# Patient Record
Sex: MALE | Race: ASIAN | NOT HISPANIC OR LATINO | Employment: FULL TIME | URBAN - METROPOLITAN AREA
[De-identification: names, ages, dates, MRNs, and addresses within clinical notes are randomized per-mention and may not be internally consistent; named-entity substitution may affect disease eponyms.]

---

## 2023-01-12 ENCOUNTER — TRANSFERRED RECORDS (OUTPATIENT)
Dept: HEALTH INFORMATION MANAGEMENT | Facility: CLINIC | Age: 58
End: 2023-01-12

## 2023-01-12 ENCOUNTER — APPOINTMENT (OUTPATIENT)
Dept: INTERPRETER SERVICES | Facility: CLINIC | Age: 58
End: 2023-01-12
Payer: COMMERCIAL

## 2023-01-12 LAB
ALBUMIN (URINE) MG/SPEC: 155.9 UG/ML
ALBUMIN/CREATININE RATIO: 82 MG/G CREAT (ref 0–29)
ALT SERPL-CCNC: 17 IU/L (ref 0–44)
AST SERPL-CCNC: 26 IU/L (ref 0–40)
CREATININE (EXTERNAL): 0.81 MG/DL (ref 0.76–1.27)
CREATININE (URINE): 189.6 MG/DL
GFR ESTIMATED (EXTERNAL): 102 ML/MIN/1.73
GLUCOSE (EXTERNAL): 99 MG/DL (ref 70–99)
HBA1C MFR BLD: 5.3 % (ref 4.8–5.6)
POTASSIUM (EXTERNAL): 4.5 MMOL/L (ref 3.5–5.2)
TSH SERPL-ACNC: 2.24 UIU/ML (ref 0.45–4.5)

## 2023-01-26 ENCOUNTER — TRANSFERRED RECORDS (OUTPATIENT)
Dept: HEALTH INFORMATION MANAGEMENT | Facility: CLINIC | Age: 58
End: 2023-01-26

## 2023-03-03 ENCOUNTER — OFFICE VISIT (OUTPATIENT)
Dept: CARDIOLOGY | Facility: CLINIC | Age: 58
End: 2023-03-03
Payer: COMMERCIAL

## 2023-03-03 VITALS
HEART RATE: 66 BPM | SYSTOLIC BLOOD PRESSURE: 118 MMHG | HEIGHT: 62 IN | WEIGHT: 204 LBS | DIASTOLIC BLOOD PRESSURE: 64 MMHG | RESPIRATION RATE: 20 BRPM | BODY MASS INDEX: 37.54 KG/M2

## 2023-03-03 DIAGNOSIS — E78.5 DYSLIPIDEMIA: Primary | ICD-10-CM

## 2023-03-03 DIAGNOSIS — I25.118 CORONARY ARTERY DISEASE INVOLVING NATIVE CORONARY ARTERY OF NATIVE HEART WITH OTHER FORM OF ANGINA PECTORIS (H): ICD-10-CM

## 2023-03-03 PROCEDURE — 93000 ELECTROCARDIOGRAM COMPLETE: CPT | Performed by: INTERNAL MEDICINE

## 2023-03-03 PROCEDURE — 99204 OFFICE O/P NEW MOD 45 MIN: CPT | Performed by: INTERNAL MEDICINE

## 2023-03-03 RX ORDER — FLUOXETINE 40 MG/1
1 CAPSULE ORAL DAILY
COMMUNITY
Start: 2023-01-12

## 2023-03-03 RX ORDER — METOPROLOL SUCCINATE 50 MG/1
1 TABLET, EXTENDED RELEASE ORAL DAILY
COMMUNITY
Start: 2023-01-12

## 2023-03-03 RX ORDER — METFORMIN HCL 500 MG
1 TABLET, EXTENDED RELEASE 24 HR ORAL DAILY
COMMUNITY
Start: 2023-01-12

## 2023-03-03 RX ORDER — TRAZODONE HYDROCHLORIDE 50 MG/1
1 TABLET, FILM COATED ORAL EVERY OTHER DAY
COMMUNITY
Start: 2023-01-12

## 2023-03-03 RX ORDER — ATORVASTATIN CALCIUM 80 MG/1
1 TABLET, FILM COATED ORAL DAILY
COMMUNITY
Start: 2023-01-12

## 2023-03-03 RX ORDER — ASPIRIN 81 MG/1
1 TABLET, COATED ORAL DAILY
COMMUNITY
Start: 2023-02-08

## 2023-03-03 RX ORDER — LISINOPRIL 20 MG/1
1 TABLET ORAL DAILY
COMMUNITY
Start: 2023-01-12

## 2023-03-03 RX ORDER — NITROGLYCERIN 0.4 MG/1
TABLET SUBLINGUAL
COMMUNITY
Start: 2022-04-05

## 2023-03-03 RX ORDER — AMLODIPINE BESYLATE 5 MG/1
1 TABLET ORAL DAILY
COMMUNITY
Start: 2023-01-12

## 2023-03-03 NOTE — PROGRESS NOTES
HEART CARE ENCOUNTER CONSULTATON NOTE      [facility]  [phone]      Assessment/Recommendations   Assessment:  1.  Coronary artery disease status post PCI 2017 with markedly abnormal EKG.  No chest pain.  Chronic dyspnea on exertion likely underlying COPD  2.  Dyslipidemia: Need fasting lipids and will check today  3.  Hypertension: Well-controlled  4.  Diabetes mellitus type 2  5.  Active smoking    Plan:  1.  Fasting lipid profile today  2.  Obtain records from [facility]  3.  Echocardiogram to evaluate for structural heart disease  4.  Continue aspirin, Lipitor, lisinopril, Norvasc and metoprolol  Follow-up in 1 year    Obtained records from [facility] after his visit.  He was admitted with an acute myocardial infarction culprit vessel LAD and on 4/1/2016 underwent PCI of LAD, ramus.  Staged PCI of RPDA was recommended and it does not appear he ever followed up with this.  LVEF 40-50% with wall motion abnormality in the anteroseptum, distal and anterior wall and apex.  Mild aortic regurgitation with mild dilation of sinuses of Valsalva at 4 cm.  Will recommend Lexiscan nuclear stress test to reassess ischemic disease.     History of Present Illness/Subjective    HPI: Elias Solorzano is a 57 year old male with history of coronary artery disease status post PCI of unknown vessel 2017 at [facility] (obtaining records not available at this time), hypertension, dyslipidemia, current smoker and diabetes mellitus who I am seeing today to establish care.  He is here today accompanied by his son.  He lives with his girlfriend and son.  He used to smoke a pack and a half a day and now smokes 5 cigarettes a day.  He has chronic shortness of breath with activity that improved after he had the stents placed.  He has not followed with a cardiologist since the hospitalization.  He walks his dog every day about 1-1/2 to 2 hours a day.  Denies any chest pain.    Twelve-lead EKG demonstrates sinus  "rhythm with first-degree AV block evidence of anteroseptal infarct age undetermined     Physical Examination  Review of Systems   Vitals: /64 (BP Location: Left arm, Patient Position: Sitting, Cuff Size: Adult Large)   Pulse 66   Resp 20   Ht 1.575 m (5' 2\")   Wt 92.5 kg (204 lb)   BMI 37.31 kg/m    BMI= Body mass index is 37.31 kg/m .  Wt Readings from Last 3 Encounters:   03/03/23 92.5 kg (204 lb)       General Appearance:   no distress, normal body habitus   ENT/Mouth: membranes moist, no oral lesions or bleeding gums.      EYES:  no scleral icterus, normal conjunctivae   Neck: no carotid bruits or thyromegaly   Chest/Lungs:   lungs are clear to auscultation   Cardiovascular:   Regular. Normal first and second heart sounds with no murmurs  no edema bilaterally    Abdomen:  no organomegaly, masses, bruits, or tenderness; bowel sounds are present   Extremities: no cyanosis or clubbing   Skin: no xanthelasma, warm.    Neurologic: normal  bilateral, no tremors     Psychiatric: alert and oriented x3, calm        Please refer above for cardiac ROS details.        Medical History  Surgical History Family History Social History   Coronary artery disease status post PCI 2017 at [facility]  Hypertension  Dyslipidemia  Beatties mellitus No past surgical history on file.    No Family history of heart disease   Social History     Socioeconomic History     Marital status:      Spouse name: Not on file     Number of children: Not on file     Years of education: Not on file     Highest education level: Not on file   Occupational History     Not on file   Tobacco Use     Smoking status: Every Day     Types: Cigarettes     Smokeless tobacco: Never   Substance and Sexual Activity     Alcohol use: Not on file     Drug use: Not on file     Sexual activity: Not on file   Other Topics Concern     Not on file   Social History Narrative     Not on file     Social Determinants of Health     Financial Resource Strain: " Not on file   Food Insecurity: Not on file   Transportation Needs: Not on file   Physical Activity: Not on file   Stress: Not on file   Social Connections: Not on file   Intimate Partner Violence: Not on file   Housing Stability: Not on file           Medications  Allergies   Current Outpatient Medications   Medication Sig Dispense Refill     amLODIPine (NORVASC) 5 MG tablet Take 1 tablet by mouth daily       ASPIRIN LOW DOSE 81 MG EC tablet Take 1 tablet by mouth daily       atorvastatin (LIPITOR) 80 MG tablet Take 1 tablet by mouth daily       FLUoxetine (PROZAC) 40 MG capsule Take 1 capsule by mouth daily       lisinopril (ZESTRIL) 20 MG tablet Take 1 tablet by mouth daily       metFORMIN (GLUCOPHAGE XR) 500 MG 24 hr tablet Take 1 tablet by mouth daily       metoprolol succinate ER (TOPROL XL) 50 MG 24 hr tablet Take 1 tablet by mouth daily       nitroGLYcerin (NITROSTAT) 0.4 MG sublingual tablet DISSOLVE 1 PILL UNDER TONGUE EVERY 5 MINUTES AS NEEDED FOR CHEST PAIN/YOG MOB HAUV SIAB MUAB IB LUB TSHUAJ LOAN HAUV QAB NPLAIG TXHUA 5 JODI THIS       traZODone (DESYREL) 50 MG tablet Take 1 tablet by mouth every other day       No Known Allergies       Lab Results    Chemistry/lipid CBC Cardiac Enzymes/BNP/TSH/INR   No results for input(s): CHOL, HDL, LDL, TRIG, CHOLHDLRATIO in the last 83593 hours.  No results for input(s): LDL in the last 66604 hours.  No results for input(s): NA, POTASSIUM, CHLORIDE, CO2, GLC, BUN, CR, GFRESTIMATED, JOEY in the last 10662 hours.    Invalid input(s): GRFESTBLACK  No results for input(s): CR in the last 56483 hours.  No results for input(s): A1C in the last 75807 hours.       No results for input(s): WBC, HGB, HCT, MCV, PLT in the last 23410 hours.  No results for input(s): HGB in the last 22839 hours. No results for input(s): TROPONINI in the last 77454 hours.  No results for input(s): BNP, NTBNPI, NTBNP in the last 68958 hours.  No results for input(s): TSH in the last 56867 hours.  No  results for input(s): INR in the last 24601 hours.     Roxi Huizar MD

## 2023-03-03 NOTE — LETTER
3/3/2023    RONIT WALDEN MD  [facility] [address]  [facility] [address]    RE: Elias Solorzano       Dear Colleague,     I had the pleasure of seeing Elias Solorzano in the [facility].    HEART CARE ENCOUNTER CONSULTATON NOTE      [facility]  [phone]      Assessment/Recommendations   Assessment:  1.  Coronary artery disease status post PCI 2017 with markedly abnormal EKG.  No chest pain.  Chronic dyspnea on exertion likely underlying COPD  2.  Dyslipidemia: Need fasting lipids and will check today  3.  Hypertension: Well-controlled  4.  Diabetes mellitus type 2  5.  Active smoking    Plan:  1.  Fasting lipid profile today  2.  Obtain records from [facility]  3.  Echocardiogram to evaluate for structural heart disease  4.  Continue aspirin, Lipitor, lisinopril, Norvasc and metoprolol  Follow-up in 1 year    Obtained records from [facility] after his visit.  He was admitted with an acute myocardial infarction culprit vessel LAD and on 4/1/2016 underwent PCI of LAD, ramus.  Staged PCI of RPDA was recommended and it does not appear he ever followed up with this.  LVEF 40-50% with wall motion abnormality in the anteroseptum, distal and anterior wall and apex.  Mild aortic regurgitation with mild dilation of sinuses of Valsalva at 4 cm.  Will recommend Lexiscan nuclear stress test to reassess ischemic disease.     History of Present Illness/Subjective    HPI: Elias Solorzano is a 57 year old male with history of coronary artery disease status post PCI of unknown vessel 2017 at [facility] (obtaining records not available at this time), hypertension, dyslipidemia, current smoker and diabetes mellitus who I am seeing today to establish care.  He is here today accompanied by his son.  He lives with his girlfriend and son.  He used to smoke a pack and a half a day and now smokes 5 cigarettes a day.  He has chronic shortness of breath with activity that improved after he had the  "stents placed.  He has not followed with a cardiologist since the hospitalization.  He walks his dog every day about 1-1/2 to 2 hours a day.  Denies any chest pain.    Twelve-lead EKG demonstrates sinus rhythm with first-degree AV block evidence of anteroseptal infarct age undetermined     Physical Examination  Review of Systems   Vitals: /64 (BP Location: Left arm, Patient Position: Sitting, Cuff Size: Adult Large)   Pulse 66   Resp 20   Ht 1.575 m (5' 2\")   Wt 92.5 kg (204 lb)   BMI 37.31 kg/m    BMI= Body mass index is 37.31 kg/m .  Wt Readings from Last 3 Encounters:   03/03/23 92.5 kg (204 lb)       General Appearance:   no distress, normal body habitus   ENT/Mouth: membranes moist, no oral lesions or bleeding gums.      EYES:  no scleral icterus, normal conjunctivae   Neck: no carotid bruits or thyromegaly   Chest/Lungs:   lungs are clear to auscultation   Cardiovascular:   Regular. Normal first and second heart sounds with no murmurs  no edema bilaterally    Abdomen:  no organomegaly, masses, bruits, or tenderness; bowel sounds are present   Extremities: no cyanosis or clubbing   Skin: no xanthelasma, warm.    Neurologic: normal  bilateral, no tremors     Psychiatric: alert and oriented x3, calm        Please refer above for cardiac ROS details.        Medical History  Surgical History Family History Social History   Coronary artery disease status post PCI 2017 at [facility]  Hypertension  Dyslipidemia  Beatties mellitus No past surgical history on file.    No Family history of heart disease   Social History     Socioeconomic History     Marital status:      Spouse name: Not on file     Number of children: Not on file     Years of education: Not on file     Highest education level: Not on file   Occupational History     Not on file   Tobacco Use     Smoking status: Every Day     Types: Cigarettes     Smokeless tobacco: Never   Substance and Sexual Activity     Alcohol use: Not on file     " Drug use: Not on file     Sexual activity: Not on file   Other Topics Concern     Not on file   Social History Narrative     Not on file     Social Determinants of Health     Financial Resource Strain: Not on file   Food Insecurity: Not on file   Transportation Needs: Not on file   Physical Activity: Not on file   Stress: Not on file   Social Connections: Not on file   Intimate Partner Violence: Not on file   Housing Stability: Not on file           Medications  Allergies   Current Outpatient Medications   Medication Sig Dispense Refill     amLODIPine (NORVASC) 5 MG tablet Take 1 tablet by mouth daily       ASPIRIN LOW DOSE 81 MG EC tablet Take 1 tablet by mouth daily       atorvastatin (LIPITOR) 80 MG tablet Take 1 tablet by mouth daily       FLUoxetine (PROZAC) 40 MG capsule Take 1 capsule by mouth daily       lisinopril (ZESTRIL) 20 MG tablet Take 1 tablet by mouth daily       metFORMIN (GLUCOPHAGE XR) 500 MG 24 hr tablet Take 1 tablet by mouth daily       metoprolol succinate ER (TOPROL XL) 50 MG 24 hr tablet Take 1 tablet by mouth daily       nitroGLYcerin (NITROSTAT) 0.4 MG sublingual tablet DISSOLVE 1 PILL UNDER TONGUE EVERY 5 MINUTES AS NEEDED FOR CHEST PAIN/YOG MOB HAUV SIAB MUAB IB LUB TSHUAJ LOAN HAUV QAB NPLAIG TXHUA 5 JODI THIS       traZODone (DESYREL) 50 MG tablet Take 1 tablet by mouth every other day       No Known Allergies       Lab Results    Chemistry/lipid CBC Cardiac Enzymes/BNP/TSH/INR   No results for input(s): CHOL, HDL, LDL, TRIG, CHOLHDLRATIO in the last 63512 hours.  No results for input(s): LDL in the last 63076 hours.  No results for input(s): NA, POTASSIUM, CHLORIDE, CO2, GLC, BUN, CR, GFRESTIMATED, JOEY in the last 32849 hours.    Invalid input(s): GRFESTBLACK  No results for input(s): CR in the last 98714 hours.  No results for input(s): A1C in the last 93591 hours.       No results for input(s): WBC, HGB, HCT, MCV, PLT in the last 01171 hours.  No results for input(s): HGB in the last  32915 hours. No results for input(s): TROPONINI in the last 28339 hours.  No results for input(s): BNP, NTBNPI, NTBNP in the last 60551 hours.  No results for input(s): TSH in the last 40303 hours.  No results for input(s): INR in the last 32155 hours.     Roxi Huizar MD      Thank you for allowing me to participate in the care of your patient.      Sincerely,     Roxi Huizar MD     [facility]  cc:   No referring provider defined for this encounter.

## 2023-03-04 LAB
ATRIAL RATE - MUSE: 60 BPM
DIASTOLIC BLOOD PRESSURE - MUSE: NORMAL MMHG
INTERPRETATION ECG - MUSE: NORMAL
P AXIS - MUSE: 25 DEGREES
PR INTERVAL - MUSE: 220 MS
QRS DURATION - MUSE: 92 MS
QT - MUSE: 436 MS
QTC - MUSE: 436 MS
R AXIS - MUSE: 11 DEGREES
SYSTOLIC BLOOD PRESSURE - MUSE: NORMAL MMHG
T AXIS - MUSE: 175 DEGREES
VENTRICULAR RATE- MUSE: 60 BPM

## 2023-03-06 ENCOUNTER — TELEPHONE (OUTPATIENT)
Dept: CARDIOLOGY | Facility: CLINIC | Age: 58
End: 2023-03-06

## 2023-03-06 NOTE — TELEPHONE ENCOUNTER
----- Message from Roxi Huizar MD sent at 3/3/2023  5:08 PM CST -----  [facility].  He has residual disease and he never returned for staged PCI.  Given this recommend Lexiscan nuclear stress test for further evaluation.  I placed the order.

## 2023-03-06 NOTE — TELEPHONE ENCOUNTER
Msg sent to sched for follow-up - noted echo sched on 3-31-23 - FLP pending completion - follow-up pending in 1yr.  mg

## 2023-03-30 ENCOUNTER — APPOINTMENT (OUTPATIENT)
Dept: INTERPRETER SERVICES | Facility: CLINIC | Age: 58
End: 2023-03-30
Payer: COMMERCIAL

## 2023-03-31 ENCOUNTER — HOSPITAL ENCOUNTER (OUTPATIENT)
Dept: CARDIOLOGY | Facility: HOSPITAL | Age: 58
Discharge: HOME | End: 2023-03-31
Attending: INTERNAL MEDICINE | Admitting: INTERNAL MEDICINE
Payer: COMMERCIAL

## 2023-03-31 DIAGNOSIS — I25.118 CORONARY ARTERY DISEASE INVOLVING NATIVE CORONARY ARTERY OF NATIVE HEART WITH OTHER FORM OF ANGINA PECTORIS (H): ICD-10-CM

## 2023-03-31 PROCEDURE — 255N000002 HC RX 255 OP 636: Performed by: INTERNAL MEDICINE

## 2023-03-31 PROCEDURE — 93306 TTE W/DOPPLER COMPLETE: CPT | Mod: 26 | Performed by: INTERNAL MEDICINE

## 2023-03-31 RX ADMIN — PERFLUTREN 2 ML: 6.52 INJECTION, SUSPENSION INTRAVENOUS at 09:23

## 2023-03-31 NOTE — TELEPHONE ENCOUNTER
Noted Lexiscan nuc still pending scheduling - msg sent to sched with request to make 2nd attempt to arrange testing.  mg

## 2023-04-05 ENCOUNTER — APPOINTMENT (OUTPATIENT)
Dept: INTERPRETER SERVICES | Facility: CLINIC | Age: 58
End: 2023-04-05
Payer: COMMERCIAL

## 2023-04-07 ENCOUNTER — HOSPITAL ENCOUNTER (OUTPATIENT)
Dept: NUCLEAR MEDICINE | Facility: HOSPITAL | Age: 58
Discharge: HOME | End: 2023-04-07
Attending: INTERNAL MEDICINE
Payer: COMMERCIAL

## 2023-04-07 ENCOUNTER — HOSPITAL ENCOUNTER (OUTPATIENT)
Dept: CARDIOLOGY | Facility: HOSPITAL | Age: 58
Discharge: HOME | End: 2023-04-07
Attending: INTERNAL MEDICINE
Payer: COMMERCIAL

## 2023-04-07 DIAGNOSIS — I25.118 CORONARY ARTERY DISEASE INVOLVING NATIVE CORONARY ARTERY OF NATIVE HEART WITH OTHER FORM OF ANGINA PECTORIS (H): ICD-10-CM

## 2023-04-07 LAB
CV STRESS CURRENT BP HE: NORMAL
CV STRESS CURRENT HR HE: 101
CV STRESS CURRENT HR HE: 101
CV STRESS CURRENT HR HE: 103
CV STRESS CURRENT HR HE: 103
CV STRESS CURRENT HR HE: 107
CV STRESS CURRENT HR HE: 108
CV STRESS CURRENT HR HE: 113
CV STRESS CURRENT HR HE: 113
CV STRESS CURRENT HR HE: 75
CV STRESS CURRENT HR HE: 79
CV STRESS CURRENT HR HE: 81
CV STRESS CURRENT HR HE: 96
CV STRESS CURRENT HR HE: 96
CV STRESS CURRENT HR HE: 97
CV STRESS CURRENT HR HE: 98
CV STRESS CURRENT HR HE: 99
CV STRESS DEVIATION TIME HE: NORMAL
CV STRESS ECHO PERCENT HR HE: NORMAL
CV STRESS EXERCISE STAGE HE: NORMAL
CV STRESS FINAL RESTING BP HE: NORMAL
CV STRESS FINAL RESTING HR HE: 96
CV STRESS MAX HR HE: 115
CV STRESS MAX TREADMILL GRADE HE: 0
CV STRESS MAX TREADMILL SPEED HE: 0
CV STRESS PEAK DIA BP HE: NORMAL
CV STRESS PEAK SYS BP HE: NORMAL
CV STRESS PHASE HE: NORMAL
CV STRESS PROTOCOL HE: NORMAL
CV STRESS RESTING PT POSITION HE: NORMAL
CV STRESS ST DEVIATION AMOUNT HE: NORMAL
CV STRESS ST DEVIATION ELEVATION HE: NORMAL
CV STRESS ST EVELATION AMOUNT HE: NORMAL
CV STRESS TEST TYPE HE: NORMAL
CV STRESS TOTAL STAGE TIME MIN 1 HE: NORMAL
NUC STRESS EJECTION FRACTION: 33 %
RATE PRESSURE PRODUCT: NORMAL
STRESS ECHO BASELINE DIASTOLIC HE: 89
STRESS ECHO BASELINE HR: 76
STRESS ECHO BASELINE SYSTOLIC BP: 150
STRESS ECHO CALCULATED PERCENT HR: 71 %
STRESS ECHO LAST STRESS DIASTOLIC BP: 86
STRESS ECHO LAST STRESS HR: 103
STRESS ECHO LAST STRESS SYSTOLIC BP: 152
STRESS ECHO TARGET HR: 162

## 2023-04-07 PROCEDURE — 343N000001 HC RX 343: Performed by: INTERNAL MEDICINE

## 2023-04-07 PROCEDURE — 93018 CV STRESS TEST I&R ONLY: CPT | Performed by: INTERNAL MEDICINE

## 2023-04-07 PROCEDURE — 250N000011 HC RX IP 250 OP 636: Performed by: INTERNAL MEDICINE

## 2023-04-07 PROCEDURE — A9500 TC99M SESTAMIBI: HCPCS | Performed by: INTERNAL MEDICINE

## 2023-04-07 PROCEDURE — 78452 HT MUSCLE IMAGE SPECT MULT: CPT

## 2023-04-07 PROCEDURE — 93017 CV STRESS TEST TRACING ONLY: CPT

## 2023-04-07 PROCEDURE — 78452 HT MUSCLE IMAGE SPECT MULT: CPT | Mod: 26 | Performed by: INTERNAL MEDICINE

## 2023-04-07 PROCEDURE — 93016 CV STRESS TEST SUPVJ ONLY: CPT | Performed by: INTERNAL MEDICINE

## 2023-04-07 RX ORDER — REGADENOSON 0.08 MG/ML
0.4 INJECTION, SOLUTION INTRAVENOUS ONCE
Status: COMPLETED | OUTPATIENT
Start: 2023-04-07 | End: 2023-04-07

## 2023-04-07 RX ORDER — AMINOPHYLLINE 25 MG/ML
50 INJECTION, SOLUTION INTRAVENOUS
Status: DISCONTINUED | OUTPATIENT
Start: 2023-04-07 | End: 2023-04-07 | Stop reason: HOSPADM

## 2023-04-07 RX ADMIN — Medication 28.8 MILLICURIE: at 10:20

## 2023-04-07 RX ADMIN — REGADENOSON 0.4 MG: 0.08 INJECTION, SOLUTION INTRAVENOUS at 10:18

## 2023-04-07 RX ADMIN — Medication 8.2 MILLICURIE: at 09:07

## 2024-01-01 ENCOUNTER — OFFICE VISIT (OUTPATIENT)
Dept: PULMONOLOGY | Facility: CLINIC | Age: 59
End: 2024-01-01
Payer: COMMERCIAL

## 2024-01-01 ENCOUNTER — APPOINTMENT (OUTPATIENT)
Dept: GENERAL RADIOLOGY | Facility: CLINIC | Age: 59
End: 2024-01-01
Attending: NURSE PRACTITIONER
Payer: COMMERCIAL

## 2024-01-01 ENCOUNTER — TRANSFERRED RECORDS (OUTPATIENT)
Dept: HEALTH INFORMATION MANAGEMENT | Facility: CLINIC | Age: 59
End: 2024-01-01
Payer: COMMERCIAL

## 2024-01-01 ENCOUNTER — APPOINTMENT (OUTPATIENT)
Dept: INTERPRETER SERVICES | Facility: CLINIC | Age: 59
End: 2024-01-01
Payer: COMMERCIAL

## 2024-01-01 ENCOUNTER — APPOINTMENT (OUTPATIENT)
Dept: CT IMAGING | Facility: CLINIC | Age: 59
End: 2024-01-01
Attending: INTERNAL MEDICINE
Payer: COMMERCIAL

## 2024-01-01 ENCOUNTER — APPOINTMENT (OUTPATIENT)
Dept: NEUROLOGY | Facility: CLINIC | Age: 59
End: 2024-01-01
Attending: NURSE PRACTITIONER
Payer: COMMERCIAL

## 2024-01-01 ENCOUNTER — APPOINTMENT (OUTPATIENT)
Dept: ULTRASOUND IMAGING | Facility: CLINIC | Age: 59
End: 2024-01-01
Attending: NURSE PRACTITIONER
Payer: COMMERCIAL

## 2024-01-01 ENCOUNTER — TELEPHONE (OUTPATIENT)
Dept: PULMONOLOGY | Facility: CLINIC | Age: 59
End: 2024-01-01
Payer: COMMERCIAL

## 2024-01-01 ENCOUNTER — MEDICAL CORRESPONDENCE (OUTPATIENT)
Dept: HEALTH INFORMATION MANAGEMENT | Facility: CLINIC | Age: 59
End: 2024-01-01
Payer: COMMERCIAL

## 2024-01-01 ENCOUNTER — APPOINTMENT (OUTPATIENT)
Dept: NUCLEAR MEDICINE | Facility: CLINIC | Age: 59
End: 2024-01-01
Attending: NURSE PRACTITIONER
Payer: COMMERCIAL

## 2024-01-01 ENCOUNTER — HOSPITAL ENCOUNTER (INPATIENT)
Facility: CLINIC | Age: 59
LOS: 2 days | End: 2024-08-01
Admitting: INTERNAL MEDICINE
Payer: COMMERCIAL

## 2024-01-01 ENCOUNTER — APPOINTMENT (OUTPATIENT)
Dept: CT IMAGING | Facility: CLINIC | Age: 59
End: 2024-01-01
Attending: NURSE PRACTITIONER
Payer: COMMERCIAL

## 2024-01-01 ENCOUNTER — APPOINTMENT (OUTPATIENT)
Dept: CARDIOLOGY | Facility: CLINIC | Age: 59
End: 2024-01-01
Attending: NURSE PRACTITIONER
Payer: COMMERCIAL

## 2024-01-01 VITALS
HEART RATE: 58 BPM | WEIGHT: 203 LBS | HEIGHT: 62 IN | OXYGEN SATURATION: 99 % | SYSTOLIC BLOOD PRESSURE: 112 MMHG | DIASTOLIC BLOOD PRESSURE: 68 MMHG | BODY MASS INDEX: 37.36 KG/M2

## 2024-01-01 DIAGNOSIS — R91.8 LEFT LOWER LOBE PULMONARY INFILTRATE: Primary | ICD-10-CM

## 2024-01-01 DIAGNOSIS — I46.9 CARDIAC ARREST (H): ICD-10-CM

## 2024-01-01 LAB
ABO/RH(D): NORMAL
ACT BLD: 165 SECONDS (ref 74–150)
ACT BLD: 173 SECONDS (ref 74–150)
ACT BLD: 174 SECONDS (ref 74–150)
ACT BLD: 174 SECONDS (ref 74–150)
ACT BLD: 178 SECONDS (ref 74–150)
ACT BLD: 178 SECONDS (ref 74–150)
ACT BLD: 182 SECONDS (ref 74–150)
ACT BLD: 182 SECONDS (ref 74–150)
ACT BLD: 186 SECONDS (ref 74–150)
ACT BLD: 190 SECONDS (ref 74–150)
ACT BLD: 195 SECONDS (ref 74–150)
ACT BLD: 195 SECONDS (ref 74–150)
ACT BLD: 199 SECONDS (ref 74–150)
ACT BLD: 199 SECONDS (ref 74–150)
ACT BLD: 203 SECONDS (ref 74–150)
ACT BLD: 207 SECONDS (ref 74–150)
ACT BLD: 220 SECONDS (ref 74–150)
ACT BLD: 220 SECONDS (ref 74–150)
ACT BLD: 236 SECONDS (ref 74–150)
ACT BLD: 241 SECONDS (ref 74–150)
ALBUMIN SERPL BCG-MCNC: 2.7 G/DL (ref 3.5–5.2)
ALBUMIN SERPL BCG-MCNC: 2.8 G/DL (ref 3.5–5.2)
ALBUMIN SERPL BCG-MCNC: 2.9 G/DL (ref 3.5–5.2)
ALBUMIN SERPL BCG-MCNC: 3 G/DL (ref 3.5–5.2)
ALBUMIN SERPL BCG-MCNC: 3.1 G/DL (ref 3.5–5.2)
ALBUMIN SERPL BCG-MCNC: 3.4 G/DL (ref 3.5–5.2)
ALBUMIN UR-MCNC: 200 MG/DL
ALLEN'S TEST: ABNORMAL
ALP SERPL-CCNC: 36 U/L (ref 40–150)
ALP SERPL-CCNC: 37 U/L (ref 40–150)
ALP SERPL-CCNC: 40 U/L (ref 40–150)
ALP SERPL-CCNC: 41 U/L (ref 40–150)
ALP SERPL-CCNC: 46 U/L (ref 40–150)
ALP SERPL-CCNC: 46 U/L (ref 40–150)
ALP SERPL-CCNC: 50 U/L (ref 40–150)
ALP SERPL-CCNC: 55 U/L (ref 40–150)
ALP SERPL-CCNC: 60 U/L (ref 40–150)
ALP SERPL-CCNC: 73 U/L (ref 40–150)
ALP SERPL-CCNC: 94 U/L (ref 40–150)
ALT SERPL W P-5'-P-CCNC: 153 U/L (ref 0–70)
ALT SERPL W P-5'-P-CCNC: 157 U/L (ref 0–70)
ALT SERPL W P-5'-P-CCNC: 164 U/L (ref 0–70)
ALT SERPL W P-5'-P-CCNC: 168 U/L (ref 0–70)
ALT SERPL W P-5'-P-CCNC: 179 U/L (ref 0–70)
ALT SERPL W P-5'-P-CCNC: 189 U/L (ref 0–70)
ALT SERPL W P-5'-P-CCNC: 206 U/L (ref 0–70)
ALT SERPL W P-5'-P-CCNC: 265 U/L (ref 0–70)
ALT SERPL W P-5'-P-CCNC: 281 U/L (ref 0–70)
ALT SERPL W P-5'-P-CCNC: 319 U/L (ref 0–70)
ALT SERPL W P-5'-P-CCNC: 341 U/L (ref 0–70)
AMMONIA PLAS-SCNC: 30 UMOL/L (ref 16–60)
AMPHETAMINES UR QL SCN: ABNORMAL
ANION GAP SERPL CALCULATED.3IONS-SCNC: 11 MMOL/L (ref 7–15)
ANION GAP SERPL CALCULATED.3IONS-SCNC: 12 MMOL/L (ref 7–15)
ANION GAP SERPL CALCULATED.3IONS-SCNC: 13 MMOL/L (ref 7–15)
ANION GAP SERPL CALCULATED.3IONS-SCNC: 14 MMOL/L (ref 7–15)
ANION GAP SERPL CALCULATED.3IONS-SCNC: 15 MMOL/L (ref 7–15)
ANION GAP SERPL CALCULATED.3IONS-SCNC: 18 MMOL/L (ref 7–15)
ANION GAP SERPL CALCULATED.3IONS-SCNC: 24 MMOL/L (ref 7–15)
ANTIBODY SCREEN: NEGATIVE
APPEARANCE UR: ABNORMAL
APTT PPP: 101 SECONDS (ref 22–38)
APTT PPP: 61 SECONDS (ref 22–38)
APTT PPP: 63 SECONDS (ref 22–38)
APTT PPP: 65 SECONDS (ref 22–38)
APTT PPP: 65 SECONDS (ref 22–38)
APTT PPP: 69 SECONDS (ref 22–38)
APTT PPP: 81 SECONDS (ref 22–38)
APTT PPP: 89 SECONDS (ref 22–38)
APTT PPP: 92 SECONDS (ref 22–38)
APTT PPP: 95 SECONDS (ref 22–38)
AST SERPL W P-5'-P-CCNC: 1100 U/L (ref 0–45)
AST SERPL W P-5'-P-CCNC: 1527 U/L (ref 0–45)
AST SERPL W P-5'-P-CCNC: 1648 U/L (ref 0–45)
AST SERPL W P-5'-P-CCNC: 2148 U/L (ref 0–45)
AST SERPL W P-5'-P-CCNC: 2464 U/L (ref 0–45)
AST SERPL W P-5'-P-CCNC: 620 U/L (ref 0–45)
AST SERPL W P-5'-P-CCNC: 649 U/L (ref 0–45)
AST SERPL W P-5'-P-CCNC: 712 U/L (ref 0–45)
AST SERPL W P-5'-P-CCNC: 737 U/L (ref 0–45)
AST SERPL W P-5'-P-CCNC: 795 U/L (ref 0–45)
AST SERPL W P-5'-P-CCNC: 903 U/L (ref 0–45)
AT III ACT/NOR PPP CHRO: 40 % (ref 85–135)
AT III ACT/NOR PPP CHRO: 53 % (ref 85–135)
ATRIAL RATE - MUSE: 103 BPM
ATRIAL RATE - MUSE: 60 BPM
ATRIAL RATE - MUSE: 62 BPM
BACTERIA SPT CULT: NORMAL
BARBITURATES UR QL SCN: ABNORMAL
BASE EXCESS BLDA CALC-SCNC: -1.3 MMOL/L (ref -3–3)
BASE EXCESS BLDA CALC-SCNC: -1.4 MMOL/L (ref -3–3)
BASE EXCESS BLDA CALC-SCNC: -1.4 MMOL/L (ref -3–3)
BASE EXCESS BLDA CALC-SCNC: -1.9 MMOL/L (ref -3–3)
BASE EXCESS BLDA CALC-SCNC: -1.9 MMOL/L (ref -3–3)
BASE EXCESS BLDA CALC-SCNC: -2 MMOL/L (ref -3–3)
BASE EXCESS BLDA CALC-SCNC: -2.2 MMOL/L (ref -3–3)
BASE EXCESS BLDA CALC-SCNC: -2.2 MMOL/L (ref -3–3)
BASE EXCESS BLDA CALC-SCNC: -2.7 MMOL/L (ref -3–3)
BASE EXCESS BLDA CALC-SCNC: -2.7 MMOL/L (ref -3–3)
BASE EXCESS BLDA CALC-SCNC: -3 MMOL/L (ref -3–3)
BASE EXCESS BLDA CALC-SCNC: -3 MMOL/L (ref -3–3)
BASE EXCESS BLDA CALC-SCNC: -3.1 MMOL/L (ref -3–3)
BASE EXCESS BLDA CALC-SCNC: -3.2 MMOL/L (ref -3–3)
BASE EXCESS BLDA CALC-SCNC: -3.3 MMOL/L (ref -3–3)
BASE EXCESS BLDA CALC-SCNC: -3.3 MMOL/L (ref -3–3)
BASE EXCESS BLDA CALC-SCNC: -3.5 MMOL/L (ref -3–3)
BASE EXCESS BLDA CALC-SCNC: -3.6 MMOL/L (ref -3–3)
BASE EXCESS BLDA CALC-SCNC: -3.6 MMOL/L (ref -3–3)
BASE EXCESS BLDA CALC-SCNC: -3.7 MMOL/L (ref -3–3)
BASE EXCESS BLDA CALC-SCNC: -3.7 MMOL/L (ref -3–3)
BASE EXCESS BLDA CALC-SCNC: -3.8 MMOL/L (ref -3–3)
BASE EXCESS BLDA CALC-SCNC: -4 MMOL/L (ref -3–3)
BASE EXCESS BLDA CALC-SCNC: -4.5 MMOL/L (ref -3–3)
BASE EXCESS BLDA CALC-SCNC: -4.6 MMOL/L (ref -3–3)
BASE EXCESS BLDA CALC-SCNC: -6 MMOL/L (ref -3–3)
BASE EXCESS BLDA CALC-SCNC: -6.1 MMOL/L (ref -3–3)
BASE EXCESS BLDA CALC-SCNC: -6.1 MMOL/L (ref -3–3)
BASE EXCESS BLDA CALC-SCNC: -6.4 MMOL/L (ref -3–3)
BASE EXCESS BLDA CALC-SCNC: -6.4 MMOL/L (ref -3–3)
BASE EXCESS BLDA CALC-SCNC: -6.6 MMOL/L (ref -3–3)
BASE EXCESS BLDA CALC-SCNC: -7.2 MMOL/L (ref -3–3)
BASE EXCESS BLDA CALC-SCNC: -7.2 MMOL/L (ref -3–3)
BASE EXCESS BLDA CALC-SCNC: -7.5 MMOL/L (ref -3–3)
BASE EXCESS BLDA CALC-SCNC: -7.8 MMOL/L (ref -3–3)
BASE EXCESS BLDA CALC-SCNC: -8.5 MMOL/L (ref -3–3)
BASE EXCESS BLDA CALC-SCNC: -8.6 MMOL/L (ref -3–3)
BASE EXCESS BLDV CALC-SCNC: -1.4 MMOL/L (ref -3–3)
BASE EXCESS BLDV CALC-SCNC: -2.1 MMOL/L (ref -3–3)
BASE EXCESS BLDV CALC-SCNC: -2.8 MMOL/L (ref -3–3)
BASE EXCESS BLDV CALC-SCNC: -3.6 MMOL/L (ref -3–3)
BASE EXCESS BLDV CALC-SCNC: -4.8 MMOL/L (ref -3–3)
BASE EXCESS BLDV CALC-SCNC: -5.7 MMOL/L (ref -3–3)
BASE EXCESS BLDV CALC-SCNC: -6.8 MMOL/L (ref -3–3)
BASOPHILS # BLD AUTO: 0 10E3/UL (ref 0–0.2)
BASOPHILS NFR BLD AUTO: 0 %
BENZODIAZ UR QL SCN: ABNORMAL
BILIRUB SERPL-MCNC: 0.6 MG/DL
BILIRUB SERPL-MCNC: 0.7 MG/DL
BILIRUB SERPL-MCNC: 0.8 MG/DL
BILIRUB SERPL-MCNC: 1.3 MG/DL
BILIRUB SERPL-MCNC: 1.4 MG/DL
BILIRUB SERPL-MCNC: 1.4 MG/DL
BILIRUB SERPL-MCNC: 1.5 MG/DL
BILIRUB SERPL-MCNC: 1.5 MG/DL
BILIRUB SERPL-MCNC: 2 MG/DL
BILIRUB SERPL-MCNC: 2.1 MG/DL
BILIRUB SERPL-MCNC: 2.2 MG/DL
BILIRUB UR QL STRIP: NEGATIVE
BLD PROD TYP BPU: NORMAL
BLD PROD TYP BPU: NORMAL
BLOOD COMPONENT TYPE: NORMAL
BLOOD COMPONENT TYPE: NORMAL
BUN SERPL-MCNC: 15.9 MG/DL (ref 8–23)
BUN SERPL-MCNC: 20 MG/DL (ref 8–23)
BUN SERPL-MCNC: 23.7 MG/DL (ref 8–23)
BUN SERPL-MCNC: 26.5 MG/DL (ref 8–23)
BUN SERPL-MCNC: 28.5 MG/DL (ref 8–23)
BUN SERPL-MCNC: 30.9 MG/DL (ref 8–23)
BUN SERPL-MCNC: 33.5 MG/DL (ref 8–23)
BUN SERPL-MCNC: 36.8 MG/DL (ref 8–23)
BUN SERPL-MCNC: 39.7 MG/DL (ref 8–23)
BUN SERPL-MCNC: 42.3 MG/DL (ref 8–23)
BUN SERPL-MCNC: 45 MG/DL (ref 8–23)
BZE UR QL SCN: ABNORMAL
CA-I BLD-MCNC: 4.3 MG/DL (ref 4.4–5.2)
CA-I BLD-MCNC: 4.4 MG/DL (ref 4.4–5.2)
CA-I BLD-MCNC: 4.4 MG/DL (ref 4.4–5.2)
CA-I BLD-MCNC: 4.6 MG/DL (ref 4.4–5.2)
CA-I BLD-MCNC: 4.7 MG/DL (ref 4.4–5.2)
CA-I BLD-MCNC: 4.7 MG/DL (ref 4.4–5.2)
CA-I BLD-MCNC: 4.8 MG/DL (ref 4.4–5.2)
CA-I BLD-MCNC: 4.8 MG/DL (ref 4.4–5.2)
CA-I BLD-MCNC: 4.9 MG/DL (ref 4.4–5.2)
CA-I BLD-MCNC: 5 MG/DL (ref 4.4–5.2)
CA-I BLD-MCNC: 5 MG/DL (ref 4.4–5.2)
CALCIUM SERPL-MCNC: 7.9 MG/DL (ref 8.8–10.4)
CALCIUM SERPL-MCNC: 8.2 MG/DL (ref 8.8–10.4)
CALCIUM SERPL-MCNC: 8.3 MG/DL (ref 8.8–10.4)
CALCIUM SERPL-MCNC: 8.4 MG/DL (ref 8.8–10.4)
CALCIUM SERPL-MCNC: 8.4 MG/DL (ref 8.8–10.4)
CALCIUM SERPL-MCNC: 8.5 MG/DL (ref 8.8–10.4)
CALCIUM SERPL-MCNC: 8.7 MG/DL (ref 8.8–10.4)
CALCIUM SERPL-MCNC: 8.9 MG/DL (ref 8.8–10.4)
CALCIUM SERPL-MCNC: 9.2 MG/DL (ref 8.8–10.4)
CANNABINOIDS UR QL SCN: ABNORMAL
CF REDUC 30M P MA P HEP LENFR BLD TEG: 0.2 % (ref 0–8)
CF REDUC 30M P MA P HEP LENFR BLD TEG: 1 % (ref 0–8)
CF REDUC 30M P MA P HEP LENFR BLD TEG: 2.1 % (ref 0–8)
CF REDUC 60M P MA P HEPASE LENFR BLD TEG: 2.7 % (ref 0–15)
CF REDUC 60M P MA P HEPASE LENFR BLD TEG: 4.5 % (ref 0–15)
CF REDUC 60M P MA P HEPASE LENFR BLD TEG: 4.5 % (ref 0–15)
CFT P HPASE BLD TEG: 1.3 MINUTE (ref 1–3)
CFT P HPASE BLD TEG: 2.3 MINUTE (ref 1–3)
CFT P HPASE BLD TEG: 3.3 MINUTE (ref 1–3)
CHLORIDE SERPL-SCNC: 106 MMOL/L (ref 98–107)
CHLORIDE SERPL-SCNC: 113 MMOL/L (ref 98–107)
CHLORIDE SERPL-SCNC: 114 MMOL/L (ref 98–107)
CHLORIDE SERPL-SCNC: 115 MMOL/L (ref 98–107)
CHLORIDE SERPL-SCNC: 116 MMOL/L (ref 98–107)
CI (COAGULATION INDEX)(Z): -2.2 (ref -3–3)
CI (COAGULATION INDEX)(Z): -4.8 (ref -3–3)
CI (COAGULATION INDEX)(Z): 0.1 (ref -3–3)
CK SERPL-CCNC: 4401 U/L (ref 39–308)
CK SERPL-CCNC: 5242 U/L (ref 39–308)
CK SERPL-CCNC: 5323 U/L (ref 39–308)
CK SERPL-CCNC: 5324 U/L (ref 39–308)
CK SERPL-CCNC: 5333 U/L (ref 39–308)
CK SERPL-CCNC: 5452 U/L (ref 39–308)
CK SERPL-CCNC: ABNORMAL U/L (ref 39–308)
CLOT ANGLE P HPASE BLD TEG: 49 DEGREES (ref 53–72)
CLOT ANGLE P HPASE BLD TEG: 60 DEGREES (ref 53–72)
CLOT ANGLE P HPASE BLD TEG: 68.8 DEGREES (ref 53–72)
CLOT INIT P HPASE BLD TEG: 6.4 MINUTE (ref 5–10)
CLOT INIT P HPASE BLD TEG: 7.2 MINUTE (ref 5–10)
CLOT INIT P HPASE BLD TEG: 8.7 MINUTE (ref 5–10)
CLOT STRENGTH P HPASE BLD TEG: 5 KD/SC (ref 4.5–11)
CLOT STRENGTH P HPASE BLD TEG: 5.2 KD/SC (ref 4.5–11)
CLOT STRENGTH P HPASE BLD TEG: 8.9 KD/SC (ref 4.5–11)
CODING SYSTEM: NORMAL
CODING SYSTEM: NORMAL
COHGB MFR BLD: 100 % (ref 96–97)
COHGB MFR BLD: 87.8 % (ref 96–97)
COHGB MFR BLD: 89.5 % (ref 96–97)
COHGB MFR BLD: 90.6 % (ref 96–97)
COHGB MFR BLD: 91.6 % (ref 96–97)
COHGB MFR BLD: 92.4 % (ref 96–97)
COHGB MFR BLD: 93.1 % (ref 96–97)
COHGB MFR BLD: 94.2 % (ref 96–97)
COHGB MFR BLD: 94.7 % (ref 96–97)
COHGB MFR BLD: 94.8 % (ref 96–97)
COHGB MFR BLD: 96.4 % (ref 96–97)
COHGB MFR BLD: 97 % (ref 96–97)
COHGB MFR BLD: 97.3 % (ref 96–97)
COHGB MFR BLD: 97.3 % (ref 96–97)
COHGB MFR BLD: 98 % (ref 96–97)
COHGB MFR BLD: 98.4 % (ref 96–97)
COHGB MFR BLD: 98.6 % (ref 96–97)
COHGB MFR BLD: 99.2 % (ref 96–97)
COHGB MFR BLD: 99.4 % (ref 96–97)
COHGB MFR BLD: 99.4 % (ref 96–97)
COHGB MFR BLD: 99.7 % (ref 96–97)
COHGB MFR BLD: 99.7 % (ref 96–97)
COHGB MFR BLD: 99.9 % (ref 96–97)
COHGB MFR BLD: 99.9 % (ref 96–97)
COHGB MFR BLD: >100 % (ref 96–97)
COLOR UR AUTO: ABNORMAL
CORTIS SERPL-MCNC: 5.1 UG/DL
CREAT SERPL-MCNC: 1.53 MG/DL (ref 0.67–1.17)
CREAT SERPL-MCNC: 2.11 MG/DL (ref 0.67–1.17)
CREAT SERPL-MCNC: 2.64 MG/DL (ref 0.67–1.17)
CREAT SERPL-MCNC: 3.01 MG/DL (ref 0.67–1.17)
CREAT SERPL-MCNC: 3.14 MG/DL (ref 0.67–1.17)
CREAT SERPL-MCNC: 3.3 MG/DL (ref 0.67–1.17)
CREAT SERPL-MCNC: 3.48 MG/DL (ref 0.67–1.17)
CREAT SERPL-MCNC: 3.67 MG/DL (ref 0.67–1.17)
CREAT SERPL-MCNC: 3.72 MG/DL (ref 0.67–1.17)
CREAT SERPL-MCNC: 3.8 MG/DL (ref 0.67–1.17)
CREAT SERPL-MCNC: 3.93 MG/DL (ref 0.67–1.17)
CRP SERPL-MCNC: 274 MG/L
CRP SERPL-MCNC: 79.4 MG/L
CRP SERPL-MCNC: <3 MG/L
D DIMER PPP FEU-MCNC: 12.08 UG/ML FEU (ref 0–0.5)
D DIMER PPP FEU-MCNC: 3.05 UG/ML FEU (ref 0–0.5)
D DIMER PPP FEU-MCNC: 3.31 UG/ML FEU (ref 0–0.5)
D DIMER PPP FEU-MCNC: 3.42 UG/ML FEU (ref 0–0.5)
D DIMER PPP FEU-MCNC: 5.93 UG/ML FEU (ref 0–0.5)
D DIMER PPP FEU-MCNC: >20 UG/ML FEU (ref 0–0.5)
DIASTOLIC BLOOD PRESSURE - MUSE: NORMAL MMHG
EGFRCR SERPLBLD CKD-EPI 2021: 17 ML/MIN/1.73M2
EGFRCR SERPLBLD CKD-EPI 2021: 17 ML/MIN/1.73M2
EGFRCR SERPLBLD CKD-EPI 2021: 18 ML/MIN/1.73M2
EGFRCR SERPLBLD CKD-EPI 2021: 18 ML/MIN/1.73M2
EGFRCR SERPLBLD CKD-EPI 2021: 19 ML/MIN/1.73M2
EGFRCR SERPLBLD CKD-EPI 2021: 21 ML/MIN/1.73M2
EGFRCR SERPLBLD CKD-EPI 2021: 22 ML/MIN/1.73M2
EGFRCR SERPLBLD CKD-EPI 2021: 23 ML/MIN/1.73M2
EGFRCR SERPLBLD CKD-EPI 2021: 27 ML/MIN/1.73M2
EGFRCR SERPLBLD CKD-EPI 2021: 35 ML/MIN/1.73M2
EGFRCR SERPLBLD CKD-EPI 2021: 52 ML/MIN/1.73M2
EOSINOPHIL # BLD AUTO: 0 10E3/UL (ref 0–0.7)
EOSINOPHIL NFR BLD AUTO: 0 %
ERYTHROCYTE [DISTWIDTH] IN BLOOD BY AUTOMATED COUNT: 13.5 % (ref 10–15)
ERYTHROCYTE [DISTWIDTH] IN BLOOD BY AUTOMATED COUNT: 13.6 % (ref 10–15)
ERYTHROCYTE [DISTWIDTH] IN BLOOD BY AUTOMATED COUNT: 13.7 % (ref 10–15)
ERYTHROCYTE [DISTWIDTH] IN BLOOD BY AUTOMATED COUNT: 13.8 % (ref 10–15)
ERYTHROCYTE [DISTWIDTH] IN BLOOD BY AUTOMATED COUNT: 13.9 % (ref 10–15)
ERYTHROCYTE [DISTWIDTH] IN BLOOD BY AUTOMATED COUNT: 14 % (ref 10–15)
ERYTHROCYTE [DISTWIDTH] IN BLOOD BY AUTOMATED COUNT: 14.2 % (ref 10–15)
ERYTHROCYTE [DISTWIDTH] IN BLOOD BY AUTOMATED COUNT: 14.2 % (ref 10–15)
ERYTHROCYTE [DISTWIDTH] IN BLOOD BY AUTOMATED COUNT: 14.4 % (ref 10–15)
ERYTHROCYTE [DISTWIDTH] IN BLOOD BY AUTOMATED COUNT: 14.4 % (ref 10–15)
ERYTHROCYTE [DISTWIDTH] IN BLOOD BY AUTOMATED COUNT: 14.6 % (ref 10–15)
ERYTHROCYTE [SEDIMENTATION RATE] IN BLOOD BY WESTERGREN METHOD: 10 MM/HR (ref 0–20)
ERYTHROCYTE [SEDIMENTATION RATE] IN BLOOD BY WESTERGREN METHOD: 33 MM/HR (ref 0–20)
ERYTHROCYTE [SEDIMENTATION RATE] IN BLOOD BY WESTERGREN METHOD: 6 MM/HR (ref 0–20)
FENTANYL UR QL: ABNORMAL
FIBRINOGEN PPP-MCNC: 240 MG/DL (ref 170–510)
FIBRINOGEN PPP-MCNC: 328 MG/DL (ref 170–510)
FIBRINOGEN PPP-MCNC: 362 MG/DL (ref 170–510)
FIBRINOGEN PPP-MCNC: 468 MG/DL (ref 170–510)
FIBRINOGEN PPP-MCNC: 551 MG/DL (ref 170–510)
GLUCOSE BLD-MCNC: 109 MG/DL (ref 70–99)
GLUCOSE BLD-MCNC: 113 MG/DL (ref 70–99)
GLUCOSE BLD-MCNC: 113 MG/DL (ref 70–99)
GLUCOSE BLD-MCNC: 114 MG/DL (ref 70–99)
GLUCOSE BLD-MCNC: 119 MG/DL (ref 70–99)
GLUCOSE BLD-MCNC: 120 MG/DL (ref 70–99)
GLUCOSE BLD-MCNC: 123 MG/DL (ref 70–99)
GLUCOSE BLD-MCNC: 129 MG/DL (ref 70–99)
GLUCOSE BLD-MCNC: 137 MG/DL (ref 70–99)
GLUCOSE BLD-MCNC: 171 MG/DL (ref 70–99)
GLUCOSE BLD-MCNC: 193 MG/DL (ref 70–99)
GLUCOSE BLD-MCNC: 200 MG/DL (ref 70–99)
GLUCOSE BLDC GLUCOMTR-MCNC: 100 MG/DL (ref 70–99)
GLUCOSE BLDC GLUCOMTR-MCNC: 107 MG/DL (ref 70–99)
GLUCOSE BLDC GLUCOMTR-MCNC: 109 MG/DL (ref 70–99)
GLUCOSE BLDC GLUCOMTR-MCNC: 110 MG/DL (ref 70–99)
GLUCOSE BLDC GLUCOMTR-MCNC: 112 MG/DL (ref 70–99)
GLUCOSE BLDC GLUCOMTR-MCNC: 114 MG/DL (ref 70–99)
GLUCOSE BLDC GLUCOMTR-MCNC: 114 MG/DL (ref 70–99)
GLUCOSE BLDC GLUCOMTR-MCNC: 117 MG/DL (ref 70–99)
GLUCOSE BLDC GLUCOMTR-MCNC: 117 MG/DL (ref 70–99)
GLUCOSE BLDC GLUCOMTR-MCNC: 119 MG/DL (ref 70–99)
GLUCOSE BLDC GLUCOMTR-MCNC: 120 MG/DL (ref 70–99)
GLUCOSE BLDC GLUCOMTR-MCNC: 123 MG/DL (ref 70–99)
GLUCOSE BLDC GLUCOMTR-MCNC: 123 MG/DL (ref 70–99)
GLUCOSE BLDC GLUCOMTR-MCNC: 125 MG/DL (ref 70–99)
GLUCOSE BLDC GLUCOMTR-MCNC: 128 MG/DL (ref 70–99)
GLUCOSE BLDC GLUCOMTR-MCNC: 129 MG/DL (ref 70–99)
GLUCOSE BLDC GLUCOMTR-MCNC: 131 MG/DL (ref 70–99)
GLUCOSE BLDC GLUCOMTR-MCNC: 131 MG/DL (ref 70–99)
GLUCOSE BLDC GLUCOMTR-MCNC: 133 MG/DL (ref 70–99)
GLUCOSE BLDC GLUCOMTR-MCNC: 134 MG/DL (ref 70–99)
GLUCOSE BLDC GLUCOMTR-MCNC: 156 MG/DL (ref 70–99)
GLUCOSE BLDC GLUCOMTR-MCNC: 161 MG/DL (ref 70–99)
GLUCOSE BLDC GLUCOMTR-MCNC: 161 MG/DL (ref 70–99)
GLUCOSE BLDC GLUCOMTR-MCNC: 168 MG/DL (ref 70–99)
GLUCOSE BLDC GLUCOMTR-MCNC: 175 MG/DL (ref 70–99)
GLUCOSE BLDC GLUCOMTR-MCNC: 178 MG/DL (ref 70–99)
GLUCOSE BLDC GLUCOMTR-MCNC: 200 MG/DL (ref 70–99)
GLUCOSE BLDC GLUCOMTR-MCNC: 220 MG/DL (ref 70–99)
GLUCOSE BLDC GLUCOMTR-MCNC: 95 MG/DL (ref 70–99)
GLUCOSE SERPL-MCNC: 114 MG/DL (ref 70–99)
GLUCOSE SERPL-MCNC: 116 MG/DL (ref 70–99)
GLUCOSE SERPL-MCNC: 118 MG/DL (ref 70–99)
GLUCOSE SERPL-MCNC: 119 MG/DL (ref 70–99)
GLUCOSE SERPL-MCNC: 122 MG/DL (ref 70–99)
GLUCOSE SERPL-MCNC: 125 MG/DL (ref 70–99)
GLUCOSE SERPL-MCNC: 130 MG/DL (ref 70–99)
GLUCOSE SERPL-MCNC: 135 MG/DL (ref 70–99)
GLUCOSE SERPL-MCNC: 142 MG/DL (ref 70–99)
GLUCOSE SERPL-MCNC: 182 MG/DL (ref 70–99)
GLUCOSE SERPL-MCNC: 228 MG/DL (ref 70–99)
GLUCOSE UR STRIP-MCNC: 70 MG/DL
GRAM STAIN RESULT: NORMAL
GRAM STAIN RESULT: NORMAL
HBA1C MFR BLD: 5.5 %
HCO3 BLD-SCNC: 18 MMOL/L (ref 21–28)
HCO3 BLD-SCNC: 19 MMOL/L (ref 21–28)
HCO3 BLD-SCNC: 20 MMOL/L (ref 21–28)
HCO3 BLD-SCNC: 21 MMOL/L (ref 21–28)
HCO3 BLD-SCNC: 22 MMOL/L (ref 21–28)
HCO3 BLD-SCNC: 23 MMOL/L (ref 21–28)
HCO3 BLD-SCNC: 24 MMOL/L (ref 21–28)
HCO3 BLD-SCNC: 24 MMOL/L (ref 21–28)
HCO3 BLD-SCNC: 25 MMOL/L (ref 21–28)
HCO3 BLDA-SCNC: 18 MMOL/L (ref 21–28)
HCO3 BLDA-SCNC: 20 MMOL/L (ref 21–28)
HCO3 BLDA-SCNC: 22 MMOL/L (ref 21–28)
HCO3 BLDV-SCNC: 19 MMOL/L (ref 21–28)
HCO3 BLDV-SCNC: 21 MMOL/L (ref 21–28)
HCO3 BLDV-SCNC: 22 MMOL/L (ref 21–28)
HCO3 BLDV-SCNC: 23 MMOL/L (ref 21–28)
HCO3 BLDV-SCNC: 24 MMOL/L (ref 21–28)
HCO3 SERPL-SCNC: 18 MMOL/L (ref 22–29)
HCO3 SERPL-SCNC: 18 MMOL/L (ref 22–29)
HCO3 SERPL-SCNC: 19 MMOL/L (ref 22–29)
HCO3 SERPL-SCNC: 21 MMOL/L (ref 22–29)
HCO3 SERPL-SCNC: 22 MMOL/L (ref 22–29)
HCT VFR BLD AUTO: 31.9 % (ref 40–53)
HCT VFR BLD AUTO: 32.8 % (ref 40–53)
HCT VFR BLD AUTO: 32.8 % (ref 40–53)
HCT VFR BLD AUTO: 34.1 % (ref 40–53)
HCT VFR BLD AUTO: 34.4 % (ref 40–53)
HCT VFR BLD AUTO: 39.5 % (ref 40–53)
HCT VFR BLD AUTO: 41.9 % (ref 40–53)
HCT VFR BLD AUTO: 42 % (ref 40–53)
HCT VFR BLD AUTO: 44.9 % (ref 40–53)
HGB BLD-MCNC: 11 G/DL (ref 13.3–17.7)
HGB BLD-MCNC: 11.2 G/DL (ref 13.3–17.7)
HGB BLD-MCNC: 11.4 G/DL (ref 13.3–17.7)
HGB BLD-MCNC: 11.6 G/DL (ref 13.3–17.7)
HGB BLD-MCNC: 12.2 G/DL (ref 13.3–17.7)
HGB BLD-MCNC: 12.3 G/DL (ref 13.3–17.7)
HGB BLD-MCNC: 12.7 G/DL (ref 13.3–17.7)
HGB BLD-MCNC: 14.2 G/DL (ref 13.3–17.7)
HGB BLD-MCNC: 14.6 G/DL (ref 13.3–17.7)
HGB BLD-MCNC: 14.7 G/DL (ref 13.3–17.7)
HGB BLD-MCNC: 15.2 G/DL (ref 13.3–17.7)
HGB BLD-MCNC: 15.3 G/DL (ref 13.3–17.7)
HGB BLD-MCNC: 16 G/DL (ref 13.3–17.7)
HGB FREE PLAS-MCNC: 30 MG/DL
HGB FREE PLAS-MCNC: 30 MG/DL
HGB FREE PLAS-MCNC: 80 MG/DL
HGB UR QL STRIP: ABNORMAL
IMM GRANULOCYTES # BLD: 0.2 10E3/UL
IMM GRANULOCYTES NFR BLD: 2 %
INR PPP: 1.51 (ref 0.85–1.15)
INR PPP: 1.55 (ref 0.85–1.15)
INR PPP: 1.6 (ref 0.85–1.15)
INR PPP: 1.64 (ref 0.85–1.15)
INR PPP: 1.67 (ref 0.85–1.15)
INR PPP: 1.81 (ref 0.85–1.15)
INR PPP: 1.9 (ref 0.85–1.15)
INR PPP: 1.9 (ref 0.85–1.15)
INR PPP: 1.92 (ref 0.85–1.15)
INR PPP: 2.01 (ref 0.85–1.15)
INTERPRETATION ECG - MUSE: NORMAL
INTERPRETATION TEGPIA: NORMAL
INTERPRETATION TEGPIA: NORMAL
ISSUE DATE AND TIME: NORMAL
ISSUE DATE AND TIME: NORMAL
KETONES UR STRIP-MCNC: NEGATIVE MG/DL
LACTATE BLD-SCNC: 12.4 MMOL/L (ref 0.7–2)
LACTATE BLD-SCNC: 12.4 MMOL/L (ref 0.7–2)
LACTATE SERPL-SCNC: 11.2 MMOL/L (ref 0.7–2)
LACTATE SERPL-SCNC: 12.4 MMOL/L (ref 0.7–2)
LACTATE SERPL-SCNC: 2.1 MMOL/L (ref 0.7–2)
LACTATE SERPL-SCNC: 2.4 MMOL/L (ref 0.7–2)
LACTATE SERPL-SCNC: 2.4 MMOL/L (ref 0.7–2)
LACTATE SERPL-SCNC: 2.5 MMOL/L (ref 0.7–2)
LACTATE SERPL-SCNC: 2.6 MMOL/L (ref 0.7–2)
LACTATE SERPL-SCNC: 2.9 MMOL/L (ref 0.7–2)
LACTATE SERPL-SCNC: 3.3 MMOL/L (ref 0.7–2)
LACTATE SERPL-SCNC: 4.8 MMOL/L (ref 0.7–2)
LACTATE SERPL-SCNC: 6.1 MMOL/L (ref 0.7–2)
LACTATE SERPL-SCNC: 7.6 MMOL/L (ref 0.7–2)
LACTATE SERPL-SCNC: 7.8 MMOL/L (ref 0.7–2)
LACTATE SERPL-SCNC: 8 MMOL/L (ref 0.7–2)
LACTATE SERPL-SCNC: 8.3 MMOL/L (ref 0.7–2)
LDH SERPL L TO P-CCNC: 2343 U/L (ref 0–250)
LDH SERPL L TO P-CCNC: 3436 U/L (ref 0–250)
LEUKOCYTE ESTERASE UR QL STRIP: NEGATIVE
LVEF ECHO: NORMAL
LYMPHOCYTES # BLD AUTO: 1.3 10E3/UL (ref 0.8–5.3)
LYMPHOCYTES NFR BLD AUTO: 10 %
MAGNESIUM SERPL-MCNC: 1.9 MG/DL (ref 1.7–2.3)
MAGNESIUM SERPL-MCNC: 2 MG/DL (ref 1.7–2.3)
MAGNESIUM SERPL-MCNC: 2.3 MG/DL (ref 1.7–2.3)
MAGNESIUM SERPL-MCNC: 2.7 MG/DL (ref 1.7–2.3)
MCF P HPASE BLD TEG: 50 MM (ref 50–70)
MCF P HPASE BLD TEG: 50.8 MM (ref 50–70)
MCF P HPASE BLD TEG: 64.1 MM (ref 50–70)
MCH RBC QN AUTO: 31.1 PG (ref 26.5–33)
MCH RBC QN AUTO: 31.2 PG (ref 26.5–33)
MCH RBC QN AUTO: 31.3 PG (ref 26.5–33)
MCH RBC QN AUTO: 31.4 PG (ref 26.5–33)
MCH RBC QN AUTO: 31.5 PG (ref 26.5–33)
MCH RBC QN AUTO: 31.6 PG (ref 26.5–33)
MCH RBC QN AUTO: 31.7 PG (ref 26.5–33)
MCH RBC QN AUTO: 32 PG (ref 26.5–33)
MCH RBC QN AUTO: 32.1 PG (ref 26.5–33)
MCHC RBC AUTO-ENTMCNC: 34.5 G/DL (ref 31.5–36.5)
MCHC RBC AUTO-ENTMCNC: 35.1 G/DL (ref 31.5–36.5)
MCHC RBC AUTO-ENTMCNC: 35.1 G/DL (ref 31.5–36.5)
MCHC RBC AUTO-ENTMCNC: 35.4 G/DL (ref 31.5–36.5)
MCHC RBC AUTO-ENTMCNC: 35.6 G/DL (ref 31.5–36.5)
MCHC RBC AUTO-ENTMCNC: 35.7 G/DL (ref 31.5–36.5)
MCHC RBC AUTO-ENTMCNC: 35.9 G/DL (ref 31.5–36.5)
MCHC RBC AUTO-ENTMCNC: 36.1 G/DL (ref 31.5–36.5)
MCHC RBC AUTO-ENTMCNC: 36.2 G/DL (ref 31.5–36.5)
MCHC RBC AUTO-ENTMCNC: 36.9 G/DL (ref 31.5–36.5)
MCHC RBC AUTO-ENTMCNC: 37.2 G/DL (ref 31.5–36.5)
MCV RBC AUTO: 85 FL (ref 78–100)
MCV RBC AUTO: 86 FL (ref 78–100)
MCV RBC AUTO: 87 FL (ref 78–100)
MCV RBC AUTO: 88 FL (ref 78–100)
MCV RBC AUTO: 89 FL (ref 78–100)
MCV RBC AUTO: 89 FL (ref 78–100)
MCV RBC AUTO: 90 FL (ref 78–100)
MCV RBC AUTO: 90 FL (ref 78–100)
MONOCYTES # BLD AUTO: 0.2 10E3/UL (ref 0–1.3)
MONOCYTES NFR BLD AUTO: 2 %
MRSA DNA SPEC QL NAA+PROBE: NEGATIVE
MUCOUS THREADS #/AREA URNS LPF: PRESENT /LPF
NEUTROPHILS # BLD AUTO: 11 10E3/UL (ref 1.6–8.3)
NEUTROPHILS NFR BLD AUTO: 86 %
NITRATE UR QL: NEGATIVE
NRBC # BLD AUTO: 0 10E3/UL
NRBC BLD AUTO-RTO: 0 /100
NSE SERPL IA-MCNC: 101.1 NG/ML
O2/TOTAL GAS SETTING VFR VENT: 100 %
O2/TOTAL GAS SETTING VFR VENT: 40 %
O2/TOTAL GAS SETTING VFR VENT: 40 %
O2/TOTAL GAS SETTING VFR VENT: 50 %
O2/TOTAL GAS SETTING VFR VENT: 55 %
O2/TOTAL GAS SETTING VFR VENT: 60 %
O2/TOTAL GAS SETTING VFR VENT: 80 %
O2/TOTAL GAS SETTING VFR VENT: 90 %
O2/TOTAL GAS SETTING VFR VENT: 90 %
OPIATES UR QL SCN: ABNORMAL
OXYHGB MFR BLDA: 97 % (ref 75–100)
OXYHGB MFR BLDA: 97 % (ref 75–100)
OXYHGB MFR BLDA: 97 % (ref 92–100)
OXYHGB MFR BLDA: 97 % (ref 92–100)
OXYHGB MFR BLDA: 98 % (ref 75–100)
OXYHGB MFR BLDA: 98 % (ref 92–100)
OXYHGB MFR BLDA: 98 % (ref 92–100)
OXYHGB MFR BLDA: 99 % (ref 75–100)
OXYHGB MFR BLDV: 69 %
OXYHGB MFR BLDV: 74 %
OXYHGB MFR BLDV: 78 %
OXYHGB MFR BLDV: 78 %
OXYHGB MFR BLDV: 79 %
OXYHGB MFR BLDV: 80 %
OXYHGB MFR BLDV: 93 % (ref 70–75)
P AXIS - MUSE: 108 DEGREES
P AXIS - MUSE: 36 DEGREES
P AXIS - MUSE: 49 DEGREES
PA AA BLD-ACNC: 64 %
PA AA BLD-ACNC: 94 %
PA ADP BLD-ACNC: 73 %
PA ADP BLD-ACNC: 88 %
PCO2 BLD: 31 MM HG (ref 35–45)
PCO2 BLD: 34 MM HG (ref 35–45)
PCO2 BLD: 35 MM HG (ref 35–45)
PCO2 BLD: 36 MM HG (ref 35–45)
PCO2 BLD: 36 MM HG (ref 35–45)
PCO2 BLD: 37 MM HG (ref 35–45)
PCO2 BLD: 38 MM HG (ref 35–45)
PCO2 BLD: 39 MM HG (ref 35–45)
PCO2 BLD: 39 MM HG (ref 35–45)
PCO2 BLD: 40 MM HG (ref 35–45)
PCO2 BLD: 41 MM HG (ref 35–45)
PCO2 BLD: 41 MM HG (ref 35–45)
PCO2 BLD: 42 MM HG (ref 35–45)
PCO2 BLD: 43 MM HG (ref 35–45)
PCO2 BLD: 43 MM HG (ref 35–45)
PCO2 BLD: 44 MM HG (ref 35–45)
PCO2 BLD: 44 MM HG (ref 35–45)
PCO2 BLD: 46 MM HG (ref 35–45)
PCO2 BLD: 50 MM HG (ref 35–45)
PCO2 BLD: 50 MM HG (ref 35–45)
PCO2 BLDA: 34 MM HG (ref 35–45)
PCO2 BLDA: 36 MM HG (ref 35–45)
PCO2 BLDA: 37 MM HG (ref 35–45)
PCO2 BLDA: 39 MM HG (ref 35–45)
PCO2 BLDA: 40 MM HG (ref 35–45)
PCO2 BLDA: 41 MM HG (ref 35–45)
PCO2 BLDA: 42 MM HG (ref 35–45)
PCO2 BLDA: 43 MM HG (ref 35–45)
PCO2 BLDV: 39 MM HG (ref 40–50)
PCO2 BLDV: 40 MM HG (ref 40–50)
PCO2 BLDV: 42 MM HG (ref 40–50)
PCO2 BLDV: 44 MM HG (ref 40–50)
PCO2 BLDV: 46 MM HG (ref 40–50)
PCO2 BLDV: 47 MM HG (ref 40–50)
PCO2 BLDV: 49 MM HG (ref 40–50)
PCP QUAL URINE (ROCHE): ABNORMAL
PEEP: 12 CM H2O
PEEP: 12 CM H2O
PEEP: 5 CM H2O
PEEP: 7 CM H2O
PEEP: 8 CM H2O
PH BLD: 7.23 [PH] (ref 7.35–7.45)
PH BLD: 7.24 [PH] (ref 7.35–7.45)
PH BLD: 7.26 [PH] (ref 7.35–7.45)
PH BLD: 7.26 [PH] (ref 7.35–7.45)
PH BLD: 7.27 [PH] (ref 7.35–7.45)
PH BLD: 7.3 [PH] (ref 7.35–7.45)
PH BLD: 7.3 [PH] (ref 7.35–7.45)
PH BLD: 7.32 [PH] (ref 7.35–7.45)
PH BLD: 7.33 [PH] (ref 7.35–7.45)
PH BLD: 7.33 [PH] (ref 7.35–7.45)
PH BLD: 7.34 [PH] (ref 7.35–7.45)
PH BLD: 7.35 [PH] (ref 7.35–7.45)
PH BLD: 7.36 [PH] (ref 7.35–7.45)
PH BLD: 7.36 [PH] (ref 7.35–7.45)
PH BLD: 7.37 [PH] (ref 7.35–7.45)
PH BLD: 7.38 [PH] (ref 7.35–7.45)
PH BLD: 7.39 [PH] (ref 7.35–7.45)
PH BLD: 7.39 [PH] (ref 7.35–7.45)
PH BLD: 7.4 [PH] (ref 7.35–7.45)
PH BLD: 7.4 [PH] (ref 7.35–7.45)
PH BLD: 7.44 [PH] (ref 7.35–7.45)
PH BLDA: 7.26 [PH] (ref 7.35–7.45)
PH BLDA: 7.29 [PH] (ref 7.35–7.45)
PH BLDA: 7.3 [PH] (ref 7.35–7.45)
PH BLDA: 7.32 [PH] (ref 7.35–7.45)
PH BLDA: 7.34 [PH] (ref 7.35–7.45)
PH BLDA: 7.37 [PH] (ref 7.35–7.45)
PH BLDA: 7.38 [PH] (ref 7.35–7.45)
PH BLDA: 7.4 [PH] (ref 7.35–7.45)
PH BLDV: 7.27 [PH] (ref 7.32–7.43)
PH BLDV: 7.28 [PH] (ref 7.32–7.43)
PH BLDV: 7.3 [PH] (ref 7.32–7.43)
PH BLDV: 7.31 [PH] (ref 7.32–7.43)
PH BLDV: 7.34 [PH] (ref 7.32–7.43)
PH BLDV: 7.35 [PH] (ref 7.32–7.43)
PH BLDV: 7.37 [PH] (ref 7.32–7.43)
PH UR STRIP: 6 [PH] (ref 5–7)
PHOSPHATE SERPL-MCNC: 1 MG/DL (ref 2.5–4.5)
PHOSPHATE SERPL-MCNC: 4 MG/DL (ref 2.5–4.5)
PHOSPHATE SERPL-MCNC: 4.6 MG/DL (ref 2.5–4.5)
PHOSPHATE SERPL-MCNC: 4.9 MG/DL (ref 2.5–4.5)
PLATELET # BLD AUTO: 100 10E3/UL (ref 150–450)
PLATELET # BLD AUTO: 103 10E3/UL (ref 150–450)
PLATELET # BLD AUTO: 121 10E3/UL (ref 150–450)
PLATELET # BLD AUTO: 147 10E3/UL (ref 150–450)
PLATELET # BLD AUTO: 177 10E3/UL (ref 150–450)
PLATELET # BLD AUTO: 206 10E3/UL (ref 150–450)
PLATELET # BLD AUTO: 79 10E3/UL (ref 150–450)
PLATELET # BLD AUTO: 83 10E3/UL (ref 150–450)
PLATELET # BLD AUTO: 89 10E3/UL (ref 150–450)
PLATELET # BLD AUTO: 92 10E3/UL (ref 150–450)
PLATELET # BLD AUTO: 95 10E3/UL (ref 150–450)
PO2 BLD: 104 MM HG (ref 80–105)
PO2 BLD: 114 MM HG (ref 80–105)
PO2 BLD: 115 MM HG (ref 80–105)
PO2 BLD: 123 MM HG (ref 80–105)
PO2 BLD: 124 MM HG (ref 80–105)
PO2 BLD: 125 MM HG (ref 80–105)
PO2 BLD: 129 MM HG (ref 80–105)
PO2 BLD: 138 MM HG (ref 80–105)
PO2 BLD: 140 MM HG (ref 80–105)
PO2 BLD: 148 MM HG (ref 80–105)
PO2 BLD: 149 MM HG (ref 80–105)
PO2 BLD: 150 MM HG (ref 80–105)
PO2 BLD: 170 MM HG (ref 80–105)
PO2 BLD: 183 MM HG (ref 80–105)
PO2 BLD: 50 MM HG (ref 80–105)
PO2 BLD: 55 MM HG (ref 80–105)
PO2 BLD: 55 MM HG (ref 80–105)
PO2 BLD: 58 MM HG (ref 80–105)
PO2 BLD: 58 MM HG (ref 80–105)
PO2 BLD: 62 MM HG (ref 80–105)
PO2 BLD: 68 MM HG (ref 80–105)
PO2 BLD: 71 MM HG (ref 80–105)
PO2 BLD: 75 MM HG (ref 80–105)
PO2 BLD: 77 MM HG (ref 80–105)
PO2 BLD: 82 MM HG (ref 80–105)
PO2 BLD: 83 MM HG (ref 80–105)
PO2 BLD: 87 MM HG (ref 80–105)
PO2 BLD: 87 MM HG (ref 80–105)
PO2 BLD: 99 MM HG (ref 80–105)
PO2 BLDA: 112 MM HG (ref 80–105)
PO2 BLDA: 135 MM HG (ref 80–105)
PO2 BLDA: 147 MM HG (ref 80–105)
PO2 BLDA: 151 MM HG (ref 80–105)
PO2 BLDA: 152 MM HG (ref 80–105)
PO2 BLDA: 245 MM HG (ref 80–105)
PO2 BLDA: 261 MM HG (ref 80–105)
PO2 BLDA: 544 MM HG (ref 80–105)
PO2 BLDV: 36 MM HG (ref 25–47)
PO2 BLDV: 41 MM HG (ref 25–47)
PO2 BLDV: 42 MM HG (ref 25–47)
PO2 BLDV: 43 MM HG (ref 25–47)
PO2 BLDV: 43 MM HG (ref 25–47)
PO2 BLDV: 47 MM HG (ref 25–47)
PO2 BLDV: 75 MM HG (ref 25–47)
POTASSIUM BLD-SCNC: 2.6 MMOL/L (ref 3.4–5.3)
POTASSIUM BLD-SCNC: 3.4 MMOL/L (ref 3.4–5.3)
POTASSIUM BLD-SCNC: 4.2 MMOL/L (ref 3.4–5.3)
POTASSIUM SERPL-SCNC: 2.9 MMOL/L (ref 3.4–5.3)
POTASSIUM SERPL-SCNC: 3.3 MMOL/L (ref 3.4–5.3)
POTASSIUM SERPL-SCNC: 3.5 MMOL/L (ref 3.4–5.3)
POTASSIUM SERPL-SCNC: 3.6 MMOL/L (ref 3.4–5.3)
POTASSIUM SERPL-SCNC: 4.1 MMOL/L (ref 3.4–5.3)
POTASSIUM SERPL-SCNC: 4.3 MMOL/L (ref 3.4–5.3)
POTASSIUM SERPL-SCNC: 4.3 MMOL/L (ref 3.4–5.3)
POTASSIUM SERPL-SCNC: 4.4 MMOL/L (ref 3.4–5.3)
POTASSIUM SERPL-SCNC: 4.8 MMOL/L (ref 3.4–5.3)
POTASSIUM SERPL-SCNC: 4.9 MMOL/L (ref 3.4–5.3)
POTASSIUM SERPL-SCNC: 5 MMOL/L (ref 3.4–5.3)
PR INTERVAL - MUSE: 126 MS
PR INTERVAL - MUSE: 220 MS
PR INTERVAL - MUSE: 244 MS
PROCALCITONIN SERPL IA-MCNC: 0.49 NG/ML
PROCALCITONIN SERPL IA-MCNC: 97.9 NG/ML
PROT SERPL-MCNC: 4.6 G/DL (ref 6.4–8.3)
PROT SERPL-MCNC: 4.7 G/DL (ref 6.4–8.3)
PROT SERPL-MCNC: 4.7 G/DL (ref 6.4–8.3)
PROT SERPL-MCNC: 4.8 G/DL (ref 6.4–8.3)
PROT SERPL-MCNC: 4.9 G/DL (ref 6.4–8.3)
PROT SERPL-MCNC: 4.9 G/DL (ref 6.4–8.3)
PROT SERPL-MCNC: 5 G/DL (ref 6.4–8.3)
PROT SERPL-MCNC: 5 G/DL (ref 6.4–8.3)
PROT SERPL-MCNC: 5.8 G/DL (ref 6.4–8.3)
QRS DURATION - MUSE: 100 MS
QRS DURATION - MUSE: 122 MS
QRS DURATION - MUSE: 82 MS
QT - MUSE: 346 MS
QT - MUSE: 494 MS
QT - MUSE: 514 MS
QTC - MUSE: 453 MS
QTC - MUSE: 494 MS
QTC - MUSE: 521 MS
R AXIS - MUSE: 156 DEGREES
R AXIS - MUSE: 69 DEGREES
R AXIS - MUSE: 71 DEGREES
RADIOLOGIST FLAGS: ABNORMAL
RBC # BLD AUTO: 3.54 10E6/UL (ref 4.4–5.9)
RBC # BLD AUTO: 3.56 10E6/UL (ref 4.4–5.9)
RBC # BLD AUTO: 3.6 10E6/UL (ref 4.4–5.9)
RBC # BLD AUTO: 3.71 10E6/UL (ref 4.4–5.9)
RBC # BLD AUTO: 3.8 10E6/UL (ref 4.4–5.9)
RBC # BLD AUTO: 3.89 10E6/UL (ref 4.4–5.9)
RBC # BLD AUTO: 4.04 10E6/UL (ref 4.4–5.9)
RBC # BLD AUTO: 4.53 10E6/UL (ref 4.4–5.9)
RBC # BLD AUTO: 4.71 10E6/UL (ref 4.4–5.9)
RBC # BLD AUTO: 4.75 10E6/UL (ref 4.4–5.9)
RBC # BLD AUTO: 5.11 10E6/UL (ref 4.4–5.9)
RBC URINE: 14 /HPF
SA TARGET DNA: NEGATIVE
SAO2 % BLDA: 86 % (ref 92–100)
SAO2 % BLDA: 88 % (ref 92–100)
SAO2 % BLDA: 89 % (ref 92–100)
SAO2 % BLDA: 90 % (ref 92–100)
SAO2 % BLDA: 91 % (ref 92–100)
SAO2 % BLDA: 91 % (ref 92–100)
SAO2 % BLDA: 92 % (ref 92–100)
SAO2 % BLDA: 92 % (ref 92–100)
SAO2 % BLDA: 93 % (ref 92–100)
SAO2 % BLDA: 94 % (ref 92–100)
SAO2 % BLDA: 95 % (ref 92–100)
SAO2 % BLDA: 96 % (ref 92–100)
SAO2 % BLDA: 96 % (ref 92–100)
SAO2 % BLDA: 97 % (ref 92–100)
SAO2 % BLDA: 98 % (ref 92–100)
SAO2 % BLDA: 99 % (ref 92–100)
SAO2 % BLDA: 99.1 % (ref 96–97)
SAO2 % BLDA: 99.7 % (ref 96–97)
SAO2 % BLDA: 99.8 % (ref 96–97)
SAO2 % BLDA: 99.9 % (ref 96–97)
SAO2 % BLDA: >100 % (ref 96–97)
SAO2 % BLDA: >100 % (ref 96–97)
SAO2 % BLDV: 70.4 % (ref 70–75)
SAO2 % BLDV: 76.1 % (ref 70–75)
SAO2 % BLDV: 79.3 % (ref 70–75)
SAO2 % BLDV: 79.8 % (ref 70–75)
SAO2 % BLDV: 81.2 % (ref 70–75)
SAO2 % BLDV: 81.3 % (ref 70–75)
SAO2 % BLDV: 95.5 % (ref 70–75)
SODIUM BLD-SCNC: 147 MMOL/L (ref 135–145)
SODIUM BLD-SCNC: 149 MMOL/L (ref 135–145)
SODIUM SERPL-SCNC: 147 MMOL/L (ref 135–145)
SODIUM SERPL-SCNC: 148 MMOL/L (ref 135–145)
SODIUM SERPL-SCNC: 149 MMOL/L (ref 135–145)
SODIUM SERPL-SCNC: 150 MMOL/L (ref 135–145)
SP GR UR STRIP: 1.01 (ref 1–1.03)
SPECIMEN EXPIRATION DATE: NORMAL
SPERM #/AREA URNS HPF: PRESENT /HPF
SQUAMOUS EPITHELIAL: 1 /HPF
SYSTOLIC BLOOD PRESSURE - MUSE: NORMAL MMHG
T AXIS - MUSE: 103 DEGREES
T AXIS - MUSE: 160 DEGREES
T AXIS - MUSE: 227 DEGREES
T3 SERPL-MCNC: 125 NG/DL (ref 85–202)
T3 SERPL-MCNC: 42 NG/DL (ref 85–202)
T3 SERPL-MCNC: 73 NG/DL (ref 85–202)
T3FREE SERPL-MCNC: 1.1 PG/ML (ref 2–4.4)
T3FREE SERPL-MCNC: 1.9 PG/ML (ref 2–4.4)
T3FREE SERPL-MCNC: 3.3 PG/ML (ref 2–4.4)
T4 FREE SERPL-MCNC: 0.93 NG/DL (ref 0.9–1.7)
T4 FREE SERPL-MCNC: 1.09 NG/DL (ref 0.9–1.7)
T4 FREE SERPL-MCNC: 1.2 NG/DL (ref 0.9–1.7)
TRANSITIONAL EPI: <1 /HPF
TROPONIN T SERPL HS-MCNC: ABNORMAL NG/L
TSH SERPL DL<=0.005 MIU/L-ACNC: 1.58 UIU/ML (ref 0.3–4.2)
TSH SERPL DL<=0.005 MIU/L-ACNC: 16.9 UIU/ML (ref 0.3–4.2)
TSH SERPL DL<=0.005 MIU/L-ACNC: 6.82 UIU/ML (ref 0.3–4.2)
UFH PPP CHRO-ACNC: 0.1 IU/ML
UFH PPP CHRO-ACNC: 0.11 IU/ML
UFH PPP CHRO-ACNC: 0.11 IU/ML
UFH PPP CHRO-ACNC: 0.13 IU/ML
UFH PPP CHRO-ACNC: <0.1 IU/ML
UNIT ABO/RH: NORMAL
UNIT ABO/RH: NORMAL
UNIT NUMBER: NORMAL
UNIT NUMBER: NORMAL
UNIT STATUS: NORMAL
UNIT STATUS: NORMAL
UNIT TYPE ISBT: 6200
UNIT TYPE ISBT: 6200
UROBILINOGEN UR STRIP-MCNC: NORMAL MG/DL
VENTRICULAR RATE- MUSE: 103 BPM
VENTRICULAR RATE- MUSE: 60 BPM
VENTRICULAR RATE- MUSE: 62 BPM
WBC # BLD AUTO: 10.2 10E3/UL (ref 4–11)
WBC # BLD AUTO: 10.4 10E3/UL (ref 4–11)
WBC # BLD AUTO: 10.5 10E3/UL (ref 4–11)
WBC # BLD AUTO: 12.8 10E3/UL (ref 4–11)
WBC # BLD AUTO: 13.2 10E3/UL (ref 4–11)
WBC # BLD AUTO: 14.6 10E3/UL (ref 4–11)
WBC # BLD AUTO: 17.8 10E3/UL (ref 4–11)
WBC # BLD AUTO: 7.6 10E3/UL (ref 4–11)
WBC # BLD AUTO: 8.7 10E3/UL (ref 4–11)
WBC # BLD AUTO: 8.9 10E3/UL (ref 4–11)
WBC # BLD AUTO: 9.3 10E3/UL (ref 4–11)
WBC URINE: 10 /HPF

## 2024-01-01 PROCEDURE — 250N000011 HC RX IP 250 OP 636: Performed by: INTERNAL MEDICINE

## 2024-01-01 PROCEDURE — 84439 ASSAY OF FREE THYROXINE: CPT | Performed by: NURSE PRACTITIONER

## 2024-01-01 PROCEDURE — 84295 ASSAY OF SERUM SODIUM: CPT

## 2024-01-01 PROCEDURE — 82330 ASSAY OF CALCIUM: CPT

## 2024-01-01 PROCEDURE — 272N000237 HC CARDIOHELP CIRCUIT

## 2024-01-01 PROCEDURE — 250N000013 HC RX MED GY IP 250 OP 250 PS 637: Performed by: NURSE PRACTITIONER

## 2024-01-01 PROCEDURE — 33952 ECMO/ECLS INSJ PRPH CANNULA: CPT | Performed by: INTERNAL MEDICINE

## 2024-01-01 PROCEDURE — 85652 RBC SED RATE AUTOMATED: CPT | Performed by: NURSE PRACTITIONER

## 2024-01-01 PROCEDURE — 36620 INSERTION CATHETER ARTERY: CPT | Mod: XU | Performed by: INTERNAL MEDICINE

## 2024-01-01 PROCEDURE — 70450 CT HEAD/BRAIN W/O DYE: CPT

## 2024-01-01 PROCEDURE — 86316 IMMUNOASSAY TUMOR OTHER: CPT | Performed by: NURSE PRACTITIONER

## 2024-01-01 PROCEDURE — 82805 BLOOD GASES W/O2 SATURATION: CPT

## 2024-01-01 PROCEDURE — 83735 ASSAY OF MAGNESIUM: CPT

## 2024-01-01 PROCEDURE — 87641 MR-STAPH DNA AMP PROBE: CPT | Performed by: NURSE PRACTITIONER

## 2024-01-01 PROCEDURE — B2111ZZ FLUOROSCOPY OF MULTIPLE CORONARY ARTERIES USING LOW OSMOLAR CONTRAST: ICD-10-PCS | Performed by: INTERNAL MEDICINE

## 2024-01-01 PROCEDURE — 85379 FIBRIN DEGRADATION QUANT: CPT

## 2024-01-01 PROCEDURE — 5A02210 ASSISTANCE WITH CARDIAC OUTPUT USING BALLOON PUMP, CONTINUOUS: ICD-10-PCS | Performed by: INTERNAL MEDICINE

## 2024-01-01 PROCEDURE — 999N000185 HC STATISTIC TRANSPORT TIME EA 15 MIN

## 2024-01-01 PROCEDURE — 250N000011 HC RX IP 250 OP 636: Mod: JZ

## 2024-01-01 PROCEDURE — 81001 URINALYSIS AUTO W/SCOPE: CPT | Performed by: NURSE PRACTITIONER

## 2024-01-01 PROCEDURE — 84075 ASSAY ALKALINE PHOSPHATASE: CPT

## 2024-01-01 PROCEDURE — 99291 CRITICAL CARE FIRST HOUR: CPT | Mod: 25 | Performed by: INTERNAL MEDICINE

## 2024-01-01 PROCEDURE — 83615 LACTATE (LD) (LDH) ENZYME: CPT | Performed by: NURSE PRACTITIONER

## 2024-01-01 PROCEDURE — 33967 INSERT I-AORT PERCUT DEVICE: CPT | Mod: GC | Performed by: INTERNAL MEDICINE

## 2024-01-01 PROCEDURE — 343N000001 HC RX 343: Performed by: INTERNAL MEDICINE

## 2024-01-01 PROCEDURE — 258N000003 HC RX IP 258 OP 636: Performed by: STUDENT IN AN ORGANIZED HEALTH CARE EDUCATION/TRAINING PROGRAM

## 2024-01-01 PROCEDURE — 84480 ASSAY TRIIODOTHYRONINE (T3): CPT | Performed by: NURSE PRACTITIONER

## 2024-01-01 PROCEDURE — 95711 VEEG 2-12 HR UNMONITORED: CPT

## 2024-01-01 PROCEDURE — C1769 GUIDE WIRE: HCPCS | Performed by: INTERNAL MEDICINE

## 2024-01-01 PROCEDURE — 85300 ANTITHROMBIN III ACTIVITY: CPT | Performed by: NURSE PRACTITIONER

## 2024-01-01 PROCEDURE — 86140 C-REACTIVE PROTEIN: CPT | Performed by: NURSE PRACTITIONER

## 2024-01-01 PROCEDURE — 250N000009 HC RX 250

## 2024-01-01 PROCEDURE — 93005 ELECTROCARDIOGRAM TRACING: CPT

## 2024-01-01 PROCEDURE — 85384 FIBRINOGEN ACTIVITY: CPT

## 2024-01-01 PROCEDURE — 84484 ASSAY OF TROPONIN QUANT: CPT

## 2024-01-01 PROCEDURE — C1894 INTRO/SHEATH, NON-LASER: HCPCS | Performed by: INTERNAL MEDICINE

## 2024-01-01 PROCEDURE — 250N000009 HC RX 250: Performed by: INTERNAL MEDICINE

## 2024-01-01 PROCEDURE — 84155 ASSAY OF PROTEIN SERUM: CPT

## 2024-01-01 PROCEDURE — 33947 ECMO/ECLS INITIATION ARTERY: CPT | Mod: GC | Performed by: INTERNAL MEDICINE

## 2024-01-01 PROCEDURE — 84443 ASSAY THYROID STIM HORMONE: CPT | Performed by: NURSE PRACTITIONER

## 2024-01-01 PROCEDURE — 250N000013 HC RX MED GY IP 250 OP 250 PS 637: Performed by: INTERNAL MEDICINE

## 2024-01-01 PROCEDURE — 255N000002 HC RX 255 OP 636: Performed by: INTERNAL MEDICINE

## 2024-01-01 PROCEDURE — 258N000003 HC RX IP 258 OP 636: Performed by: NURSE PRACTITIONER

## 2024-01-01 PROCEDURE — 85520 HEPARIN ASSAY: CPT

## 2024-01-01 PROCEDURE — 85048 AUTOMATED LEUKOCYTE COUNT: CPT

## 2024-01-01 PROCEDURE — 84450 TRANSFERASE (AST) (SGOT): CPT

## 2024-01-01 PROCEDURE — 70450 CT HEAD/BRAIN W/O DYE: CPT | Mod: 26 | Performed by: RADIOLOGY

## 2024-01-01 PROCEDURE — 250N000009 HC RX 250: Performed by: NURSE PRACTITIONER

## 2024-01-01 PROCEDURE — 85004 AUTOMATED DIFF WBC COUNT: CPT | Performed by: NURSE PRACTITIONER

## 2024-01-01 PROCEDURE — 99153 MOD SED SAME PHYS/QHP EA: CPT

## 2024-01-01 PROCEDURE — 85396 CLOTTING ASSAY WHOLE BLOOD: CPT | Performed by: NURSE PRACTITIONER

## 2024-01-01 PROCEDURE — 3E043XZ INTRODUCTION OF VASOPRESSOR INTO CENTRAL VEIN, PERCUTANEOUS APPROACH: ICD-10-PCS | Performed by: NURSE PRACTITIONER

## 2024-01-01 PROCEDURE — 258N000003 HC RX IP 258 OP 636: Performed by: INTERNAL MEDICINE

## 2024-01-01 PROCEDURE — 83605 ASSAY OF LACTIC ACID: CPT

## 2024-01-01 PROCEDURE — 84145 PROCALCITONIN (PCT): CPT | Performed by: NURSE PRACTITIONER

## 2024-01-01 PROCEDURE — 82947 ASSAY GLUCOSE BLOOD QUANT: CPT

## 2024-01-01 PROCEDURE — 82330 ASSAY OF CALCIUM: CPT | Performed by: NURSE PRACTITIONER

## 2024-01-01 PROCEDURE — 84481 FREE ASSAY (FT-3): CPT | Performed by: NURSE PRACTITIONER

## 2024-01-01 PROCEDURE — 200N000002 HC R&B ICU UMMC

## 2024-01-01 PROCEDURE — 85347 COAGULATION TIME ACTIVATED: CPT

## 2024-01-01 PROCEDURE — 999N000077 HC STATISTIC INSERT IABP

## 2024-01-01 PROCEDURE — 84100 ASSAY OF PHOSPHORUS: CPT | Performed by: INTERNAL MEDICINE

## 2024-01-01 PROCEDURE — 85610 PROTHROMBIN TIME: CPT

## 2024-01-01 PROCEDURE — 84295 ASSAY OF SERUM SODIUM: CPT | Performed by: NURSE PRACTITIONER

## 2024-01-01 PROCEDURE — 99233 SBSQ HOSP IP/OBS HIGH 50: CPT | Mod: GC | Performed by: INTERNAL MEDICINE

## 2024-01-01 PROCEDURE — 33967 INSERT I-AORT PERCUT DEVICE: CPT | Performed by: INTERNAL MEDICINE

## 2024-01-01 PROCEDURE — 83051 HEMOGLOBIN PLASMA: CPT | Performed by: NURSE PRACTITIONER

## 2024-01-01 PROCEDURE — 258N000003 HC RX IP 258 OP 636

## 2024-01-01 PROCEDURE — 71045 X-RAY EXAM CHEST 1 VIEW: CPT

## 2024-01-01 PROCEDURE — 85379 FIBRIN DEGRADATION QUANT: CPT | Performed by: NURSE PRACTITIONER

## 2024-01-01 PROCEDURE — 93880 EXTRACRANIAL BILAT STUDY: CPT | Mod: 26 | Performed by: RADIOLOGY

## 2024-01-01 PROCEDURE — 99207 PR NO BILLABLE SERVICE THIS VISIT: CPT | Performed by: INTERNAL MEDICINE

## 2024-01-01 PROCEDURE — 33949 ECMO/ECLS DAILY MGMT ARTERY: CPT | Performed by: INTERNAL MEDICINE

## 2024-01-01 PROCEDURE — 36556 INSERT NON-TUNNEL CV CATH: CPT | Mod: XU | Performed by: INTERNAL MEDICINE

## 2024-01-01 PROCEDURE — 33947 ECMO/ECLS INITIATION ARTERY: CPT

## 2024-01-01 PROCEDURE — 99152 MOD SED SAME PHYS/QHP 5/>YRS: CPT | Performed by: INTERNAL MEDICINE

## 2024-01-01 PROCEDURE — 84484 ASSAY OF TROPONIN QUANT: CPT | Performed by: NURSE PRACTITIONER

## 2024-01-01 PROCEDURE — 99231 SBSQ HOSP IP/OBS SF/LOW 25: CPT | Performed by: PSYCHIATRY & NEUROLOGY

## 2024-01-01 PROCEDURE — 85730 THROMBOPLASTIN TIME PARTIAL: CPT

## 2024-01-01 PROCEDURE — 87040 BLOOD CULTURE FOR BACTERIA: CPT | Performed by: INTERNAL MEDICINE

## 2024-01-01 PROCEDURE — C8929 TTE W OR WO FOL WCON,DOPPLER: HCPCS

## 2024-01-01 PROCEDURE — 95718 EEG PHYS/QHP 2-12 HR W/VEEG: CPT | Performed by: PSYCHIATRY & NEUROLOGY

## 2024-01-01 PROCEDURE — 272N000555 HC SENSOR NIRS OXIMETER, ADULT

## 2024-01-01 PROCEDURE — 36556 INSERT NON-TUNNEL CV CATH: CPT | Performed by: INTERNAL MEDICINE

## 2024-01-01 PROCEDURE — 5A1945Z RESPIRATORY VENTILATION, 24-96 CONSECUTIVE HOURS: ICD-10-PCS | Performed by: NURSE PRACTITIONER

## 2024-01-01 PROCEDURE — 250N000011 HC RX IP 250 OP 636: Performed by: NURSE PRACTITIONER

## 2024-01-01 PROCEDURE — 74018 RADEX ABDOMEN 1 VIEW: CPT | Mod: 26 | Performed by: RADIOLOGY

## 2024-01-01 PROCEDURE — 99204 OFFICE O/P NEW MOD 45 MIN: CPT | Performed by: INTERNAL MEDICINE

## 2024-01-01 PROCEDURE — 999N000157 HC STATISTIC RCP TIME EA 10 MIN

## 2024-01-01 PROCEDURE — 99222 1ST HOSP IP/OBS MODERATE 55: CPT | Mod: GC | Performed by: INTERNAL MEDICINE

## 2024-01-01 PROCEDURE — 94003 VENT MGMT INPAT SUBQ DAY: CPT

## 2024-01-01 PROCEDURE — 250N000011 HC RX IP 250 OP 636

## 2024-01-01 PROCEDURE — 82805 BLOOD GASES W/O2 SATURATION: CPT | Performed by: STUDENT IN AN ORGANIZED HEALTH CARE EDUCATION/TRAINING PROGRAM

## 2024-01-01 PROCEDURE — 33952 ECMO/ECLS INSJ PRPH CANNULA: CPT | Mod: GC | Performed by: INTERNAL MEDICINE

## 2024-01-01 PROCEDURE — P9045 ALBUMIN (HUMAN), 5%, 250 ML: HCPCS | Performed by: STUDENT IN AN ORGANIZED HEALTH CARE EDUCATION/TRAINING PROGRAM

## 2024-01-01 PROCEDURE — 33949 ECMO/ECLS DAILY MGMT ARTERY: CPT

## 2024-01-01 PROCEDURE — 71045 X-RAY EXAM CHEST 1 VIEW: CPT | Mod: 26 | Performed by: RADIOLOGY

## 2024-01-01 PROCEDURE — 82550 ASSAY OF CK (CPK): CPT | Performed by: NURSE PRACTITIONER

## 2024-01-01 PROCEDURE — 85027 COMPLETE CBC AUTOMATED: CPT

## 2024-01-01 PROCEDURE — A9521 TC99M EXAMETAZIME: HCPCS | Performed by: INTERNAL MEDICINE

## 2024-01-01 PROCEDURE — 82533 TOTAL CORTISOL: CPT | Performed by: NURSE PRACTITIONER

## 2024-01-01 PROCEDURE — 87640 STAPH A DNA AMP PROBE: CPT | Performed by: NURSE PRACTITIONER

## 2024-01-01 PROCEDURE — 93454 CORONARY ARTERY ANGIO S&I: CPT | Performed by: INTERNAL MEDICINE

## 2024-01-01 PROCEDURE — 5A1522G EXTRACORPOREAL OXYGENATION, MEMBRANE, PERIPHERAL VENO-ARTERIAL: ICD-10-PCS | Performed by: INTERNAL MEDICINE

## 2024-01-01 PROCEDURE — 82140 ASSAY OF AMMONIA: CPT | Performed by: NURSE PRACTITIONER

## 2024-01-01 PROCEDURE — 5A2204Z RESTORATION OF CARDIAC RHYTHM, SINGLE: ICD-10-PCS | Performed by: INTERNAL MEDICINE

## 2024-01-01 PROCEDURE — 85014 HEMATOCRIT: CPT

## 2024-01-01 PROCEDURE — 272N000766 HC IAB CATH AND INSERTION KIT, SENSATION

## 2024-01-01 PROCEDURE — 99292 CRITICAL CARE ADDL 30 MIN: CPT | Mod: 25 | Performed by: INTERNAL MEDICINE

## 2024-01-01 PROCEDURE — 999N000075 HC STATISTIC IABP MONITORING

## 2024-01-01 PROCEDURE — 94002 VENT MGMT INPAT INIT DAY: CPT

## 2024-01-01 PROCEDURE — 80354 DRUG SCREENING FENTANYL: CPT | Performed by: NURSE PRACTITIONER

## 2024-01-01 PROCEDURE — 71250 CT THORAX DX C-: CPT

## 2024-01-01 PROCEDURE — 93880 EXTRACRANIAL BILAT STUDY: CPT

## 2024-01-01 PROCEDURE — 99152 MOD SED SAME PHYS/QHP 5/>YRS: CPT | Mod: GC | Performed by: INTERNAL MEDICINE

## 2024-01-01 PROCEDURE — 82247 BILIRUBIN TOTAL: CPT

## 2024-01-01 PROCEDURE — 93454 CORONARY ARTERY ANGIO S&I: CPT | Mod: 26 | Performed by: INTERNAL MEDICINE

## 2024-01-01 PROCEDURE — 84460 ALANINE AMINO (ALT) (SGPT): CPT

## 2024-01-01 PROCEDURE — 82550 ASSAY OF CK (CPK): CPT | Performed by: STUDENT IN AN ORGANIZED HEALTH CARE EDUCATION/TRAINING PROGRAM

## 2024-01-01 PROCEDURE — 999N000208 ECHOCARDIOGRAM COMPLETE

## 2024-01-01 PROCEDURE — 84100 ASSAY OF PHOSPHORUS: CPT | Performed by: NURSE PRACTITIONER

## 2024-01-01 PROCEDURE — 272N000053 HC CANNULA, ARTERIAL FEMORAL

## 2024-01-01 PROCEDURE — 250N000011 HC RX IP 250 OP 636: Performed by: STUDENT IN AN ORGANIZED HEALTH CARE EDUCATION/TRAINING PROGRAM

## 2024-01-01 PROCEDURE — C1751 CATH, INF, PER/CENT/MIDLINE: HCPCS | Performed by: INTERNAL MEDICINE

## 2024-01-01 PROCEDURE — 83605 ASSAY OF LACTIC ACID: CPT | Performed by: NURSE PRACTITIONER

## 2024-01-01 PROCEDURE — 36415 COLL VENOUS BLD VENIPUNCTURE: CPT | Performed by: INTERNAL MEDICINE

## 2024-01-01 PROCEDURE — 82805 BLOOD GASES W/O2 SATURATION: CPT | Performed by: NURSE PRACTITIONER

## 2024-01-01 PROCEDURE — 93306 TTE W/DOPPLER COMPLETE: CPT | Mod: 26 | Performed by: STUDENT IN AN ORGANIZED HEALTH CARE EDUCATION/TRAINING PROGRAM

## 2024-01-01 PROCEDURE — 272N000057 HC CATH BALLOON IABP

## 2024-01-01 PROCEDURE — 93925 LOWER EXTREMITY STUDY: CPT

## 2024-01-01 PROCEDURE — 82810 BLOOD GASES O2 SAT ONLY: CPT

## 2024-01-01 PROCEDURE — 74176 CT ABD & PELVIS W/O CONTRAST: CPT | Mod: 26 | Performed by: RADIOLOGY

## 2024-01-01 PROCEDURE — 80307 DRUG TEST PRSMV CHEM ANLYZR: CPT | Performed by: NURSE PRACTITIONER

## 2024-01-01 PROCEDURE — 95714 VEEG EA 12-26 HR UNMNTR: CPT

## 2024-01-01 PROCEDURE — 78606 BRAIN IMAGE W/FLOW 4 + VIEWS: CPT | Mod: 26 | Performed by: RADIOLOGY

## 2024-01-01 PROCEDURE — 87070 CULTURE OTHR SPECIMN AEROBIC: CPT | Performed by: NURSE PRACTITIONER

## 2024-01-01 PROCEDURE — 71250 CT THORAX DX C-: CPT | Mod: 26 | Performed by: RADIOLOGY

## 2024-01-01 PROCEDURE — 999N000065 XR CHEST PORT 1 VIEW

## 2024-01-01 PROCEDURE — 86900 BLOOD TYPING SEROLOGIC ABO: CPT | Performed by: NURSE PRACTITIONER

## 2024-01-01 PROCEDURE — 250N000011 HC RX IP 250 OP 636: Mod: JZ | Performed by: NURSE PRACTITIONER

## 2024-01-01 PROCEDURE — G0463 HOSPITAL OUTPT CLINIC VISIT: HCPCS | Mod: 25

## 2024-01-01 PROCEDURE — 272N000001 HC OR GENERAL SUPPLY STERILE: Performed by: INTERNAL MEDICINE

## 2024-01-01 PROCEDURE — 93925 LOWER EXTREMITY STUDY: CPT | Mod: 26 | Performed by: RADIOLOGY

## 2024-01-01 PROCEDURE — P9037 PLATE PHERES LEUKOREDU IRRAD: HCPCS | Performed by: NURSE PRACTITIONER

## 2024-01-01 PROCEDURE — 82805 BLOOD GASES W/O2 SATURATION: CPT | Performed by: INTERNAL MEDICINE

## 2024-01-01 PROCEDURE — 83036 HEMOGLOBIN GLYCOSYLATED A1C: CPT | Performed by: NURSE PRACTITIONER

## 2024-01-01 PROCEDURE — 999N000065 XR ABDOMEN PORT 1 VIEW

## 2024-01-01 PROCEDURE — 78606 BRAIN IMAGE W/FLOW 4 + VIEWS: CPT

## 2024-01-01 PROCEDURE — 95720 EEG PHY/QHP EA INCR W/VEEG: CPT | Performed by: PSYCHIATRY & NEUROLOGY

## 2024-01-01 PROCEDURE — 272N000232 HC CANNULA, VENOUS FEMORAL, ADULT

## 2024-01-01 PROCEDURE — 86481 TB AG RESPONSE T-CELL SUSP: CPT | Performed by: NURSE PRACTITIONER

## 2024-01-01 PROCEDURE — 99207 EEG VIDEO 12-26 HR UNMONITORED: CPT | Performed by: PSYCHIATRY & NEUROLOGY

## 2024-01-01 DEVICE — CATH CENTRAL LINE MULTI LUMEN 7FRX20CM CDC-45703-XP1A: Type: IMPLANTABLE DEVICE | Status: FUNCTIONAL

## 2024-01-01 RX ORDER — CHLORHEXIDINE GLUCONATE ORAL RINSE 1.2 MG/ML
15 SOLUTION DENTAL EVERY 12 HOURS
Status: DISCONTINUED | OUTPATIENT
Start: 2024-01-01 | End: 2024-08-02 | Stop reason: HOSPADM

## 2024-01-01 RX ORDER — LIDOCAINE 40 MG/G
CREAM TOPICAL
Status: CANCELLED | OUTPATIENT
Start: 2024-01-01

## 2024-01-01 RX ORDER — SODIUM CHLORIDE 9 MG/ML
INJECTION, SOLUTION INTRAVENOUS CONTINUOUS
Status: DISCONTINUED | OUTPATIENT
Start: 2024-01-01 | End: 2024-01-01

## 2024-01-01 RX ORDER — LIDOCAINE HYDROCHLORIDE ANHYDROUS AND DEXTROSE MONOHYDRATE .8; 5 G/100ML; G/100ML
INJECTION, SOLUTION INTRAVENOUS CONTINUOUS PRN
Status: COMPLETED | OUTPATIENT
Start: 2024-01-01 | End: 2024-01-01

## 2024-01-01 RX ORDER — MAGNESIUM OXIDE 400 MG/1
400 TABLET ORAL EVERY 4 HOURS
Status: COMPLETED | OUTPATIENT
Start: 2024-01-01 | End: 2024-01-01

## 2024-01-01 RX ORDER — ATROPINE SULFATE 0.1 MG/ML
0.5 INJECTION INTRAVENOUS
Status: ACTIVE | OUTPATIENT
Start: 2024-01-01 | End: 2024-01-01

## 2024-01-01 RX ORDER — NALOXONE HYDROCHLORIDE 0.4 MG/ML
0.4 INJECTION, SOLUTION INTRAMUSCULAR; INTRAVENOUS; SUBCUTANEOUS
Status: ACTIVE | OUTPATIENT
Start: 2024-01-01 | End: 2024-01-01

## 2024-01-01 RX ORDER — NALOXONE HYDROCHLORIDE 0.4 MG/ML
0.2 INJECTION, SOLUTION INTRAMUSCULAR; INTRAVENOUS; SUBCUTANEOUS
Status: ACTIVE | OUTPATIENT
Start: 2024-01-01 | End: 2024-01-01

## 2024-01-01 RX ORDER — GLYCOPYRROLATE 0.2 MG/ML
0.2 INJECTION, SOLUTION INTRAMUSCULAR; INTRAVENOUS ONCE
Status: DISCONTINUED | OUTPATIENT
Start: 2024-01-01 | End: 2024-08-02 | Stop reason: HOSPADM

## 2024-01-01 RX ORDER — POTASSIUM CHLORIDE 7.45 MG/ML
10 INJECTION INTRAVENOUS ONCE
Status: COMPLETED | OUTPATIENT
Start: 2024-01-01 | End: 2024-01-01

## 2024-01-01 RX ORDER — AMOXICILLIN 250 MG
1-2 CAPSULE ORAL 2 TIMES DAILY PRN
Status: DISCONTINUED | OUTPATIENT
Start: 2024-01-01 | End: 2024-08-02 | Stop reason: HOSPADM

## 2024-01-01 RX ORDER — OXYCODONE HYDROCHLORIDE 10 MG/1
10 TABLET ORAL EVERY 4 HOURS PRN
Status: DISCONTINUED | OUTPATIENT
Start: 2024-01-01 | End: 2024-08-02 | Stop reason: HOSPADM

## 2024-01-01 RX ORDER — HEPARIN SODIUM 10000 [USP'U]/100ML
10-50 INJECTION, SOLUTION INTRAVENOUS CONTINUOUS
Status: DISCONTINUED | OUTPATIENT
Start: 2024-01-01 | End: 2024-08-02 | Stop reason: HOSPADM

## 2024-01-01 RX ORDER — MAGNESIUM OXIDE 400 MG/1
400 TABLET ORAL EVERY 4 HOURS
Status: DISCONTINUED | OUTPATIENT
Start: 2024-01-01 | End: 2024-01-01

## 2024-01-01 RX ORDER — ASPIRIN 81 MG/1
81 TABLET, CHEWABLE ORAL DAILY
Status: DISCONTINUED | OUTPATIENT
Start: 2024-01-01 | End: 2024-01-01

## 2024-01-01 RX ORDER — LIDOCAINE HYDROCHLORIDE ANHYDROUS AND DEXTROSE MONOHYDRATE .8; 5 G/100ML; G/100ML
0-4 INJECTION, SOLUTION INTRAVENOUS CONTINUOUS
Status: DISCONTINUED | OUTPATIENT
Start: 2024-01-01 | End: 2024-01-01

## 2024-01-01 RX ORDER — DEXTROSE MONOHYDRATE 100 MG/ML
INJECTION, SOLUTION INTRAVENOUS CONTINUOUS PRN
Status: DISCONTINUED | OUTPATIENT
Start: 2024-01-01 | End: 2024-08-02 | Stop reason: HOSPADM

## 2024-01-01 RX ORDER — NALOXONE HYDROCHLORIDE 0.4 MG/ML
0.2 INJECTION, SOLUTION INTRAMUSCULAR; INTRAVENOUS; SUBCUTANEOUS
Status: DISCONTINUED | OUTPATIENT
Start: 2024-01-01 | End: 2024-08-02 | Stop reason: HOSPADM

## 2024-01-01 RX ORDER — LIDOCAINE 40 MG/G
CREAM TOPICAL
Status: DISCONTINUED | OUTPATIENT
Start: 2024-01-01 | End: 2024-08-02 | Stop reason: HOSPADM

## 2024-01-01 RX ORDER — POTASSIUM CHLORIDE 29.8 MG/ML
INJECTION INTRAVENOUS CONTINUOUS PRN
Status: COMPLETED | OUTPATIENT
Start: 2024-01-01 | End: 2024-01-01

## 2024-01-01 RX ORDER — ALBUTEROL SULFATE 90 UG/1
2 AEROSOL, METERED RESPIRATORY (INHALATION) EVERY 6 HOURS PRN
COMMUNITY

## 2024-01-01 RX ORDER — NICOTINE POLACRILEX 4 MG
15-30 LOZENGE BUCCAL
Status: DISCONTINUED | OUTPATIENT
Start: 2024-01-01 | End: 2024-08-02 | Stop reason: HOSPADM

## 2024-01-01 RX ORDER — OXYCODONE HYDROCHLORIDE 5 MG/1
5 TABLET ORAL EVERY 4 HOURS PRN
Status: DISCONTINUED | OUTPATIENT
Start: 2024-01-01 | End: 2024-08-02 | Stop reason: HOSPADM

## 2024-01-01 RX ORDER — GLYCOPYRROLATE 0.2 MG/ML
0.1 INJECTION, SOLUTION INTRAMUSCULAR; INTRAVENOUS EVERY 4 HOURS PRN
Status: DISCONTINUED | OUTPATIENT
Start: 2024-01-01 | End: 2024-08-02 | Stop reason: HOSPADM

## 2024-01-01 RX ORDER — POLYETHYLENE GLYCOL 3350 17 G/17G
17 POWDER, FOR SOLUTION ORAL DAILY PRN
Status: DISCONTINUED | OUTPATIENT
Start: 2024-01-01 | End: 2024-08-02 | Stop reason: HOSPADM

## 2024-01-01 RX ORDER — DEXTROSE MONOHYDRATE 25 G/50ML
25-50 INJECTION, SOLUTION INTRAVENOUS
Status: DISCONTINUED | OUTPATIENT
Start: 2024-01-01 | End: 2024-08-02 | Stop reason: HOSPADM

## 2024-01-01 RX ORDER — CEFTRIAXONE 2 G/1
2 INJECTION, POWDER, FOR SOLUTION INTRAMUSCULAR; INTRAVENOUS EVERY 24 HOURS
Status: DISCONTINUED | OUTPATIENT
Start: 2024-01-01 | End: 2024-01-01

## 2024-01-01 RX ORDER — HEPARIN SODIUM 10000 [USP'U]/100ML
10-50 INJECTION, SOLUTION INTRAVENOUS CONTINUOUS
Status: DISCONTINUED | OUTPATIENT
Start: 2024-01-01 | End: 2024-01-01

## 2024-01-01 RX ORDER — POTASSIUM PHOS IN 0.9 % NACL 15MMOL/250
15 PLASTIC BAG, INJECTION (ML) INTRAVENOUS
Status: COMPLETED | OUTPATIENT
Start: 2024-01-01 | End: 2024-01-01

## 2024-01-01 RX ORDER — SODIUM CHLORIDE 9 MG/ML
75 INJECTION, SOLUTION INTRAVENOUS CONTINUOUS
Status: ACTIVE | OUTPATIENT
Start: 2024-01-01 | End: 2024-01-01

## 2024-01-01 RX ORDER — SODIUM CHLORIDE, SODIUM LACTATE, POTASSIUM CHLORIDE, CALCIUM CHLORIDE 600; 310; 30; 20 MG/100ML; MG/100ML; MG/100ML; MG/100ML
INJECTION, SOLUTION INTRAVENOUS CONTINUOUS
Status: DISCONTINUED | OUTPATIENT
Start: 2024-01-01 | End: 2024-08-02 | Stop reason: HOSPADM

## 2024-01-01 RX ORDER — HEPARIN SODIUM 200 [USP'U]/100ML
3 INJECTION, SOLUTION INTRAVENOUS CONTINUOUS
Status: DISCONTINUED | OUTPATIENT
Start: 2024-01-01 | End: 2024-08-02 | Stop reason: HOSPADM

## 2024-01-01 RX ORDER — NOREPINEPHRINE BITARTRATE 0.06 MG/ML
INJECTION, SOLUTION INTRAVENOUS CONTINUOUS PRN
Status: COMPLETED | OUTPATIENT
Start: 2024-01-01 | End: 2024-01-01

## 2024-01-01 RX ORDER — NALOXONE HYDROCHLORIDE 0.4 MG/ML
0.4 INJECTION, SOLUTION INTRAMUSCULAR; INTRAVENOUS; SUBCUTANEOUS
Status: DISCONTINUED | OUTPATIENT
Start: 2024-01-01 | End: 2024-08-02 | Stop reason: HOSPADM

## 2024-01-01 RX ORDER — MAGNESIUM SULFATE HEPTAHYDRATE 40 MG/ML
2 INJECTION, SOLUTION INTRAVENOUS ONCE
Status: COMPLETED | OUTPATIENT
Start: 2024-01-01 | End: 2024-01-01

## 2024-01-01 RX ORDER — HEPARIN SODIUM 10000 [USP'U]/100ML
0-5000 INJECTION, SOLUTION INTRAVENOUS CONTINUOUS
Status: DISCONTINUED | OUTPATIENT
Start: 2024-01-01 | End: 2024-01-01

## 2024-01-01 RX ORDER — SODIUM CHLORIDE 9 MG/ML
INJECTION, SOLUTION INTRAVENOUS CONTINUOUS
Status: DISCONTINUED | OUTPATIENT
Start: 2024-01-01 | End: 2024-08-02 | Stop reason: HOSPADM

## 2024-01-01 RX ORDER — NOREPINEPHRINE BITARTRATE 0.06 MG/ML
.01-.6 INJECTION, SOLUTION INTRAVENOUS CONTINUOUS
Status: DISCONTINUED | OUTPATIENT
Start: 2024-01-01 | End: 2024-08-02 | Stop reason: HOSPADM

## 2024-01-01 RX ORDER — FENTANYL CITRATE 50 UG/ML
25 INJECTION, SOLUTION INTRAMUSCULAR; INTRAVENOUS
Status: DISCONTINUED | OUTPATIENT
Start: 2024-01-01 | End: 2024-08-02 | Stop reason: HOSPADM

## 2024-01-01 RX ORDER — FLUMAZENIL 0.1 MG/ML
0.2 INJECTION, SOLUTION INTRAVENOUS
Status: ACTIVE | OUTPATIENT
Start: 2024-01-01 | End: 2024-01-01

## 2024-01-01 RX ORDER — POTASSIUM CHLORIDE 29.8 MG/ML
20 INJECTION INTRAVENOUS
Status: COMPLETED | OUTPATIENT
Start: 2024-01-01 | End: 2024-01-01

## 2024-01-01 RX ORDER — MIDAZOLAM HCL IN 0.9 % NACL/PF 1 MG/ML
PLASTIC BAG, INJECTION (ML) INTRAVENOUS CONTINUOUS PRN
Status: DISCONTINUED | OUTPATIENT
Start: 2024-01-01 | End: 2024-01-01 | Stop reason: HOSPADM

## 2024-01-01 RX ORDER — VASOPRESSIN 20 U/ML
INJECTION PARENTERAL
Status: DISCONTINUED | OUTPATIENT
Start: 2024-01-01 | End: 2024-01-01 | Stop reason: HOSPADM

## 2024-01-01 RX ORDER — IOPAMIDOL 755 MG/ML
INJECTION, SOLUTION INTRAVASCULAR
Status: DISCONTINUED | OUTPATIENT
Start: 2024-01-01 | End: 2024-01-01 | Stop reason: HOSPADM

## 2024-01-01 RX ORDER — LIDOCAINE HYDROCHLORIDE ANHYDROUS AND DEXTROSE MONOHYDRATE .8; 5 G/100ML; G/100ML
1 INJECTION, SOLUTION INTRAVENOUS CONTINUOUS
Status: DISCONTINUED | OUTPATIENT
Start: 2024-01-01 | End: 2024-01-01

## 2024-01-01 RX ORDER — CARBOXYMETHYLCELLULOSE SODIUM 5 MG/ML
2 SOLUTION/ DROPS OPHTHALMIC
Status: DISCONTINUED | OUTPATIENT
Start: 2024-01-01 | End: 2024-08-02 | Stop reason: HOSPADM

## 2024-01-01 RX ORDER — FENTANYL CITRATE 50 UG/ML
INJECTION, SOLUTION INTRAMUSCULAR; INTRAVENOUS
Status: DISCONTINUED | OUTPATIENT
Start: 2024-01-01 | End: 2024-01-01 | Stop reason: HOSPADM

## 2024-01-01 RX ORDER — POTASSIUM CHLORIDE 750 MG/1
40 TABLET, EXTENDED RELEASE ORAL ONCE
Status: DISCONTINUED | OUTPATIENT
Start: 2024-01-01 | End: 2024-01-01

## 2024-01-01 RX ORDER — POTASSIUM CHLORIDE 1.5 G/1.58G
40 POWDER, FOR SOLUTION ORAL ONCE
Status: COMPLETED | OUTPATIENT
Start: 2024-01-01 | End: 2024-01-01

## 2024-01-01 RX ADMIN — VANCOMYCIN HYDROCHLORIDE 2000 MG: 1 INJECTION, POWDER, LYOPHILIZED, FOR SOLUTION INTRAVENOUS at 13:14

## 2024-01-01 RX ADMIN — HUMAN ALBUMIN MICROSPHERES AND PERFLUTREN 6 ML: 10; .22 INJECTION, SOLUTION INTRAVENOUS at 13:57

## 2024-01-01 RX ADMIN — Medication 3 UNITS/HR: at 07:52

## 2024-01-01 RX ADMIN — CEFTRIAXONE SODIUM 2 G: 2 INJECTION, POWDER, FOR SOLUTION INTRAMUSCULAR; INTRAVENOUS at 13:05

## 2024-01-01 RX ADMIN — CEFTRIAXONE SODIUM 2 G: 2 INJECTION, POWDER, FOR SOLUTION INTRAMUSCULAR; INTRAVENOUS at 11:59

## 2024-01-01 RX ADMIN — Medication 2 DROP: at 16:10

## 2024-01-01 RX ADMIN — CHLORHEXIDINE GLUCONATE 0.12% ORAL RINSE 15 ML: 1.2 LIQUID ORAL at 07:53

## 2024-01-01 RX ADMIN — POTASSIUM PHOSPHATE, MONOBASIC POTASSIUM PHOSPHATE, DIBASIC 15 MMOL: 224; 236 INJECTION, SOLUTION, CONCENTRATE INTRAVENOUS at 07:52

## 2024-01-01 RX ADMIN — CHLORHEXIDINE GLUCONATE 0.12% ORAL RINSE 15 ML: 1.2 LIQUID ORAL at 19:33

## 2024-01-01 RX ADMIN — PANTOPRAZOLE SODIUM 40 MG: 40 INJECTION, POWDER, FOR SOLUTION INTRAVENOUS at 07:52

## 2024-01-01 RX ADMIN — POTASSIUM CHLORIDE 40 MEQ: 1.5 POWDER, FOR SOLUTION ORAL at 19:33

## 2024-01-01 RX ADMIN — EPINEPHRINE 0.06 MCG/KG/MIN: 1 INJECTION INTRAMUSCULAR; INTRAVENOUS; SUBCUTANEOUS at 05:34

## 2024-01-01 RX ADMIN — CHLORHEXIDINE GLUCONATE 0.12% ORAL RINSE 15 ML: 1.2 LIQUID ORAL at 19:18

## 2024-01-01 RX ADMIN — POTASSIUM CHLORIDE 20 MEQ: 29.8 INJECTION, SOLUTION INTRAVENOUS at 08:53

## 2024-01-01 RX ADMIN — CHLORHEXIDINE GLUCONATE 0.12% ORAL RINSE 15 ML: 1.2 LIQUID ORAL at 07:43

## 2024-01-01 RX ADMIN — HEPARIN SODIUM 600 UNITS/HR: 10000 INJECTION, SOLUTION INTRAVENOUS at 13:36

## 2024-01-01 RX ADMIN — SODIUM CHLORIDE 1 MG/MIN: 9 INJECTION, SOLUTION INTRAVENOUS at 11:44

## 2024-01-01 RX ADMIN — PANTOPRAZOLE SODIUM 40 MG: 40 INJECTION, POWDER, FOR SOLUTION INTRAVENOUS at 07:43

## 2024-01-01 RX ADMIN — MAGNESIUM OXIDE TAB 400 MG (241.3 MG ELEMENTAL MG) 400 MG: 400 (241.3 MG) TAB at 02:52

## 2024-01-01 RX ADMIN — Medication 3 UNITS/HR: at 23:45

## 2024-01-01 RX ADMIN — SODIUM CHLORIDE, POTASSIUM CHLORIDE, SODIUM LACTATE AND CALCIUM CHLORIDE 500 ML: 600; 310; 30; 20 INJECTION, SOLUTION INTRAVENOUS at 05:28

## 2024-01-01 RX ADMIN — MAGNESIUM SULFATE HEPTAHYDRATE 2 G: 2 INJECTION, SOLUTION INTRAVENOUS at 18:01

## 2024-01-01 RX ADMIN — MAGNESIUM OXIDE TAB 400 MG (241.3 MG ELEMENTAL MG) 400 MG: 400 (241.3 MG) TAB at 06:08

## 2024-01-01 RX ADMIN — HEPARIN SODIUM 800 UNITS/HR: 10000 INJECTION, SOLUTION INTRAVENOUS at 07:34

## 2024-01-01 RX ADMIN — ALBUMIN HUMAN 50 G: 0.05 INJECTION, SOLUTION INTRAVENOUS at 05:38

## 2024-01-01 RX ADMIN — ASPIRIN 81 MG CHEWABLE TABLET 81 MG: 81 TABLET CHEWABLE at 07:43

## 2024-01-01 RX ADMIN — MAGNESIUM OXIDE TAB 400 MG (241.3 MG ELEMENTAL MG) 400 MG: 400 (241.3 MG) TAB at 09:51

## 2024-01-01 RX ADMIN — INSULIN HUMAN 2 UNITS/HR: 1 INJECTION, SOLUTION INTRAVENOUS at 11:18

## 2024-01-01 RX ADMIN — POTASSIUM CHLORIDE 20 MEQ: 29.8 INJECTION, SOLUTION INTRAVENOUS at 18:01

## 2024-01-01 RX ADMIN — SODIUM CHLORIDE, POTASSIUM CHLORIDE, SODIUM LACTATE AND CALCIUM CHLORIDE: 600; 310; 30; 20 INJECTION, SOLUTION INTRAVENOUS at 01:59

## 2024-01-01 RX ADMIN — Medication 2.4 UNITS/HR: at 21:05

## 2024-01-01 RX ADMIN — SODIUM CHLORIDE, POTASSIUM CHLORIDE, SODIUM LACTATE AND CALCIUM CHLORIDE 500 ML: 600; 310; 30; 20 INJECTION, SOLUTION INTRAVENOUS at 03:37

## 2024-01-01 RX ADMIN — POTASSIUM CHLORIDE 20 MEQ: 29.8 INJECTION, SOLUTION INTRAVENOUS at 18:38

## 2024-01-01 RX ADMIN — ASPIRIN 81 MG CHEWABLE TABLET 81 MG: 81 TABLET CHEWABLE at 07:53

## 2024-01-01 RX ADMIN — POTASSIUM CHLORIDE 20 MEQ: 29.8 INJECTION, SOLUTION INTRAVENOUS at 14:23

## 2024-01-01 RX ADMIN — AMIODARONE HYDROCHLORIDE 0.5 MG/MIN: 50 INJECTION, SOLUTION INTRAVENOUS at 12:11

## 2024-01-01 RX ADMIN — Medication 1 UNITS/HR: at 11:41

## 2024-01-01 RX ADMIN — HEPARIN SODIUM 3 ML/HR: 200 INJECTION, SOLUTION INTRAVENOUS at 09:31

## 2024-01-01 RX ADMIN — POTASSIUM CHLORIDE 20 MEQ: 29.8 INJECTION, SOLUTION INTRAVENOUS at 15:43

## 2024-01-01 RX ADMIN — SODIUM CHLORIDE, POTASSIUM CHLORIDE, SODIUM LACTATE AND CALCIUM CHLORIDE: 600; 310; 30; 20 INJECTION, SOLUTION INTRAVENOUS at 16:27

## 2024-01-01 RX ADMIN — LIDOCAINE HYDROCHLORIDE 1 MG/MIN: 8 INJECTION, SOLUTION INTRAVENOUS at 09:00

## 2024-01-01 RX ADMIN — CALCIUM CHLORIDE 1 G: 100 INJECTION, SOLUTION INTRAVENOUS at 23:02

## 2024-01-01 RX ADMIN — NOREPINEPHRINE BITARTRATE 0.06 MCG/KG/MIN: 0.06 INJECTION, SOLUTION INTRAVENOUS at 19:16

## 2024-01-01 RX ADMIN — CEFTRIAXONE SODIUM 2 G: 2 INJECTION, POWDER, FOR SOLUTION INTRAMUSCULAR; INTRAVENOUS at 12:10

## 2024-01-01 RX ADMIN — SODIUM CHLORIDE 1 MG/MIN: 9 INJECTION, SOLUTION INTRAVENOUS at 08:09

## 2024-01-01 RX ADMIN — Medication 3 UNITS/HR: at 10:35

## 2024-01-01 RX ADMIN — POTASSIUM PHOSPHATE, MONOBASIC POTASSIUM PHOSPHATE, DIBASIC 15 MMOL: 224; 236 INJECTION, SOLUTION, CONCENTRATE INTRAVENOUS at 06:20

## 2024-01-01 RX ADMIN — CHLORHEXIDINE GLUCONATE 0.12% ORAL RINSE 15 ML: 1.2 LIQUID ORAL at 19:47

## 2024-01-01 RX ADMIN — MAGNESIUM OXIDE TAB 400 MG (241.3 MG ELEMENTAL MG) 400 MG: 400 (241.3 MG) TAB at 23:44

## 2024-01-01 RX ADMIN — CHLORHEXIDINE GLUCONATE 0.12% ORAL RINSE 15 ML: 1.2 LIQUID ORAL at 11:59

## 2024-01-01 RX ADMIN — EPINEPHRINE 0.05 MCG/KG/MIN: 1 INJECTION INTRAMUSCULAR; INTRAVENOUS; SUBCUTANEOUS at 11:42

## 2024-01-01 RX ADMIN — POTASSIUM CHLORIDE 20 MEQ: 29.8 INJECTION, SOLUTION INTRAVENOUS at 19:33

## 2024-01-01 RX ADMIN — Medication 3 UNITS/HR: at 22:44

## 2024-01-01 RX ADMIN — EXAMETAZIME 24.8 MILLICURIE: KIT at 14:38

## 2024-01-01 RX ADMIN — POTASSIUM CHLORIDE 20 MEQ: 29.8 INJECTION, SOLUTION INTRAVENOUS at 13:40

## 2024-01-01 RX ADMIN — PANTOPRAZOLE SODIUM 40 MG: 40 INJECTION, POWDER, FOR SOLUTION INTRAVENOUS at 11:59

## 2024-01-01 RX ADMIN — POTASSIUM CHLORIDE 10 MEQ: 7.46 INJECTION, SOLUTION INTRAVENOUS at 05:20

## 2024-01-01 ASSESSMENT — ACTIVITIES OF DAILY LIVING (ADL)
ADLS_ACUITY_SCORE: 55
ADLS_ACUITY_SCORE: 57
ADLS_ACUITY_SCORE: 57
ADLS_ACUITY_SCORE: 55
ADLS_ACUITY_SCORE: 57
ADLS_ACUITY_SCORE: 55
ADLS_ACUITY_SCORE: 57
ADLS_ACUITY_SCORE: 57
ADLS_ACUITY_SCORE: 35
ADLS_ACUITY_SCORE: 55
ADLS_ACUITY_SCORE: 57
ADLS_ACUITY_SCORE: 35
ADLS_ACUITY_SCORE: 57
ADLS_ACUITY_SCORE: 57
ADLS_ACUITY_SCORE: 35
ADLS_ACUITY_SCORE: 55
ADLS_ACUITY_SCORE: 57
ADLS_ACUITY_SCORE: 55
ADLS_ACUITY_SCORE: 47
ADLS_ACUITY_SCORE: 55
ADLS_ACUITY_SCORE: 57
ADLS_ACUITY_SCORE: 57
ADLS_ACUITY_SCORE: 55
ADLS_ACUITY_SCORE: 57
ADLS_ACUITY_SCORE: 55
ADLS_ACUITY_SCORE: 57
ADLS_ACUITY_SCORE: 55
ADLS_ACUITY_SCORE: 57
ADLS_ACUITY_SCORE: 55
ADLS_ACUITY_SCORE: 57
ADLS_ACUITY_SCORE: 57
ADLS_ACUITY_SCORE: 55
ADLS_ACUITY_SCORE: 57
ADLS_ACUITY_SCORE: 55
ADLS_ACUITY_SCORE: 55
ADLS_ACUITY_SCORE: 57
ADLS_ACUITY_SCORE: 35
ADLS_ACUITY_SCORE: 57
ADLS_ACUITY_SCORE: 55
ADLS_ACUITY_SCORE: 35
DEPENDENT_IADLS:: INDEPENDENT
ADLS_ACUITY_SCORE: 55
ADLS_ACUITY_SCORE: 35
ADLS_ACUITY_SCORE: 55
ADLS_ACUITY_SCORE: 35

## 2024-03-08 NOTE — PROGRESS NOTES
CCx: Care for left lower lobe pneumonia    HPI:Mr. Solorzano is a 58 year old gentleman with a past medical history significant for hypertension, hyperlipidemia, CAD status post PCI, tobacco abuse who was recently hospitalized at [facility] on 2/16 for an NSTEMI.  During that same hospitalization, he was noted to have left lower lobe pneumonia with concerns for left infrahilar mass.  Since his discharge from the hospital he states that he has been doing well and denies chest pain, chest tightness or shortness of breath.  He has no difficulty coughing up secretions, has no episodes of aspiration, no fevers, chills, night sweats or change in his weight.  Of note he does wear dentures and has a muffled voice.    ROS:  Reviewed.     PMH:  CAD status post PCI  Recent NSTEMI  HTN  Hyperlipidemia  Tobacco abuse    Allergies:  NKDA.    Family HX:  Reviewed.     Social Hx:  Marital status: , originally from [place]  Number of children: 5  Resides in a house, no concern for mold.  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    service: No  Pets: 1 small dog  Smoking history: 35+ pack year history; has not smoked in the last month  Alcohol use: Stopped drinking for a few years   Recreational drug use: No  Hobbies: No  Recent Travel: No    Current Meds:  Current Outpatient Medications   Medication Sig Dispense Refill    albuterol (PROAIR HFA/PROVENTIL HFA/VENTOLIN HFA) 108 (90 Base) MCG/ACT inhaler Inhale 2 puffs into the lungs every 6 hours as needed for shortness of breath, wheezing or cough      mometasone (ASMANEX TWISTHALER) 220 MCG/ACT inhaler Inhale 1 puff into the lungs every evening Unsure of dose      amLODIPine (NORVASC) 5 MG tablet Take 1 tablet by mouth daily      ASPIRIN LOW DOSE 81 MG EC tablet Take 1 tablet by mouth daily      atorvastatin (LIPITOR) 80 MG tablet Take 1 tablet by mouth daily      FLUoxetine (PROZAC) 40 MG capsule Take 1 capsule by mouth  daily      lisinopril (ZESTRIL) 20 MG tablet Take 1 tablet by mouth daily      metFORMIN (GLUCOPHAGE XR) 500 MG 24 hr tablet Take 1 tablet by mouth daily      metoprolol succinate ER (TOPROL XL) 50 MG 24 hr tablet Take 1 tablet by mouth daily      nitroGLYcerin (NITROSTAT) 0.4 MG sublingual tablet DISSOLVE 1 PILL UNDER TONGUE EVERY 5 MINUTES AS NEEDED FOR CHEST PAIN/YOG MOB HAUV SIAB MUAB IB LUB TSHUAJ  HAUV QAB NPLAIG TXHUA 5 JODI THIS      traZODone (DESYREL) 50 MG tablet Take 1 tablet by mouth every other day       Labs:  Reviewed.     Imaging studies:  CT Chest w/o IV Contrast 2/21/24:  1.  Left lower lobe broncholith. Redemonstrated bronchial wall thickening and mucous plugging, greatest in the left lower lobe, slightly improved since prior CT. Persistent left lower lobe pneumonia with slightly improved surrounding tree-in-bud nodularity. Recommend pulmonary consultation.   2.  Borderline aneurysmal dilatation of the ascending thoracic aorta measuring up to 4.0 cm.     CT Chest w/o Contrast 2/17/24:  1.  Localized airspace disease with associated endobronchial filling most suggestive of left lower lobe pneumonia. However, a central left infrahilar mass with postobstructive pneumonitis also possible.   2.  Central bronchi of left lower lobe demonstrates soft tissue filling/occlusion, its possible bronchoscopic evaluation may be beneficial for therapy and diagnosis. Otherwise suggest treatment with short-term follow-up CT to confirm resolution.   3.  Cardiomegaly.     TTE 2/17/24:    1. Mild left ventricular chamber enlargement with regional wall motion abnormalities (in a proximal to distal left anterior descending coronary artery distribution), including subtle aneurysmal/dyskinetic motion of the apex and partial mid-cavity.     2. Mild-moderately reduced left ventricular ejection fraction (41%).     3. No left ventricular chamber thrombus.     4. Elevated left ventricular filling pressure.     5. Normal  "right ventricular chamber size and systolic function.     6. Elevated estimated peak right ventricular/pulmonary artery systolic pressure (37 mmHg).     7. Mild-moderate presumed secondary aortic valve regurgitation with a dilated aortic root (to 42 mm) and ascending aorta (to 41 mm).     8. No other significant valvular heart disease.     9. No pericardial effusion.     10. Enlarged inferior vena cava with normal inspiratory collapse consistent with high-normal central venous pressures.     11. On [id number] comparison with the most recent study (dated 04/01/2016), left ventricular ejection fraction has decreased further (from approximately 45-50%), regional wall motion abnormalities are more extensive and prominent, and aortic   valve regurgitation and thoracic aorta dimensions have increased.     Physical Exam:  /68 (BP Location: Left arm, Patient Position: Chair, Cuff Size: Adult Large)   Pulse 58   Ht 1.575 m (5' 2\")   Wt 92.1 kg (203 lb)   SpO2 99%   BMI 37.13 kg/m    GEN: NAD  HEENT: PERRL, No JVD  LUNGS: CTA BL  CVS: S1 & S2 no added sounds  ABD: No HSM, BS audible  EXT: No edema, no rashes  NEURO: AO*3 CN2-12 intact    Assessment and Plan:Elias Solorzano is a 58 year old with a past medical history significant for  hypertension, hyperlipidemia, CAD status post PCI, tobacco abuse who presents to clinic today for evaluation of a left lower lobe pneumonia that was noted on admission to [facility] last month. For the present we would recommend;    Unresolving left lower lobe infiltrate: This abnormality was initially noted at the time of admission at [facility] and redemonstrated on repeat imaging that was done within 5 days of the prior imaging study.  He has been treated with ceftriaxone and azithromycin while in the hospital and discharged on a short-term course of cefdinir.  It is less likely that the persistent left lower lobe abnormality has occurred due to failure of treatment " especially given how nontoxic he appears.  I suspect the imaging was repeated too soon from initial presentation.  Given his history of tobacco abuse it is reasonable to perform a bronchoscopy with a bronchoalveolar lavage especially given the degree of left lower lobe collapse.  Repeat CT scan of the chest in 6 weeks from the last CT.  Follow-up: 6 weeks.  He will have undergone his bronchoscopy with lavage and a CT scan of his chest prior to his next follow-up visit with me.      Josie GALINDO Reilly  Pulmonary and Critical Care  [phone]

## 2024-03-12 NOTE — TELEPHONE ENCOUNTER
Called son to go over bronchscopy education. Informed son that procedure is scheduled for 3/20/24 at 10:00 am at [facility].  Instructed to arrive at 8:30am.  Discussed that an IV will be placed and IV sedation will be given.  Instructed to have nothing to eat after midnight. Ok to have clear liquids until 6:30am.    Medication instructions: take medications in the morning with a sip of water. Hold metformin for 24 hours.  Stop Aspirin, Ibuprofen, NSAIDS, coxibs (ie:celebrex)date:  n/a  Stop blood thinner date: n/a    Son understands that they will need a ride home and someone to stay with them after the procedure.  Patient had no further questions and is agreeable to procedure.  Phone number given for questions.    Marisol Contreras RN  Pulmonary Specialty Procedures  [facility]  [phone]

## 2024-03-19 NOTE — TELEPHONE ENCOUNTER
Updated patient that bronchoscopy is cancelled for tomorrow and he is to follow up with with TB clinic. Called and confirmed with [facility] clinic that they received referral and will be contacting pt to schedule.     HEYDI GUERRIER RN   [facility]

## 2024-04-03 PROBLEM — I35.1 NONRHEUMATIC AORTIC VALVE INSUFFICIENCY: Status: ACTIVE | Noted: 2024-01-01

## 2024-04-03 PROBLEM — I48.20 CHRONIC ATRIAL FIBRILLATION (H): Status: ACTIVE | Noted: 2024-01-01

## 2024-04-03 PROBLEM — I77.810 DILATED AORTIC ROOT (H): Status: ACTIVE | Noted: 2024-01-01

## 2024-04-03 PROBLEM — I25.10 CORONARY ARTERY DISEASE INVOLVING NATIVE CORONARY ARTERY OF NATIVE HEART WITHOUT ANGINA PECTORIS: Status: ACTIVE | Noted: 2024-01-01

## 2024-04-03 PROBLEM — I25.5 ISCHEMIC CARDIOMYOPATHY: Status: ACTIVE | Noted: 2024-01-01

## 2024-07-30 PROBLEM — I46.9 CARDIAC ARREST (H): Status: ACTIVE | Noted: 2024-01-01

## 2024-07-30 NOTE — LETTER
[facility], located within [facility] ADULT CV ICU  [address] [address]  Phone: [phone]    August 2, 2024        Elias Solorzano  [address] [place] [address]        To whom it may concern:    RE: Elias Solorzano was here at the [facility] visiting and taking care of his father who ultimately passed away. Please excuse his absence from work from during the time period that Mr Solorzano was in the hospital 07/30 through 08/02 plus the associated travel time.        Please contact me for questions or concerns.      Sincerely,    SHELBI Howe, CNP  [facility]  Critical Care Cardiology  [phone]

## 2024-07-30 NOTE — PROGRESS NOTES
ECMO Attending Progress Note  2024    Elias Solorzano is a 59 year old male who was cannulated for ECMO 2024 due to refractory VF arrest    Cannulation Site:  17 Fr in the R femoral artery  25 Fr in the R femoral vein    Interval events: cannulated for hemodynamic stability, shocked out of VF in the cath lab    Pulsatilty (IABP paused if applicable):10 mmHG     Physical Exam:  Pulse:  [103] 103  FiO2 (%):  [60 %] 60 %  SpO2:  [100 %] 100 %    Intake/Output Summary (Last 24 hours) at 2024 1226  Last data filed at 2024 1200  Gross per 24 hour   Intake 1117 ml   Output --   Net 1117 ml    Vent Mode: CMV/AC  FiO2 (%): 60 %  Resp Rate (Set): 10 breaths/min  Tidal Volume (Set, mL): (S) 450 mL  PEEP (cm H2O): (S) 7 cmH2O    Labs:  Recent Labs   Lab 24  0939 24  0924  07524  0741   PH 7.33*  --  7.32* 7.26*   PCO2 36  --  43 39   PO2 87  --  135* 544*   HCO3 19*  --  22 18*   O2PER 60 60  50  60 50.0 80.0      Recent Labs   Lab 24  0923 24  07524  0741   WBC 12.8*  --   --    HGB 16.0 14.6 15.3     Creatinine   Date Value Ref Range Status   2024 1.53 (H) 0.67 - 1.17 mg/dL Final     Blood Flow (Circuit) LPM: 3.89 LPM  Sweep LPM: (S) 2.5 LPM  Sweep FiO2   %: 50 %  ACT  (seconds): 241 seconds  Pulse Oximetry  (SpO2%): 96 %  Arterial Pressure  mmH mmHg    ECMO Issues including assessments and plan on DOS 2024:  Neuro: Sedated for mechanical ventilation and ECMO.  No acute distress.  NIRS stable b/l  RASS goal: -3.  Pupils fixed and dilated  CV: Cardiogenic shock.  Hemodynamically stable on low dose epi and vaso  Pulm: Keep vent settings at rest settings as above.  FEN/Renal: Electrolytes stable w/ replacement protocols in place, Cr stable, UOP stable  Heme: ACT goal: 180-200, Hemoglobin stable .  Minimal oozing around the ECMO cannulas.  ID: Receiving empiric antibiotics  Cannulae: Position is acceptable on exam and the available imaging.  Distal  perfusion cannula is in place and patent.  Extremities are well-perfused.     I have personally reviewed the ECMO flows, oxygenation and CO2 clearance, anticoagulation, and cannula position.  I have also personally assessed the patient's systemic response with hemodynamics, oxygenation, ventilation, and bleeding.       The patient requires continued ECMO support and management in the ICU.  I have discussed patient care and treatment plan with the primary team.      Omar Gilman MD, PhD  Interventional/Critical Care Cardiology  [phone]    July 30, 2024

## 2024-07-30 NOTE — PROGRESS NOTES
[facility]    ECLS Admission Note:     Date Arrived: 7/30/2024  Time Arrived: 0730    Cannulated at: [facility]  Cannulation Date: 7/30/24  Cannulation Time: 0608    Pre-cannulation ABG: NA    Arterial Cannula:17 Fr. In the Right Femoral Artery  Venous Cannula: 25 Fr. In the Right Femoral Vein  Distal Perfusion Catheter: 8 Fr. In the Right Superficial Femoral Artery    ECMO components include:  Console Serial Number: [id number]  Circuit Lot Number: [id number]  Oxygenator Lot Number: [id number]    Patient transported to [facility] by EMS and Mobile team.  Met patient in CCL and transitioned to  without incident. Cannula placement was verified by fluoroscopy, cannula position is good.    Patient's blood type is pending.    Treasure Hendrix, RT  ECMO Specialist  7/30/2024 7:53 AM

## 2024-07-30 NOTE — H&P
Cardiology ICU H&P Note    H&P:  Elias Solorzano is a 59 year old  male being admitted to the CICU on 07/30/2024 s/p refractory VF cardiac arrest requiring VA ECMO, patient was cannulated by mobile at [facility] ([facility] [place] The patient has an unknown past medical hx.     Witnessed arest (Y/N): N  Home or public location (Y/N): home  Bystander CPR (Y/N): N  Time to compressions: 5-10 mins estimated  Time to cannulation: 61 mins (0508 dispatch time, 0609 cannulation)  Initial rhythm: VF  Did they have intermittent ROSC (Y/N): N  # of shocks: 4  Epi: 3 mg  Amio: 450 mg    Mod disease, no culprit lesion, no stents. Added amiodarone and lidocaine infusions.     IABP placed in CCL here at the .     Subjective and Interval:  -admit to CICU for post resuscitation care    Assessment and plan by system:  ==Today's changes ==  -add blood cultures   -increase ACT goal to 180-200    == Neurology ==   #Concern for anoxic brain injury    -Intubated, sedated. Avoiding hyperthermia  -continuous vEEG     CT head shows Findings consistent with diffuse hypoxic ischemic injury.  No transtentorial herniation at this time.     Current sedation:  RASS ([place] Agitation-Sedation Scale): -5-->unarousable    Plan:  -Continue sedation with a RASS goal -2 to 3  -Spontaneous awakening trial as tolerated  -Permissive hypercapnea and hypernatremia for neuroprotection    == Cardiovascular ==  #Refractory VF Cardiac arrest   #Shock requiring VA ECMO  #CAD  #Cardiomyopathy  #Dyslipidemia  Peripheral V-A ECMO inserted via LCFA and LCFV   Angiogram:   2V CAD with 40% ISR of proximal LAD stent which tapers down to a small caliber distal LAD with moderate disease; 30% ISR of proximal D1 stent; proximal Lcx with mild disease with a large caliber OM2 without significant disease, small caliber distal Lcx with moderate disease; dominant RCA with moderate disease in mid segment and 50% ISR of proximal rPDA stent at the bifurcation with PL.  No culprit lesion  for VT/VF identified.   Uncomplicated L femoral access.  TTE: pending     ECMO :  Mode: V-A   Pump Type: Cardiohelp  RPM's: 3880  Blood Flow (Circuit) LPM: 3.89 LPM  Sweep LPM: (S) 2.5 LPM  Sweep FiO2   %: 50 %  Venous Pressure  mmHg: -83 mmHg  Arterial Pressure  mmH mmHg  Internal Pressure mmH mmHg  Pressure Change mmH mmHg    Current Pressors/inotropes/antiarrythmic:  Dose (mcg/kg/min) Norepinephrine: 0.4 mcg/kg/min, Dose (units/hr) Vasopressin: 4 Units/hr, and Dose (mcg/kg/min) Epinephrine: 0.1 mcg/kg/min    Hemodynamics:       Invasive Hemodynamic Monitoring:                            Plan:  -Continue ASA 81mg and ticagrelor 90mg BID   -Hold lipitor for now given shock liver  -Hold ACE/ARB for now given likely reduced renal fxn after arrest  -Hold beta blocker given shock  -Telemetry monitoring  -Trend troponin   -Continue ECMO support  -Continue IABP  -FBG even, hold diuresis    == Pulmonary ==  #Acute hypoxemic respiratory failure requiring intubation  #Probable aspiration pneumonia  #rib fractures  Vent Settings:   Vent Mode: CMV/AC  FiO2 (%): 60 %  Resp Rate (Set): 10 breaths/min  Tidal Volume (Set, mL): (S) 450 mL  PEEP (cm H2O): (S) 7 cmH2O    ABG:   Recent Labs   Lab 24  0939 24  0923 24  0758 24  0741   PH 7.33*  --  7.32* 7.26*   PCO2 36  --  43 39   PO2 87  --  135* 544*   HCO3 19*  --  22 18*   O2PER 60 60  50  60 50.0 80.0     CXR: pending  CT CAP no evidence of bleeding, cannula in stable position, ETT in appropriate  position.     Plan:  -Wean vent as able  -Daily CXR  -Serial ABGs   -Consider scheduled duonebs if signs of lung dz, currently PRN    -Peridex  -FBG and diuresis as above        == Gastrointestinal, Nutrition ==  #Concern for Shock liver 2/2 cardiac arrest  #Hypoalbuminemia   No known medical hx.     CT CAP above  Recent Labs   Lab 24  0923   AST 1,527*   *   BILITOTAL 1.5*   ALBUMIN 3.4*   PROTTOTAL 5.8*   ALKPHOS 73     Diet  NPO in immediate post arrest setting    Plan:  -Monitor LFTs  -Bowel regimen in place  -GI Prophylaxis: PPI; Protonix 40 mg daily     == Renal, Electrolytes ==  #Concern for Acute Renal Injury 2/2 cardiac arrest  #Lactic acidosis    No intake or output data in the 24 hours ending 07/30/24 0803  Recent Labs   Lab 07/30/24  0923 07/30/24  0758 07/30/24  0741   * 149* 147*   POTASSIUM 3.3* 4.2 3.4   CHLORIDE 106  --   --    CO2 18*  --   --    BUN 15.9  --   --    CR 1.53*  --   --    MAG 2.7*  --   --      Plan:  -Avoid nephrotoxins, renally dose meds  -Monitor urine output  -FBG and diuresis as above    == Infectious Disease ==  #Aspiration Pneumonia- in the setting of arrest.  CXR/CAP as above.   #Leukocytosis  Recent Labs   Lab 07/30/24 0923   WBC 12.8*     No data recorded.    Cultures thus far:  Pending     Antibiotics  Anti-infectives (From now, onward)      Start     Dose/Rate Route Frequency Ordered Stop    07/30/24 1000  cefTRIAXone (ROCEPHIN) 2 g vial to attach to  ml bag for ADULTS or NS 50 ml bag for PEDS         2 g  over 30 Minutes Intravenous EVERY 24 HOURS 07/30/24 0852 08/04/24 0959                       Plan:  -Continue CTX x 5D for aspiration coverage  -Monitor for signs of infection  -Avoid fevers     == Hematology ==  # Anemia of critical illness  Recent Labs   Lab 07/30/24  0923 07/30/24  0758 07/30/24  0741   HGB 16.0 14.6 15.3   HCT 44.9  --   --      --   --      No lab results found in last 7 days.  Hgb stable. No e/o bleeding  Receiving heparin for ECMO with ACT goal 140-160     ACT  (seconds): 241 seconds  US LE w/ arterial duplex pending  DVT PPX: Heparin as above    Plan:  -Continue Heparin with ACT goal as above  -Transfusion parameters:   -1u PRBC for hbg < 7.0   -1u platelet for plt < 50   -1u FFP for INR > 3   -5u cryoprecipitate for fibrinogen <150     == Endocrinology ==  #Hyperglycemia   Hgb a1c   Recent Labs   Lab 07/30/24 0923   A1C 5.5     Recent Labs   Lab  07/30/24  1106 07/30/24  0928 07/30/24  0923 07/30/24  0758 07/30/24  0741   * 200* 228* 193* 200*   Dose (units/hr) Insulin: 2 Units/hr    Plan  -Insulin infusion as needed    == Integumentary ==  -No skin issues  -CTM cannula sites    == Lines/Tubes/Drains ==  Arterial Line 07/30/24 Wrist (Active)   Site Assessment WDL 07/30/24 0850   Line Status Pulsatile blood flow 07/30/24 0850   Art Line Waveform Appropriate 07/30/24 0850   Art Line Interventions Zeroed and calibrated 07/30/24 0850   Number of days: 0       CVC Triple Lumen Left Femoral (Active)   Number of days:        ECMO Cannula Arterial Right femoral artery (Active)   Site Assessment WDL;Sutured;Secure 07/30/24 1045   Skin Assessment Clean;Dry;Intact 07/30/24 1045   Dressing Type Silverlon;Ioban 07/30/24 1045   Dressing Status clean;dry;intact 07/30/24 1045   Site Intervention No intervention needed 07/30/24 1045   Number of days:        ECMO Cannula Venous Right femoral vein (Active)   Site Assessment WDL;Sutured;Secure 07/30/24 1045   Skin Assessment Clean;Dry;Intact 07/30/24 1045   Dressing Type Silverlon;Ioban 07/30/24 1045   Dressing Status clean;dry;intact 07/30/24 1045   Site Intervention No intervention needed 07/30/24 1045   Number of days:        Distal Perfusion Catheter Right superficial femoral artery (Active)   Site Assessment WDL;Sutured;Secure 07/30/24 1045   Dressing Type Ioban 07/30/24 1045   Dressing Status clean;dry;intact 07/30/24 1045   Site Intervention No intervention needed 07/30/24 1045   Number of days:        Urethral Catheter 07/30/24 16 fr (Active)   Number of days: 0     ICU Checklist  Feeding: hold  Analgesia: hold  Sedation: hold   Thrombopx: heparin infusion  Head of bed: reverse trend  Ulcers: PPI  Glucose: insulin infusion  SBT: no  Bowel regimen: ordered  Indwelling cath: yes, needed  De-escalate meds:   Code Status: Full Code , VA ECMO candidate   Family updated by team. Patient seen and discussed with staff  "physician, Dr. Omar Gilman.    Сергей Wolf, APRN, CNP  Critical Care Cardiology  Securely message with the [employer] Web Console (learn more here)    Objective:  Most recent vital signs:  Pulse 103   Ht 1.575 m (5' 2\")   Wt 97.5 kg (214 lb 15.2 oz)   SpO2 100%   BMI 39.31 kg/m    Pulse:  [103] 103  FiO2 (%):  [60 %] 60 %  SpO2:  [100 %] 100 %    Physical exam:  General: critically ill, supine in bed, in NAD  HEENT: Pupils fixed by pupillometer no scleral icterus or injection  CARDIAC: RRR, no m/r/g appreciated. Peripheral pulses dopplered  RESP: synchronous with mechanical ventilation, CTAB, no wheezes, rhonchi or crackles appreciated.  GI: soft, non-distended, BS hypoactive  : Urinary catheter present  EXTREMITIES: NO LE edema, pulses 2+. Femoral access site w/o bleeding, dressing C/D/I.   SKIN: No acute lesions appreciated on exposed skin.  NEURO: Intubated and sedated. Unable to assess language or mentation. Unresponsive to noxious stim x 4 extremities.     Imaging/procedure results:  CT Chest Abdomen Pelvis w/o Contrast  Narrative: EXAMINATION: CT CHEST ABDOMEN PELVIS W/O CONTRAST, 7/30/2024 9:31 AM    TECHNIQUE:  Helical CT images from the thoracic inlet through the  symphysis pubis were obtained without IV contrast.    COMPARISON: CT 2/21/2024    HISTORY: Cardiac Arrest: concern for pulmonary edema and possible  abdominal bleeding post procedure    FINDINGS:  LINES/TUBES/DRAINS: Right femoral AV ECMO cannulation with arterial  cannula tip at the abdominal aortic bifurcation and venous cannula tip  in the right brachiocephalic vein. Left femoral artery aortic balloon  pump with superior tip at the proximal descending aorta. Left femoral  vein central venous catheter tip in the proximal left common femoral  vein. Endotracheal tube tip at the ron, extending a short distance  into the right mainstem bronchus. Lee catheter balloon in tip in the  bladder.    CHEST:  THYROID: Thyroid is " unremarkable.    LUNGS/PLEURA: Multifocal linear and consolidative opacities throughout  the lungs most predominant in the dependent right upper lobe.  Groundglass and nodular consolidative opacities in the left lower  lobe. Unchanged 6 mm solid pulmonary nodule in the right upper lobe  (7/56). Stable 5 mm solid pulmonary nodule versus intrafissural lymph  node in the anterior right middle lobe (7/116). Bilateral interlobular  septal thickening. Diffuse bilateral peribronchial cuffing. Scattered  endobronchial mucous plugging, greatest in the left lower lobe.  Calcification within the central left lower lobar bronchus. Trace  bilateral pleural effusions. No pneumothorax.     MEDIASTINUM: Cardiomegaly with coronary stents in place. Moderate left  atrial and ventricular enlargement. No pericardial effusion. No  suspicious mediastinal lymphadenopathy.  The esophagus is  unremarkable.    CHEST WALL: No suspicious axillary lymphadenopathy.    ABDOMEN/PELVIS:  LIVER: No focal hepatic mass.    BILIARY: Normal appearance of the gallbladder. No intra- or  extrahepatic biliary dilation.    PANCREAS: No focal pancreatic mass or ductal dilation.    SPLEEN: No splenic mass.    ADRENAL GLANDS: Within normal limits.    URINARY TRACT: Mild retention of iodinated contrast in the renal  cortices. Partially exophytic intermediately attenuating lesion in the  midpole of the right kidney measuring up to 2.4 cm (series 9 image  310). No bladder wall thickening.    REPRODUCTIVE ORGANS: No suspicious pelvic mass.    STOMACH: Within normal limits.    BOWEL: Diffusely distended, fluid-filled small bowel. No pneumatosis  or portal venous gas.  Appendix is within normal limits.    PERITONEUM/FLUID: Mild portacaval and mid abdominal mesenteric  haziness. No focal fluid collection or evidence of free fluid or free  air.    VESSELS: No aneurysmal dilatation of the abdominal aorta.    LYMPH NODES: No lymphadenopathy.    BONES/SOFT TISSUES:  Multiple bilateral nondisplaced anterolateral rib  fractures. No aggressive osseous lesions. Lower lumbar facet  arthropathy. Chronic right L5 pars interarticularis defect.  Impression: IMPRESSION:  1.  Multifocal linear and consolidative opacities throughout the lungs  most prominent in the right upper lobe which could represent  atelectasis, pulmonary hemorrhage, or infection/aspiration in the  context of recent cardiac arrest.  2.  Nodular consolidative opacities in the left lower lobe consistent  with aspiration pneumonitis versus ongoing postobstructive pneumonia  in the setting of a central left lower lobe broncholith (as seen on  2/21/2024 chest CT).  3.  Findings of pulmonary edema with trace bilateral pleural  effusions.  4.  Endotracheal tube tip with slight mainstem bronchus intubation,  recommend retraction approximately 4 cm. Other supportive devices as  described above.   5.  Cardiomegaly with left atrial enlargement.  6.  Intermediately attenuating lesion measuring up to 2.4 cm in the  midpole of the right kidney is indeterminant on these noncontrast  images raises concern for renal cell carcinoma. Recommend further  characterization with renal mass protocol MRI when clinically  appropriate.  7.  Multiple bilateral nondisplaced anterolateral rib fractures  related to chest compressions.    [Access Center: Right mainstem intubation]    This report will be copied to the [facility] to ensure a  provider acknowledges the finding. Access Center is available Monday  through Friday 8am-3:30 pm.     [Consider Follow Up: Indeterminate right renal mass]    This report will be copied to the [facility] to ensure a  provider acknowledges the finding. Access Center is available Monday  through Friday 8am-3:30 pm.    I have personally reviewed the examination and initial interpretation  and I agree with the findings.    LIZBETH ADAMS,          SYSTEM ID:  [id number]  CT Head w/o  Contrast  Narrative: EXAM: CT HEAD W/O CONTRAST  7/30/2024 9:30 AM     HISTORY:  Cardiac Arrest, Anoxic Encephalopathy       COMPARISON:  Same day CT    TECHNIQUE: Using multidetector thin collimation helical acquisition  technique, axial, coronal and sagittal CT images from the skull base  to the vertex were obtained without intravenous contrast.   (topogram) image(s) also obtained and reviewed.    FINDINGS:  Contrast within the vascular system. Edematous appearance of the  parenchyma with diffusely decreased gray-white differentiation. Poor  delineation of the deep nuclei. Diffuse effacement of the cerebral  sulci. No transtentorial herniation. No midline shift.    Ventricles are proportionate to the cerebral sulci. Clear basal  cisterns.    The bony calvaria and the bones of the skull base are normal. Mucosal  thickening of the maxillary and ethmoid sinuses. The mastoid air cells  are clear. Grossly normal orbits.   Impression: IMPRESSION: Findings consistent with diffuse hypoxic ischemic injury.  No transtentorial herniation at this time.     [Urgent Result: Diffuse hypoxic ischemic injury]    Finding was identified on 7/30/2024 9:32 AM.     Dr. Miguel was contacted by Dr. Pickens at 7/30/2024 9:37 AM and  verbalized understanding of the urgent finding.     I have personally reviewed the examination and initial interpretation  and I agree with the findings.    CHRISTINE WRIGHT MD         SYSTEM ID:  [id number]  Cardiac Catheterization    Ost LAD to Prox LAD lesion is 40% stenosed.    1st Diag lesion is 30% stenosed.    Dist Cx lesion is 50% stenosed.    RPDA lesion is 50% stenosed.    Mid RCA to Dist RCA lesion is 50% stenosed.    Dist LAD lesion is 50% stenosed.    2V CAD with 40% ISR of proximal LAD stent which tapers down to a small   caliber distal LAD with moderate disease; 30% ISR of proximal D1 stent;   proximal Lcx with mild disease with a large caliber OM2 without   significant disease, small caliber  distal Lcx with moderate disease;   dominant RCA with moderate disease in mid segment and 50% ISR of proximal   rPDA stent at the bifurcation with PL.  No culprit lesion for VT/VF   identified.   Uncomplicated L femoral access.     Recent Results (from the past 24 hour(s))   Cardiac Catheterization    Narrative      Ost LAD to Prox LAD lesion is 40% stenosed.    1st Diag lesion is 30% stenosed.    Dist Cx lesion is 50% stenosed.    RPDA lesion is 50% stenosed.    Mid RCA to Dist RCA lesion is 50% stenosed.    Dist LAD lesion is 50% stenosed.    2V CAD with 40% ISR of proximal LAD stent which tapers down to a small   caliber distal LAD with moderate disease; 30% ISR of proximal D1 stent;   proximal Lcx with mild disease with a large caliber OM2 without   significant disease, small caliber distal Lcx with moderate disease;   dominant RCA with moderate disease in mid segment and 50% ISR of proximal   rPDA stent at the bifurcation with PL.  No culprit lesion for VT/VF   identified.   Uncomplicated L femoral access.     CT Head w/o Contrast   Result Value    Radiologist flags Diffuse hypoxic ischemic injury (Urgent)    Narrative    EXAM: CT HEAD W/O CONTRAST  7/30/2024 9:30 AM     HISTORY:  Cardiac Arrest, Anoxic Encephalopathy       COMPARISON:  Same day CT    TECHNIQUE: Using multidetector thin collimation helical acquisition  technique, axial, coronal and sagittal CT images from the skull base  to the vertex were obtained without intravenous contrast.   (topogram) image(s) also obtained and reviewed.    FINDINGS:  Contrast within the vascular system. Edematous appearance of the  parenchyma with diffusely decreased gray-white differentiation. Poor  delineation of the deep nuclei. Diffuse effacement of the cerebral  sulci. No transtentorial herniation. No midline shift.    Ventricles are proportionate to the cerebral sulci. Clear basal  cisterns.    The bony calvaria and the bones of the skull base are normal.  Mucosal  thickening of the maxillary and ethmoid sinuses. The mastoid air cells  are clear. Grossly normal orbits.       Impression    IMPRESSION: Findings consistent with diffuse hypoxic ischemic injury.  No transtentorial herniation at this time.     [Urgent Result: Diffuse hypoxic ischemic injury]    Finding was identified on 7/30/2024 9:32 AM.     Dr. Miguel was contacted by Dr. Pickens at 7/30/2024 9:37 AM and  verbalized understanding of the urgent finding.     I have personally reviewed the examination and initial interpretation  and I agree with the findings.    CHRISTINE WRIGHT MD         SYSTEM ID:  [id number]   CT Chest Abdomen Pelvis w/o Contrast   Result Value    Radiologist flags Right mainstem intubation (Urgent)    Radiologist flags Indeterminate right renal mass    Narrative    EXAMINATION: CT CHEST ABDOMEN PELVIS W/O CONTRAST, 7/30/2024 9:31 AM    TECHNIQUE:  Helical CT images from the thoracic inlet through the  symphysis pubis were obtained without IV contrast.    COMPARISON: CT 2/21/2024    HISTORY: Cardiac Arrest: concern for pulmonary edema and possible  abdominal bleeding post procedure    FINDINGS:  LINES/TUBES/DRAINS: Right femoral AV ECMO cannulation with arterial  cannula tip at the abdominal aortic bifurcation and venous cannula tip  in the right brachiocephalic vein. Left femoral artery aortic balloon  pump with superior tip at the proximal descending aorta. Left femoral  vein central venous catheter tip in the proximal left common femoral  vein. Endotracheal tube tip at the ron, extending a short distance  into the right mainstem bronchus. Lee catheter balloon in tip in the  bladder.    CHEST:  THYROID: Thyroid is unremarkable.    LUNGS/PLEURA: Multifocal linear and consolidative opacities throughout  the lungs most predominant in the dependent right upper lobe.  Groundglass and nodular consolidative opacities in the left lower  lobe. Unchanged 6 mm solid pulmonary nodule in the right  upper lobe  (7/56). Stable 5 mm solid pulmonary nodule versus intrafissural lymph  node in the anterior right middle lobe (7/116). Bilateral interlobular  septal thickening. Diffuse bilateral peribronchial cuffing. Scattered  endobronchial mucous plugging, greatest in the left lower lobe.  Calcification within the central left lower lobar bronchus. Trace  bilateral pleural effusions. No pneumothorax.     MEDIASTINUM: Cardiomegaly with coronary stents in place. Moderate left  atrial and ventricular enlargement. No pericardial effusion. No  suspicious mediastinal lymphadenopathy.  The esophagus is  unremarkable.    CHEST WALL: No suspicious axillary lymphadenopathy.    ABDOMEN/PELVIS:  LIVER: No focal hepatic mass.    BILIARY: Normal appearance of the gallbladder. No intra- or  extrahepatic biliary dilation.    PANCREAS: No focal pancreatic mass or ductal dilation.    SPLEEN: No splenic mass.    ADRENAL GLANDS: Within normal limits.    URINARY TRACT: Mild retention of iodinated contrast in the renal  cortices. Partially exophytic intermediately attenuating lesion in the  midpole of the right kidney measuring up to 2.4 cm (series 9 image  310). No bladder wall thickening.    REPRODUCTIVE ORGANS: No suspicious pelvic mass.    STOMACH: Within normal limits.    BOWEL: Diffusely distended, fluid-filled small bowel. No pneumatosis  or portal venous gas.  Appendix is within normal limits.    PERITONEUM/FLUID: Mild portacaval and mid abdominal mesenteric  haziness. No focal fluid collection or evidence of free fluid or free  air.    VESSELS: No aneurysmal dilatation of the abdominal aorta.    LYMPH NODES: No lymphadenopathy.    BONES/SOFT TISSUES: Multiple bilateral nondisplaced anterolateral rib  fractures. No aggressive osseous lesions. Lower lumbar facet  arthropathy. Chronic right L5 pars interarticularis defect.      Impression    IMPRESSION:  1.  Multifocal linear and consolidative opacities throughout the lungs  most  prominent in the right upper lobe which could represent  atelectasis, pulmonary hemorrhage, or infection/aspiration in the  context of recent cardiac arrest.  2.  Nodular consolidative opacities in the left lower lobe consistent  with aspiration pneumonitis versus ongoing postobstructive pneumonia  in the setting of a central left lower lobe broncholith (as seen on  2/21/2024 chest CT).  3.  Findings of pulmonary edema with trace bilateral pleural  effusions.  4.  Endotracheal tube tip with slight mainstem bronchus intubation,  recommend retraction approximately 4 cm. Other supportive devices as  described above.   5.  Cardiomegaly with left atrial enlargement.  6.  Intermediately attenuating lesion measuring up to 2.4 cm in the  midpole of the right kidney is indeterminant on these noncontrast  images raises concern for renal cell carcinoma. Recommend further  characterization with renal mass protocol MRI when clinically  appropriate.  7.  Multiple bilateral nondisplaced anterolateral rib fractures  related to chest compressions.    [Access Center: Right mainstem intubation]    This report will be copied to the [facility] to ensure a  provider acknowledges the finding. Access Center is available Monday  through Friday 8am-3:30 pm.       [Consider Follow Up: Indeterminate right renal mass]    This report will be copied to the [facility] to ensure a  provider acknowledges the finding. Access Center is available Monday  through Friday 8am-3:30 pm.        I have personally reviewed the examination and initial interpretation  and I agree with the findings.    LIZBETH ADAMS,          SYSTEM ID:  [id number]

## 2024-07-30 NOTE — PROGRESS NOTES
Preliminary EEG report:    No posterior dominant rhythm or sleep-wake cycling was observed.  There was severe generalized suppression of the background.  Minimal electrocerebral activities with amplitudes up to 5  V could be seen when sensitivity was increased to 2  V/mm.  No epileptiform discharges or electrographic seizures were recorded.  Findings are consistent with severe diffuse nonspecific encephalopathy.    Ester Frias MD

## 2024-07-30 NOTE — Clinical Note
Arrhythmia Type: atrial fibrillation.   Method of Cardioversion: defibrillated.   The arrhythmia was not terminated. Energy shock delivered: 200 joules.   Time shock delivered: 07:55 CDT.

## 2024-07-30 NOTE — PROGRESS NOTES
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  Was initially hypertensive and had to wean off pressor but has slowly required the restart of those, currently on 2.4 vaso, 0.1 levo, and 0.08 epi. Flowing 4.4L on ECMO, 60%, and sweep 3L; 3L LR given today for chugging.      Pulmonary: Ventilator settings as listed below.  Pt breathing at a rate of 4-6 because he is double triggering his vent with his large abdominal/neurogenic breathing, so not actually breathing at a rate of 10 like the ventilator thinks; team aware and gasses trended while on ECMO.  Lung sounds coarse with wheezes posteriorly.    Vent Mode: PCV Plus assist  FiO2 (%): 50 %  Resp Rate (Set): 10 breaths/min  Tidal Volume (Set, mL): 450 mL  PEEP (cm H2O): 7 cmH2O  Inspiratory Pressure (cm H2O) (Drager Cande): 20  Resp: (!) 5    GI/: OG to suction.  UOP has fallen off and 0-5 mL/hr.  Rectal tube placed with large amounts of output.      Intake/Output Summary (Last 24 hours) at 7/30/2024 1706  Last data filed at 7/30/2024 1700  Gross per 24 hour   Intake 3659.34 ml   Output 1283 ml   Net 2376.34 ml     Neuro: Corneal response, painful stimuli, babinski, cough, gag, respiratory, and pupillary response all absent.  Npi 0.  [facility] notified.    Skin: Abrasion from josé miguel.    Major Shift Events:  Admitted to unit around 0930.  Coronaries were clear.  Versed off since 0900.  3L LR given for chugging, not requiring blood products.    Plan: Continue ECMO protocol pending neurologic decline.    Note that IABP is present in this patient, and that titration of vasopressors was done via IABP MAP which does not flow over to flowsheets due to [place]'s policy.  Arterial line MAPs are present q15 minutes, but not used for titrations due to its inherent inaccuracies.  However, MAP of arterial line is always taken into consideration.  BLE pulses checked q1 hour as well as IABP and sheath side ports fast-flushed q1-2 hours per IABP protocol; any changes in  pulses would be charted as so between writers q4hr assessments.    Documenting RN assumed care of this patient from 07/30/24 [id number].  For vital signs, drip titrations, and complete assessments, please see documentation flowsheets; assessments charted by exception.  All ICU standards of care were followed.  Any critical lab values were called to MD within 5 minutes of writer receiving them from lab.    Joey Galarza RN, BSN, CCRN on 7/30/2024 at 5:06 PM

## 2024-07-30 NOTE — PROGRESS NOTES
[facility]         Intra-Aortic Balloon Pump Insertion:         Insertion Date: 7/30/2024  Insertion Time: 0805  Insertion Locations: CCL    Insertion Details:  Physician: Fawn  Site: Left Femoral Artery  Catheter Size: 7.5 Fr.  Balloon Volume: 40 cc  Fiberoptic: NO  Sheath: YES  Position verified with: fluoroscopy    Initial Settings:  Mode: Semi Auto      Machine #: [id number]    Jason Velarde, RRT  ECMO Specialist  7/30/2024 8:20 AM

## 2024-07-30 NOTE — Clinical Note
Arrhythmia Type: atrial fibrillation.   Method of Cardioversion: defibrillated.   The arrhythmia was not terminated. Energy shock delivered: 200 joules.   Time shock delivered: 07:56 CDT.

## 2024-07-30 NOTE — Clinical Note
Arrhythmia Type: atrial fibrillation.   Method of Cardioversion: defibrillated.   The arrhythmia was not terminated. Energy shock delivered: 360 joules.   Time shock delivered: 08:00 CDT.

## 2024-07-30 NOTE — Clinical Note
Arrhythmia Type: atrial fibrillation.   Method of Cardioversion: synchronous.   The arrhythmia was terminated.   Energy shock delivered: 360 joules.   Time shock delivered: 08:16 CDT.   Post cardioversion rhythm: sinus rhythm.

## 2024-07-30 NOTE — SIGNIFICANT EVENT
[facility] Note:        Initial Referral:        Thank you for consulting [facility] to evaluate this patient for potential organ, tissue, and eye donation. [facility] will connect in person daily for updates and review the Electronic Medical Record until either the patient improves, or end of life decisions are made. Please connect with [facility] ([phone]) for the following:        Patient has a neuro decline or becomes unstable        Patient is to be tested for brain death         Family mentions donation        Family Care Conference anticipated        Family expresses end of life decision        Prior to extubation or moves to comfort cares        Currently there is no contraindication to donation. As always, a referral to [facility] is not an indication of prognosis but satisfies CMS conditions of participation for timely referral.        Referral Number: [id number]      Thank you for the referral of this patient.       DASH WilsonN, RN  Donation Coordinator

## 2024-07-30 NOTE — Clinical Note
IABP inserted in the left femoral artery. IABP inserted with 40 cc balloon volume Lot number is: [id number]

## 2024-07-30 NOTE — PROGRESS NOTES
[facility]    ECLS Shift Summary:     ECMO Equipment:  Console Serial Number: [id number]  Circuit Lot Number: [id number]  Oxygenator Lot Number: [id number]    Circuit Assessment: Fibrin  Fibrin Location: Small fibrin at connectors    Arterial ECMO Cannula: 17 Fr in the Right Femoral Artery  Venous ECMO Cannula: 25 Fr in the Right Femoral Vein  Distal Perfusion Catheter: 8 Fr in the Right Superficial Femoral Artery    ECMO Cannula Arterial Right femoral artery-Site Assessment: WDL, Sutured, Secure  ECMO Cannula Venous Right femoral vein-Site Assessment: WDL, Sutured, Secure  Distal Perfusion Catheter Right superficial femoral artery-Site Assessment: WDL, Sutured, Secure  ECMO Cannula Arterial Right femoral artery-Site Intervention: No intervention needed  ECMO Cannula Venous Right femoral vein-Site Intervention: No intervention needed  Distal Perfusion Catheter Right superficial femoral artery-Site Intervention: No intervention needed    Patient remains on V-A ECMO, all equipment is functioning and alarms are appropriately set. RPM's: 4000 with Blood Flow (Circuit) Av.2 LPM (Min: 3.89 LPM  Max: 4.44 LPM). Sweep is at 3 LPM and 60 %. Extremities are warm/perfused with pulses despite low leg NIRs.     Significant Shift Events:   Patient arrived to ICU around 9 AM today. 3 liters of LR administered throughout shift for circuit chugging. Heparin started. Heater temp increased by 0.5 degrees C/hr until it reached 37.0 degrees at 1330.     Vent settings:  Vent Mode: PCV Plus assist  FiO2 (%): 50 %  Resp Rate (Set): 10 breaths/min  Tidal Volume (Set, mL): 450 mL  PEEP (cm H2O): 7 cmH2O  Inspiratory Pressure (cm H2O) (Drager Cande): 20  Resp: (!) 5    Anticoagulation and Volume Status:  Dose (units/hr) HEParin: 600 Units/hr  Rate (mL/hr) HEParin: 6 mL/hr  Concentration HEParin: 100 Units/mL      Most recent: ACT  (seconds): 195 seconds (goal 180-200)    Urine output is  minimal/decreased. Cloudy, Barbra.  Blood loss was very low. Product given included none. 3 Liter LR administered.     Intake/Output Summary (Last 24 hours) at 7/30/2024 1815  Last data filed at 7/30/2024 1800  Gross per 24 hour   Intake 4212.74 ml   Output 1283 ml   Net 2929.74 ml       Labs:  Recent Labs   Lab 07/30/24  1804 07/30/24  1549 07/30/24  1429 07/30/24  1218   PH 7.27* 7.30* 7.26* 7.23*   PCO2 42 42 46* 50*   PO2 140* 148* 104 77*   HCO3 20* 20* 21 21   O2PER 50 50  50  60 50 60       Lab Results   Component Value Date    HGB 15.2 07/30/2024    PHGB 80 (H) 07/30/2024     07/30/2024    FIBR 240 07/30/2024    INR 1.51 (H) 07/30/2024    PTT 63 (H) 07/30/2024    DD 12.08 (H) 07/30/2024     Plan:  Plan is to continue [facility] ECMO at this time.    Chloe Borges, RT  ECMO Specialist  7/30/2024 6:15 PM

## 2024-07-30 NOTE — PHARMACY-VANCOMYCIN DOSING SERVICE
Pharmacy Vancomycin Initial Note  Date of Service 2024  Patient's  1965  59 year old, male    Indication: Aspiration Pneumonia    Current estimated CrCl = Estimated Creatinine Clearance: 52.1 mL/min (A) (based on SCr of 1.53 mg/dL (H)).    Creatinine for last 3 days  2024:  9:23 AM Creatinine 1.53 mg/dL    Recent Vancomycin Level(s) for last 3 days  No results found for requested labs within last 3 days.      Vancomycin IV Administrations (past 72 hours)        No vancomycin orders with administrations in past 72 hours.                    Nephrotoxins and other renal medications (From now, onward)      Start     Dose/Rate Route Frequency Ordered Stop    24 1330  vancomycin (VANCOCIN) 2,000 mg in sodium chloride 0.9 % 250 mL intermittent infusion         2,000 mg (central catheter)  over 90 Minutes Intravenous EVERY 24 HOURS 24 1255      24 1200  vasopressin 1 unit/mL MAX Conc (PITRESSIN) infusion         0.5-4 Units/hr  0.5-4 mL/hr  Intravenous CONTINUOUS 24 1104      24 0900  norepinephrine (LEVOPHED) 16 mg in  mL infusion MAX CONC CENTRAL LINE         0.01-0.6 mcg/kg/min × 97.5 kg (Dosing Weight)  0.9-54.8 mL/hr  Intravenous CONTINUOUS 24 0852              Contrast Orders - past 72 hours (72h ago, onward)      Start     Dose/Rate Route Frequency Stop    24 0828  iopamidol (ISOVUE-370) solution  Status:  Discontinued           ONCE PRN 24 0909               Plan:  Start vancomycin  2000 mg IV q24h.   Vancomycin monitoring method: AUC  Vancomycin therapeutic monitoring goal: 400-600 mg*h/L  Pharmacy will check vancomycin levels as appropriate in 1-3 Days.    Serum creatinine levels will be ordered daily for the first week of therapy and at least twice weekly for subsequent weeks.      Maya Smalls, PachecoD

## 2024-07-31 NOTE — PROGRESS NOTES
Cardiology ICU Progress Note    HPI:  Elias Solorzano is a 59 year old  male being admitted to the CICU on 07/30/2024 s/p refractory VF cardiac arrest requiring VA ECMO, patient was cannulated by mobile at [facility] ([facility]). The patient has an unknown past medical hx.     Witnessed arest (Y/N): N  Home or public location (Y/N): home  Bystander CPR (Y/N): N  Time to compressions: 5-10 mins estimated  Time to cannulation: 61 mins (0508 dispatch time, 0609 cannulation)  Initial rhythm: VF  Did they have intermittent ROSC (Y/N): N  # of shocks: 4  Epi: 3 mg  Amio: 450 mg    Mod disease, no culprit lesion, no stents. IABP placed in CCL here at the .     Subjective and Interval:  -chugging overnight requiring multiple liters of fluid    Assessment and plan by system:  ==Today's changes ==  -CTH head today  -stop lidocaine infusion  -change to PCV  -nephrology consult  -stop vancomycin    == Neurology ==   #Concern for anoxic brain injury    -Intubated, sedated. Avoiding hyperthermia  -continuous vEEG     CT head shows Findings consistent with diffuse hypoxic ischemic injury.  No transtentorial herniation at this time.     Current sedation:  RASS ([place] Agitation-Sedation Scale): -5-->unarousable    Plan:  -Continue sedation with a RASS goal -2 to 3  -Spontaneous awakening trial as tolerated  -Permissive hypercapnea and hypernatremia for neuroprotection    == Cardiovascular ==  #Refractory VF Cardiac arrest   #Shock requiring VA ECMO  #CAD  #Cardiomyopathy  #Dyslipidemia  Peripheral V-A ECMO inserted via LCFA and LCFV   Angiogram:   2V CAD with 40% ISR of proximal LAD stent which tapers down to a small caliber distal LAD with moderate disease; 30% ISR of proximal D1 stent; proximal Lcx with mild disease with a large caliber OM2 without significant disease, small caliber distal Lcx with moderate disease; dominant RCA with moderate disease in mid segment and 50% ISR of proximal rPDA stent at the bifurcation with PL.   No culprit lesion for VT/VF identified.   Uncomplicated L femoral access.  TTE: pending  ECG Rhythm: Sinus rhythm  ECMO :  Mode: V-A   Pump Type: Cardiohelp  RPM's: (S) 3500  Blood Flow (Circuit) LPM: 3.59 LPM  Sweep LPM: 4 LPM (weaned due to CO2 of 34 on last abg)  Sweep FiO2   %: 60 %  Venous Pressure  mmHg: -88 mmHg  Arterial Pressure  mmH mmHg  Internal Pressure mmH mmHg  Pressure Change mmH mmHg    Current Pressors/inotropes/antiarrythmic:  Dose (mcg/kg/min) Norepinephrine: 0.4 mcg/kg/min, Dose (units/hr) Vasopressin: 4 Units/hr, and Dose (mcg/kg/min) Epinephrine: 0.1 mcg/kg/min    Hemodynamics:  Art Line  Arterial Line BP: 110/56  Arterial Line MAP (mmHg): 77 mmHg  Arterial Line Location: Left radial  Art Line Wave Form: Appropriate wave forms    Invasive Hemodynamic Monitoring:                            Plan:  -Continue ASA 81mg and ticagrelor 90mg BID   -Hold lipitor for now given shock liver  -Hold ACE/ARB for now given likely reduced renal fxn after arrest  -Hold beta blocker given shock  -Telemetry monitoring  -Trend troponin   -Continue ECMO support  -Continue IABP  -FBG even, hold diuresis    == Pulmonary ==  #Acute hypoxemic respiratory failure requiring intubation  #Probable aspiration pneumonia  #rib fractures  Vent Settings:   Vent Mode: CMV/AC  FiO2 (%): 50 %  Resp Rate (Set): 10 breaths/min  Tidal Volume (Set, mL): 350 mL  PEEP (cm H2O): 7 cmH2O  Inspiratory Pressure (cm H2O) (Drager Cande): 20  Resp: 10    ABG:   Recent Labs   Lab 24  0734 24  0603 24  0358   PH 7.44 7.40 7.37   PCO2 31* 34* 34*   PO2 170* 125* 149*   HCO3 21 21 20*   O2PER 50 60 60  60  50     CXR: pending  CT CAP no evidence of bleeding, cannula in stable position, ETT in appropriate  position.     Plan:  -Wean vent as able  -Daily CXR  -Serial ABGs   -Consider scheduled duonebs if signs of lung dz, currently PRN    -Peridex  -FBG and diuresis as above        == Gastrointestinal, Nutrition  ==  #Concern for Shock liver 2/2 cardiac arrest  #Hypoalbuminemia   No known medical hx.     CT CAP above  Recent Labs   Lab 07/31/24 0358 07/30/24 2144 07/30/24  1549   AST 1,648* 2,148* 2,464*   * 319* 341*   BILITOTAL 0.8 0.6 0.7   ALBUMIN 2.7* 2.8* 3.0*   PROTTOTAL 4.7* 4.8* 5.0*   ALKPHOS 41 46 50     Diet NPO in immediate post arrest setting    Plan:  -Monitor LFTs  -Bowel regimen in place  -GI Prophylaxis: PPI; Protonix 40 mg daily     == Renal, Electrolytes ==  #Concern for Acute Renal Injury 2/2 cardiac arrest  #Lactic acidosis    No intake or output data in the 24 hours ending 07/30/24 0803  Recent Labs   Lab 07/31/24 0358 07/30/24 2144 07/30/24  1549   * 148* 150*   POTASSIUM 3.5 3.6 2.9*   CHLORIDE 115* 115* 113*   CO2 19* 18* 19*   BUN 26.5* 23.7* 20.0   CR 3.01* 2.64* 2.11*   PHOS 1.0*  --   --    MAG 2.0 2.3 2.0     Plan:  -Avoid nephrotoxins, renally dose meds  -Monitor urine output  -FBG and diuresis as above    == Infectious Disease ==  #Aspiration Pneumonia- in the setting of arrest.  CXR/CAP as above.   #Leukocytosis  Recent Labs   Lab 07/31/24 0358 07/30/24 2144 07/30/24  1549   WBC 13.2* 14.6* 17.8*     No data recorded.    Cultures thus far:  Pending     Antibiotics  Anti-infectives (From now, onward)      Start     Dose/Rate Route Frequency Ordered Stop    07/30/24 1000  cefTRIAXone (ROCEPHIN) 2 g vial to attach to  ml bag for ADULTS or NS 50 ml bag for PEDS         2 g  over 30 Minutes Intravenous EVERY 24 HOURS 07/30/24 0852 08/04/24 1159                       Plan:  -Continue CTX x 5D for aspiration coverage  -Monitor for signs of infection  -Avoid fevers     == Hematology ==  # Anemia of critical illness  Recent Labs   Lab 07/31/24 0358 07/30/24 2144 07/30/24  1549   HGB 14.2 14.7 15.2   HCT 39.5* 41.9 42.0   * 147* 177     Recent Labs   Lab 07/31/24 0358 07/30/24 2144 07/30/24  1549   INR 1.64* 1.55* 1.51*   PTT 92* 89* 63*     Hgb stable. No e/o  bleeding  Receiving heparin for ECMO with ACT goal 140-160  Dose (units/hr) HEParin: 0 Units/hr  ACT  (seconds): 207 seconds  US LE w/ arterial duplex pending  DVT PPX: Heparin as above    Plan:  -Continue Heparin with ACT goal as above  -Transfusion parameters:   -1u PRBC for hbg < 7.0   -1u platelet for plt < 50   -1u FFP for INR > 3   -5u cryoprecipitate for fibrinogen <150     == Endocrinology ==  #Hyperglycemia   Hgb a1c   Recent Labs   Lab 07/30/24  0923   A1C 5.5     Recent Labs   Lab 07/31/24  0740 07/31/24  0602 07/31/24  0358 07/31/24  0357   GLC 95 100* 137*  142* 133*   Dose (units/hr) Insulin: 1 Units/hr    Plan  -Insulin infusion as needed    == Integumentary ==  -No skin issues  -CTM cannula sites    == Lines/Tubes/Drains ==  Peripheral IV 07/30/24 Anterior;Distal;Right Upper arm (Active)   Site Assessment [facility] 07/31/24 0400   Line Status Infusing 07/31/24 0400   Dressing Transparent 07/31/24 0400   Dressing Status clean;dry;intact 07/31/24 0400   Line Intervention Flushed 07/30/24 1200   Phlebitis Scale 0-->no symptoms 07/31/24 0400   Infiltration? no 07/31/24 0400   Number of days: 1       Peripheral IV 07/30/24 Anterior;Distal;Left Upper arm (Active)   Site Assessment [facility] 07/31/24 0400   Line Status Saline locked 07/31/24 0400   Dressing Transparent 07/31/24 0400   Dressing Status clean;dry;intact 07/31/24 0400   Line Intervention Flushed 07/30/24 1200   Phlebitis Scale 0-->no symptoms 07/31/24 0400   Infiltration? no 07/31/24 0400   Number of days: 1       Arterial Line 07/30/24 Wrist (Active)   Site Assessment [facility] 07/31/24 0400   Line Status Pulsatile blood flow 07/31/24 0400   Arterine Line Cap Change Due 08/03/24 07/31/24 0400   Art Line Waveform Appropriate;Square wave test performed 07/31/24 0400   Art Line Interventions Leveled;Connections checked and tightened;Flushed per protocol 07/31/24 0400   Color/Movement/Sensation Capillary refill less than 3 sec 07/31/24 0400   Line Necessity Yes,  meets criteria 07/31/24 0400   Dressing Type Transparent 07/31/24 0400   Dressing Status Clean, dry, intact 07/31/24 0400   Dressing Intervention Dressing changed/new dressing 07/30/24 1200   Dressing Change Due 08/06/24 07/31/24 0400   Number of days: 1       Arterial Sheath  07/30/24 Femoral (Active)   Specific Qualities Stat Locked;Sutured 07/31/24 0400   Site Assessment WDL 07/31/24 0400   Dressing Type Biopatch;Transparent 07/31/24 0400   Dressing Intervention Dressing changed/new dressing 07/30/24 1200   Arterial Sheath Comment IABP 07/31/24 0400   Number of days: 1       CVC Triple Lumen Left Femoral Non - valved (open ended) (Active)   Site Assessment WDL 07/31/24 0400   External Cath Length (cm) 0 cm 07/30/24 1600   Dressing Chlorhexidine disk;Transparent 07/31/24 0400   Dressing Status clean;dry;intact 07/31/24 0400   Dressing Intervention dressing changed 07/30/24 1200   Dressing Change Due 08/06/24 07/31/24 0400   Line Necessity Yes, meets criteria 07/31/24 0400   Blue - Status infusing 07/31/24 0400   Blue - Cap Change Due 08/03/24 07/31/24 0400   Blue - Intervention Flushed 07/30/24 1200   Brown - Status infusing 07/31/24 0400   Brown - Cap Change Due 08/03/24 07/31/24 0400   Brown - Intervention Flushed 07/30/24 1200   White - Status infusing 07/31/24 0400   White - Cap Change Due 08/03/24 07/31/24 0400   White - Intervention Flushed 07/30/24 1200   Phlebitis Scale 0-->no symptoms 07/31/24 0400   Infiltration? no 07/31/24 0400   CVC Comment TL CVC 07/31/24 0400   Number of days:        ECMO Cannula Arterial Right femoral artery (Active)   Site Assessment WDL;Secure;Sutured 07/31/24 0600   Skin Assessment Clean;Dry;Intact 07/31/24 0600   Dressing Type Silverlon;Ioban 07/31/24 0600   Dressing Status clean;dry;intact 07/31/24 0600   Site Intervention No intervention needed 07/31/24 0600   Number of days:        ECMO Cannula Venous Right femoral vein (Active)   Site Assessment WDL;Secure;Sutured 07/31/24  0600   Skin Assessment Clean;Dry;Intact 07/31/24 0600   Dressing Type Silverlon;Ioban 07/31/24 0600   Dressing Status clean;dry;intact 07/31/24 0600   Site Intervention No intervention needed 07/31/24 0600   Number of days:        Distal Perfusion Catheter Right superficial femoral artery (Active)   Site Assessment WDL;Sutured;Secure 07/31/24 0600   Dressing Type Ioban 07/31/24 0600   Dressing Status clean;dry;intact 07/31/24 0600   Site Intervention No intervention needed 07/31/24 0600   Number of days:        ETT Cuffed 7 mm (Active)   Secured at (cm) 24 cm 07/31/24 0406   Measured from Lips 07/31/24 0406   Position Right 07/31/24 0600   Secured by Commercial tube novoa 07/31/24 0406   Bite Block None Present 07/31/24 0406   Site Appearance Clean;Dry 07/31/24 0400   Tube Care Site care done 07/31/24 0400   Safety Measures Manual resuscitator at bedside 07/31/24 0406   Number of days: 1       NG/OG/NJ Tube Orogastric 16 fr Center mouth (Active)   Site Description WDL 07/31/24 0400   Status Low continuous suction 07/31/24 0400   Drainage Appearance Brown;Bile 07/30/24 2000   Placement Confirmation [place] unchanged 07/31/24 0400   [place] (cm marking) at nare/mouth 80 cm 07/31/24 0400   Container Amount 350 mL 07/31/24 0700   Output (ml) 0 ml 07/31/24 0700   Number of days: 1       Rectal Tube With balloon (Active)   Balloon fill volume  35 cc 07/31/24 0000   Stool Leakage None 07/31/24 0000   Rectal Tube Container Volume 650 ml 07/31/24 0700   Rectal Tube Output 0 ml 07/31/24 0700   Number of days: 1       Urethral Catheter 07/30/24 16 fr (Active)   Tube Description Positional 07/31/24 0400   Catheter Care Done;Catheter wipes 07/31/24 0400   Collection Container Standard;Patent 07/31/24 0400   Securement Method Securing device (Describe) 07/31/24 0400   Rationale for Continued Use ICU only: hourly urine output needed for patient care 07/31/24 0400   Urine Output 35 mL 07/31/24 0700   Number of days: 1     ICU  "Checklist  Feeding: hold  Analgesia: hold  Sedation: hold   Thrombopx: heparin infusion  Head of bed: reverse trend  Ulcers: PPI  Glucose: insulin infusion  SBT: no  Bowel regimen: ordered  Indwelling cath: yes, needed  De-escalate meds:   Code Status: Full Code , VA ECMO candidate   Family updated by team. Patient seen and discussed with staff physician, Dr. Omar Gilman.    Сергей Wolf APRN, CNP  Critical Care Cardiology  Securely message with the Vocera Web Console (learn more here)    Objective:  Most recent vital signs:  Pulse 59   Temp 98.2  F (36.8  C) (Bladder)   Resp 10   Ht 1.575 m (5' 2\")   Wt 97.5 kg (214 lb 15.2 oz)   SpO2 100%   BMI 39.31 kg/m    Temp:  [94.8  F (34.9  C)-99  F (37.2  C)] 98.2  F (36.8  C)  Pulse:  [] 59  Resp:  [0-16] 10  MAP:  [62 mmHg-94 mmHg] 77 mmHg  Arterial Line BP: ()/(46-67) 110/56  FiO2 (%):  [50 %-60 %] 50 %  SpO2:  [95 %-100 %] 100 %    Physical exam:  General: critically ill, supine in bed, in NAD  HEENT: Pupils fixed by pupillometer, no scleral icterus or injection  CARDIAC: RRR, no m/r/g appreciated. Peripheral pulses dopplered  RESP: synchronous with mechanical ventilation, CTAB, no wheezes, rhonchi or crackles appreciated.  GI: soft, non-distended, BS hypoactive  : Urinary catheter present  EXTREMITIES: NO LE edema, pulses 2+. Femoral access site w/o bleeding, dressing C/D/I.   SKIN: No acute lesions appreciated on exposed skin.  NEURO: Intubated and sedated. Unable to assess language or mentation. Unresponsive to noxious stim x 4 extremities. No cough. No gag. Not over breathing vent.     Imaging/procedure results:  XR Chest Port 1 View  RESIDENT PRELIMINARY INTERPRETATION  Impression:   1. Endotracheal tube terminates in the midthoracic trachea, slightly  retracted compared to prior. Otherwise stable support devices.  2. Increased diffuse mixed pulmonary opacities, greatest in the left  upper lung fields. Differential includes infection, " edema, and/or  atelectasis.     Recent Results (from the past 24 hour(s))   Cardiac Catheterization    Narrative    Cardiac arrest.  VT/VF.  Cardiogenic shock.  Status post VA ECMO / ECPR.  Severe three vessel CAD with prior PCI to the LAD, RCA, and severe disease   of the small caliber distal LCx.  Patent stents in the RCA and LAD with mild in stent restenosis.  Central venous catheter placement.  Radial arterial line placement.  IABP placement.     CT Head w/o Contrast   Result Value    Radiologist flags Diffuse hypoxic ischemic injury (Urgent)    Narrative    EXAM: CT HEAD W/O CONTRAST  7/30/2024 9:30 AM     HISTORY:  Cardiac Arrest, Anoxic Encephalopathy       COMPARISON:  Same day CT    TECHNIQUE: Using multidetector thin collimation helical acquisition  technique, axial, coronal and sagittal CT images from the skull base  to the vertex were obtained without intravenous contrast.   (topogram) image(s) also obtained and reviewed.    FINDINGS:  Contrast within the vascular system. Edematous appearance of the  parenchyma with diffusely decreased gray-white differentiation. Poor  delineation of the deep nuclei. Diffuse effacement of the cerebral  sulci. No transtentorial herniation. No midline shift.    Ventricles are proportionate to the cerebral sulci. Clear basal  cisterns.    The bony calvaria and the bones of the skull base are normal. Mucosal  thickening of the maxillary and ethmoid sinuses. The mastoid air cells  are clear. Grossly normal orbits.       Impression    IMPRESSION: Findings consistent with diffuse hypoxic ischemic injury.  No transtentorial herniation at this time.     [Urgent Result: Diffuse hypoxic ischemic injury]    Finding was identified on 7/30/2024 9:32 AM.     Dr. Miguel was contacted by Dr. Pickens at 7/30/2024 9:37 AM and  verbalized understanding of the urgent finding.     I have personally reviewed the examination and initial interpretation  and I agree with the  findings.    CHRISTINE WRIGHT MD         SYSTEM ID:  [id number]   CT Chest Abdomen Pelvis w/o Contrast   Result Value    Radiologist flags Right mainstem intubation (Urgent)    Radiologist flags Indeterminate right renal mass    Narrative    EXAMINATION: CT CHEST ABDOMEN PELVIS W/O CONTRAST, 7/30/2024 9:31 AM    TECHNIQUE:  Helical CT images from the thoracic inlet through the  symphysis pubis were obtained without IV contrast.    COMPARISON: CT 2/21/2024    HISTORY: Cardiac Arrest: concern for pulmonary edema and possible  abdominal bleeding post procedure    FINDINGS:  LINES/TUBES/DRAINS: Right femoral AV ECMO cannulation with arterial  cannula tip at the abdominal aortic bifurcation and venous cannula tip  in the right brachiocephalic vein. Left femoral artery aortic balloon  pump with superior tip at the proximal descending aorta. Left femoral  vein central venous catheter tip in the proximal left common femoral  vein. Endotracheal tube tip at the ron, extending a short distance  into the right mainstem bronchus. Ele catheter balloon in tip in the  bladder.    CHEST:  THYROID: Thyroid is unremarkable.    LUNGS/PLEURA: Multifocal linear and consolidative opacities throughout  the lungs most predominant in the dependent right upper lobe.  Groundglass and nodular consolidative opacities in the left lower  lobe. Unchanged 6 mm solid pulmonary nodule in the right upper lobe  (7/56). Stable 5 mm solid pulmonary nodule versus intrafissural lymph  node in the anterior right middle lobe (7/116). Bilateral interlobular  septal thickening. Diffuse bilateral peribronchial cuffing. Scattered  endobronchial mucous plugging, greatest in the left lower lobe.  Calcification within the central left lower lobar bronchus. Trace  bilateral pleural effusions. No pneumothorax.     MEDIASTINUM: Cardiomegaly with coronary stents in place. Moderate left  atrial and ventricular enlargement. No pericardial effusion. No  suspicious  mediastinal lymphadenopathy.  The esophagus is  unremarkable.    CHEST WALL: No suspicious axillary lymphadenopathy.    ABDOMEN/PELVIS:  LIVER: No focal hepatic mass.    BILIARY: Normal appearance of the gallbladder. No intra- or  extrahepatic biliary dilation.    PANCREAS: No focal pancreatic mass or ductal dilation.    SPLEEN: No splenic mass.    ADRENAL GLANDS: Within normal limits.    URINARY TRACT: Mild retention of iodinated contrast in the renal  cortices. Partially exophytic intermediately attenuating lesion in the  midpole of the right kidney measuring up to 2.4 cm (series 9 image  310). No bladder wall thickening.    REPRODUCTIVE ORGANS: No suspicious pelvic mass.    STOMACH: Within normal limits.    BOWEL: Diffusely distended, fluid-filled small bowel. No pneumatosis  or portal venous gas.  Appendix is within normal limits.    PERITONEUM/FLUID: Mild portacaval and mid abdominal mesenteric  haziness. No focal fluid collection or evidence of free fluid or free  air.    VESSELS: No aneurysmal dilatation of the abdominal aorta.    LYMPH NODES: No lymphadenopathy.    BONES/SOFT TISSUES: Multiple bilateral nondisplaced anterolateral rib  fractures. No aggressive osseous lesions. Lower lumbar facet  arthropathy. Chronic right L5 pars interarticularis defect.      Impression    IMPRESSION:  1.  Multifocal linear and consolidative opacities throughout the lungs  most prominent in the right upper lobe which could represent  atelectasis, pulmonary hemorrhage, or infection/aspiration in the  context of recent cardiac arrest.  2.  Nodular consolidative opacities in the left lower lobe consistent  with aspiration pneumonitis versus ongoing postobstructive pneumonia  in the setting of a central left lower lobe broncholith (as seen on  2/21/2024 chest CT).  3.  Findings of pulmonary edema with trace bilateral pleural  effusions.  4.  Endotracheal tube tip with slight mainstem bronchus intubation,  recommend retraction  approximately 4 cm. Other supportive devices as  described above.   5.  Cardiomegaly with left atrial enlargement.  6.  Intermediately attenuating lesion measuring up to 2.4 cm in the  midpole of the right kidney is indeterminant on these noncontrast  images raises concern for renal cell carcinoma. Recommend further  characterization with renal mass protocol MRI when clinically  appropriate.  7.  Multiple bilateral nondisplaced anterolateral rib fractures  related to chest compressions.    [Access Center: Right mainstem intubation]    This report will be copied to the [facility] to ensure a  provider acknowledges the finding. Access Center is available Monday  through Friday 8am-3:30 pm.       [Consider Follow Up: Indeterminate right renal mass]    This report will be copied to the [facility] to ensure a  provider acknowledges the finding. Access Center is available Monday  through Friday 8am-3:30 pm.        I have personally reviewed the examination and initial interpretation  and I agree with the findings.    LIZBETH ADAMS DO         SYSTEM ID:  [id number]   US Carotid Bilateral    Narrative    ULTRASOUND CAROTID BILATERAL 7/30/2024 11:06 AM    CLINICAL HISTORY: Cardiac Arrest on ECMO    COMPARISON: None available.    REFERRING PROVIDER: TERENCE SERRA    TECHNIQUE: Grayscale (B-mode) and duplex and spectral Doppler  ultrasound of the common carotid, extracranial internal carotid,  external carotid, and vertebral artery origins. Velocity measurements  obtained with angle correction at or less than 60 degrees.    FINDINGS: Abnormal waveforms consistent with ECMO and aortic balloon  pump.    RIGHT SIDE:     Plaque Morphology: Minimal plaque.       Proximal CCA: 91/0 cm/s     Mid CCA: 72/0 cm/s     Distal CCA: 52/0 cm/s           External CA: 28/0 cm/s       Proximal ICA: 30/10 cm/s     Mid ICA: 63/20 cm/s     Distal ICA: 52/10 cm/s       Vertebral artery: Antethgthrthathdtheth:th th6th cm/s     ICA/CCA  ratio: 1.21     LEFT SIDE:     Plaque Morphology: Minimal plaque.        Proximal CCA: 69/0 cm/s     Mid CCA: 67/0 cm/s     Distal CCA: 75/0 cm/s           External CA: 40/0 cm/s       Proximal ICA: 43/0 cm/s     Mid ICA: 70/10 cm/s     Distal ICA: 42/5 cm/s       Vertebral artery: Antethgthrthathdtheth:th th2th9th cm/s     ICA/CCA ratio: 0.93       Impression    IMPRESSION:    1. RIGHT ICA: Less than 50% diameter narrowing by grayscale imaging  and sonographic velocity criteria.    2. LEFT ICA:  Less than 50% diameter narrowing by grayscale imaging  and sonographic velocity criteria.    [facility]reditation Commission Updated Recommendation for  Carotid Stenosis Interpretation Criteria - November 2023.  [email or web address]         Normal            ICA PSV: < 180 cm/s            Plaque Estimate: None            ICA/CCA PSV Ratio: < 2.0            ICA EDV: < 40 cm/s         < 50%            ICA PSV: < 180 cm/s            Plaque Estimate: < 50%            ICA/CCA PSV Ratio: < 2.0            ICA EDV: < 40 cm/s         50 - 69%            ICA PSV: 180 - 230 cm/s            Plaque Estimate: > 50%            ICA/CCA PSV Ratio: 2.0 - 4.0            ICA EDV: 40 - 100 cm/s      50 - 69%            ICA PSV: 125-180 cm/s            AND            ICA/CCA PSV Ratio: > or = 2.0         > 70% but less than near occlusion            ICA PSV: > 230 cm/s            Plaque Estimate: > 50%            AND either            ICA EDV: > 100 cm/s            or            ICA/CCA PSV Ratio: > 4.0         Near occlusion            ICA PSV: High, low, or undetectable            Plaque Estimate: Visible            ICA/CCA PSV Ratio: Variable            ICA EDV: Variable         Total occlusion            ICA PSV: Undetectable            Plaque Estimate: Visible, no detectable lumen            ICA/CCA PSV Ratio: N/A            ICA EDV:  N/A                                          Additional criteria from vascular surgery     > 80%       EDV > 120 cm/s    GISELL BUENROSTRO MD         SYSTEM ID:  [id number]   US Lower Extremity Arterial Duplex Bilateral    Narrative    ULTRASOUND LOWER EXTREMITY ARTERIAL DUPLEX BILATERAL  7/30/2024 11:12  AM    CLINICAL HISTORY: Baseline to assess blood flow to Lower Extremities  (VA ECMO).     COMPARISONS: None available.    REFERRING PROVIDER: TERENCE SERRA    TECHNIQUE: Bilateral leg arteries evaluated with color Doppler and  Doppler waveform ultrasound    FINDINGS:     RIGHT:       Common femoral artery: OBSCURED       Profundus femoral artery: OBSCURED         Superficial femoral artery, origin: OBSCURED       Superficial femoral artery, mid thigh: 97 cm/s, flat monophasic       Superficial femoral artery, distal thigh: 15 cm/s, flat  monophasic         Popliteal artery: 25 cm/s, flat monophasic         Posterior tibial artery, ankle: 20 cm/s, flat monophasic       Anterior tibial artery, ankle: 14 cm/s, flat monophasic    LEFT:       Common femoral artery: OBSCURED       Profundus femoral artery: OBSCURED         Superficial femoral artery, origin: OBSCURED       Superficial femoral artery, mid thigh: 65/0 cm/s, multiphasic       Superficial femoral artery, distal thigh: 47/0 cm/s, multiphasic         Popliteal artery: 49/0 cm/s, multiphasic         Posterior tibial artery, ankle: 38/0 cm/s, multiphasic       Anterior tibial artery, ankle: 33/0 cm/s, multiphasic      Impression    IMPRESSION:  1. 6.47 velocity ratio from the right superficial femoral artery in  the mid thigh to distal thigh. Color Doppler image suggests a  significant stenosis.    2. Dopplerable flow in bilateral anterior and posterior tibial  arteries at the ankles.    GISELL BUENROSTRO MD         SYSTEM ID:  [id number]   XR Chest Port 1 View    Narrative    Exam: XR CHEST PORT 1 VIEW, 7/30/2024 1:38 PM    Comparison: CT chest abdomen pelvis same  day    History: Check endotracheal tube placement and ECLS cannula placement.  DO NOT log-roll patient.  Place film under patient using patient  safety handling process.    Findings:  Single view of the chest. Inferior approach ECMO cannula tip projects  over the lower right brachiocephalic vein. Endotracheal tube tip  projects 2.0 cm above the ron. IABP marker projects over the  descending thoracic aorta approximately 1.5 cm above the ron.  Enteric tube sidehole projects over the gastroesophageal junction.  Trachea is midline. The heart is enlarged. Mild perihilar and  bibasilar opacities, better seen on same-day CT. No pneumothorax or  pleural effusion. The visualized upper abdomen is unremarkable. No  acute osseous abnormalities.      Impression    Impression:   1. Endotracheal tube tip projects 2.0 cm above the ron.  2. Enteric tube sidehole projects over the gastric esophageal  junction. Consider slight advancement.  3. Mild perihilar and bibasilar opacities, better seen on same day CT.  4. Cardiomegaly.    I have personally reviewed the examination and initial interpretation  and I agree with the findings.    KELBY MALDONADO MD         SYSTEM ID:  [id number]   XR Abdomen Port 1 View    Narrative    EXAMINATION:  XR ABDOMEN PORT 1 VIEW 7/30/2024     COMPARISON: Same day CT    HISTORY: OGT placement    TECHNIQUE: Frontal portable supine view of the abdomen.    FINDINGS: Partially visualized venous ECMO cannula is noted. Enteric  tube sidehole and tip below the left hemidiaphragm projecting over the  stomach. Intra-aortic balloon pump marker overlying the inferior  aspect of the L1 vertebral body. Persistent gaseous distention of the  transverse colon measuring 6.9 cm. Limited assessment for free air on  supine imaging. No pneumatosis or portal venous gas in the visualized  abdomen. Mild diffuse interstitial opacities in the lung bases. Mild  blunting of the costophrenic angles.      Impression     IMPRESSION:   Enteric tube sidehole and tip projecting over the stomach. Trace  interstitial pulmonary edema and bilateral pleural effusions.  Nonobstructive bowel gas pattern however there may be mild colonic  ileus.    I have personally reviewed the examination and initial interpretation  and I agree with the findings.    OBED LUNA MD         SYSTEM ID:  [id number]   Echocardiogram Complete   Result Value    LVEF  5-10% (severely reduced)    Narrative    [id number]  [id number]  [id number]  [id number]^MARYSE^TERENCE     [facility]  Echocardiography Laboratory  [address] [place] [id number]     Name: STEFAN CRUZ  MRN: [id number]  : 1965  Study Date: 2024 01:11 PM  Age: 59 yrs  Gender: Male  Patient Location: [facility]  Reason For Study: Cardiac Arrest, Cardiorespiratory Failure  Ordering Physician: TERENCE SERRA  Performed By: Astrid Davis     BSA: 1.9 m2  Height: 61 in  Weight: 214 lb  BP: 80/48 mmHg  ______________________________________________________________________________  Procedure  Complete Portable Echo Adult. Contrast Optison. Optison (NDC #2605-5428-91)  given intravenously. Patient was given 6 ml mixture of 3 ml Optison and 6 ml  saline. 3 ml wasted.  ______________________________________________________________________________  Interpretation Summary  Pt is on VA-ECMO support at 4.1 LPM  Left ventricular function is decreased. The ejection fraction is 5-10%  (severely reduced).  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 On contrast-enhanced imaging, there is  no evidence of LV thrombus.  The right ventricle is normal size. Global right ventricular function is  severely reduced.  Moderate aortic insufficiency is present.  No pericardial effusion is present.     This study was compared with the study from 3/31/2023. The left ventricular  function has significant decreased and the patient is now on  VA-ECMO.  ______________________________________________________________________________  Left Ventricle  Left ventricular function is decreased. The ejection fraction is 5-10%  (severely reduced). Left ventricular diastolic function is not assessable.  Severe diffuse hypokinesis is present. 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  On contrast-enhanced imaging, there is no evidence of LV thrombus.     Right Ventricle  The right ventricle is normal size. Global right ventricular function is  severely reduced.     Atria  The right atria appears normal. Moderate left atrial enlargement is present.     Mitral Valve  The mitral valve is normal. Mild mitral insufficiency is present.     Aortic Valve  The aortic valve is tricuspid. Moderate aortic insufficiency is present.     Tricuspid Valve  Trace tricuspid insufficiency is present. The right ventricular systolic  pressure is 7mmHg above the right atrial pressure.     Pulmonic Valve  The pulmonic valve cannot be assessed.     Vessels  The thoracic aorta cannot be assessed. ECMO cannula is present in the IVC.     Pericardium  No pericardial effusion is present.     Compared to Previous Study  This study was compared with the study from 3/31/2023 .  ______________________________________________________________________________  Doppler Measurements & Calculations  TR max sharifa: 129.5 cm/sec  TR max P.7 mmHg     ______________________________________________________________________________  Report approved by: Dr Nasir Rodriguez 2024 02:19 PM         XR Chest Port 1 View    Impression    RESIDENT PRELIMINARY INTERPRETATION  Impression:   1. Endotracheal tube terminates in the midthoracic trachea, slightly  retracted compared to prior. Otherwise stable support devices.  2. Increased diffuse mixed pulmonary opacities, greatest in the left  upper lung fields. Differential includes infection, edema, and/or  atelectasis.

## 2024-07-31 NOTE — PLAN OF CARE
Goal Outcome Evaluation:      Plan of Care Reviewed With: child    Overall Patient Progress: no changeOverall Patient Progress: no change    Outcome Evaluation: Discharge needs are not knonw at this time.  RNCC will cont to follow the plan of care.

## 2024-07-31 NOTE — PLAN OF CARE
Goal Outcome Evaluation:    D: Remained unresponsive w/ pupils fixed and dilated. Repeat head CT today. Neurology consulted. Not sedated. Hemodynamically labile, on epi/norepi/vaso to keep MAP > 65. ECMO and IABP no issue. UOP 30-60 ml/h, nephrology consulted, consent for CRRT obtained. No major skin issues noted, WOC follows. Airborne precautions, TB r/o. Elevated CK, continuous LR.   I: As above. Family updated at bedside. Pt status/lab work d/w CICU. [facility] updated.  A: Guarded.  P: Continue to monitor. Notify MD of changes/concerns. Pending family discussion/decision.

## 2024-07-31 NOTE — PROGRESS NOTES
[facility]    ECLS Shift Summary:     ECMO Equipment:  Console Serial Number: [id number]  Circuit Lot Number: [id number]  Oxygenator Lot Number: [id number]    Circuit Assessment: Fibrin  Fibrin Location: Connectors    Arterial ECMO Cannula: 17 Fr in the Right Femoral Artery  Venous ECMO Cannula: 25 Fr in the Right Femoral Vein  Distal Perfusion Catheter: 8 Fr in the Right Superficial Femoral Artery    ECMO Cannula Arterial Right femoral artery-Site Assessment: WDL, Secure, Sutured  ECMO Cannula Venous Right femoral vein-Site Assessment: WDL, Secure, Sutured  Distal Perfusion Catheter Right superficial femoral artery-Site Assessment: WDL, Sutured, Secure  ECMO Cannula Arterial Right femoral artery-Site Intervention: No intervention needed  ECMO Cannula Venous Right femoral vein-Site Intervention: No intervention needed  Distal Perfusion Catheter Right superficial femoral artery-Site Intervention: No intervention needed    Patient remains on V-A ECMO, all equipment is functioning and alarms are appropriately set. RPM's: 4000 with Blood Flow (Circuit) LPM  Av.3 LPM  Min: 4.22 LPM  Max: 4.42 LPM L/min. Sweep is at 4.5 LPM and 60 %. Extremities are warm and edematous.     Significant Shift Events:   -In and out of junctional rhythm throughout the night.  -Heparin off at 0400 due to rising ACTs despite titrating down.  -2L LR given intermittently.  Albumin ordered if more fluid is needed for chugging.    Vent settings:  Vent Mode: CMV/AC  FiO2 (%): 50 %  Resp Rate (Set): 10 breaths/min  Tidal Volume (Set, mL): 350 mL  PEEP (cm H2O): 7 cmH2O  Inspiratory Pressure (cm H2O) (Drager Cande): 20  Resp: 10      Anticoagulation:  OFF as of 0400.  Most recent: ACT  (seconds): 207 seconds    Urine output is 20mL/hr. Cloudy, Barbra.  No blood loss observed.   No product given included.  2L LR given throughout shift.     Intake/Output Summary (Last 24 hours) at 2024 0618  Last data  filed at 7/31/2024 0600  Gross per 24 hour   Intake 8768.59 ml   Output 2308 ml   Net 6460.59 ml       Labs:  Recent Labs   Lab 07/31/24  0603 07/31/24  0358 07/31/24  0158 07/30/24  2358   PH 7.40 7.37 7.35 7.32*   PCO2 34* 34* 34* 35   PO2 125* 149* 150* 183*   HCO3 21 20* 19* 18*   O2PER 60 60  60  50 60 60       Lab Results   Component Value Date    HGB 14.2 07/31/2024    PHGB 30 (H) 07/31/2024     (L) 07/31/2024    FIBR 328 07/31/2024    INR 1.64 (H) 07/31/2024    PTT 92 (H) 07/31/2024    DD 5.93 (H) 07/31/2024       Plan is to remain on VA ECMO support and repeat head CT.    Nasir Antonio, RT  ECMO Specialist  7/31/2024 6:18 AM

## 2024-07-31 NOTE — CONSULTS
Nephrology Initial Consult  July 31, 2024      Elias Solorzano MRN:[id number] YOB: 1965  Date of Admission:7/30/2024  Primary care provider: Rosa Elena Scott  Requesting physician: Omar Gilman MD    ASSESSMENT AND RECOMMENDATIONS:   Elias Solorzano is a 59 year old  male being past medical history significant for hypertension, hyperlipidemia, CAD status post PCI, tobacco abuse admitted to the CICU on 07/30/2024 s/p refractory VF cardiac arrest requiring VA ECMO, patient was cannulated by [facility] ([facility] [place]      #ATN 2/2 VF Cardiac arrest   #Refractory VF Cardiac arrest   #Shock requiring VA ECMO  #CAD  #Cardiomyopathy  #Dyslipidemia  Baseline cr is 0.9 per chart, on presentation his cr was 1.5 went up to 1.5>2>2.6>3>3.1 over 24 hrs   His other labs are relatively ok, K is 4.3, bicarb is 19.  He has shock liver with AST 1600>1100, CK of 10K , .Lactate 12 down to 4.8 now   He received one amp of bicarb and no diuresis   He is on 3 pressors vaso, levo and epi with MAPs in the 60s   I talked to his Son Jan and explained to him that at hte moment we do not need CRRT   But we consented him for dialysis in case he needs in the next 24 hrs (consent in chart)   His uo is picking up since he got here, he received total of 9 liter iver 2 days, has 158 ml of urine yesterday but better uo to 500 over 12 hrs today  -we will monitor for now his UO, can give lasix 120 mg if he becomes hyperkalemic or his UO drops below 50ml/hr  -trend his CK with AM labs   -can give free water flushes 200 Q4H for his hyperNa via NGT       #Rhabdomyolysis   CK is 10k  Trend CK with am labs (ordered)  -I would start LR at 100ml/hr and monitor his volume status, UO and I/Os to see if lasix is needed        Recommendations were communicated to primary team via note     Seen and discussed with Dr. Jossie Bae MD  Division of Renal Disease and Hypertension  [facility]  [employer]mail  Vocera Web Console        REASON FOR  CONSULT: ALYSHA     HISTORY OF PRESENT ILLNESS:  Elias Solorzano is a 59 year old  male being past medical history significant for hypertension, hyperlipidemia, CAD status post PCI, tobacco abuse admitted to the CICU on 07/30/2024 s/p refractory VF cardiac arrest requiring VA ECMO, patient was cannulated by mobile at [facility]    Baseline cr is 0.9 per chart, on presentation his cr was 1.5 went up to 1.5>2>2.6>3>3.1 over 24 hrs   His other labs are relatively ok, K is 4.3, bicarb is 19.  He has shock liver with AST 1600>1100, CK of 10K , .Lactate 12 down to 4.8 now   He received one amp of bicarb and no diuresis   He is on 3 pressors vaso, levo and epi with MAPs in the 60s       PAST MEDICAL HISTORY:  Reviewed with patient on 07/31/2024     No past medical history on file.    Past Surgical History:   Procedure Laterality Date    CV EXTRACORPERAL MEMBRANE OXYGENATION N/A 7/30/2024    Procedure: Extracorporeal Membrance Oxygenation;  Surgeon: Mike Augustine MD;  Location:  [facility] CARDI[facility] CATH LAB        MEDICATIONS:  PTA Meds  Prior to Admission medications    Medication Sig Last Dose Taking? Auth Provider Long Term End Date   albuterol (PROAIR HFA/PROVENTIL HFA/VENTOLIN HFA) 108 (90 Base) MCG/ACT inhaler Inhale 2 puffs into the lungs every 6 hours as needed for shortness of breath, wheezing or cough   Reported, Patient Yes    amLODIPine (NORVASC) 5 MG tablet Take 1 tablet by mouth daily   Reported, Patient Yes    ASPIRIN LOW DOSE 81 MG EC tablet Take 1 tablet by mouth daily   Reported, Patient     atorvastatin (LIPITOR) 80 MG tablet Take 1 tablet by mouth daily   Reported, Patient Yes    FLUoxetine (PROZAC) 40 MG capsule Take 1 capsule by mouth daily   Reported, Patient Yes    lisinopril (ZESTRIL) 20 MG tablet Take 1 tablet by mouth daily   Reported, Patient Yes    metFORMIN (GLUCOPHAGE XR) 500 MG 24 hr tablet Take 1 tablet by mouth daily   Reported, Patient Yes    metoprolol succinate  ER (TOPROL XL) 50 MG 24 hr tablet Take 1 tablet by mouth daily   Reported, Patient Yes    mometasone (ASMANEX TWISTHALER) 220 MCG/ACT inhaler Inhale 1 puff into the lungs every evening Unsure of dose   Reported, Patient Yes    nitroGLYcerin (NITROSTAT) 0.4 MG sublingual tablet DISSOLVE 1 PILL UNDER TONGUE EVERY 5 MINUTES AS NEEDED FOR CHEST PAIN/YOG MOB HAUV SIAB MUAB IB LUB TSHUAJ LOAN HAUV QAB NPLAIG TXHUA 5 JODI THIS   Reported, Patient Yes    traZODone (DESYREL) 50 MG tablet Take 1 tablet by mouth every other day   Reported, Patient Yes       Current Meds  Current Facility-Administered Medications   Medication Dose Route Frequency Provider Last Rate Last Admin    aspirin (ASA) chewable tablet 81 mg  81 mg Oral or Feeding Tube Daily Armando Garcia MD   81 mg at 07/31/24 0753    cefTRIAXone (ROCEPHIN) 2 g vial to attach to  ml bag for ADULTS or NS 50 ml bag for PEDS  2 g Intravenous Q24H Сергей Wolf NP   2 g at 07/31/24 1305    chlorhexidine (PERIDEX) 0.12 % solution 15 mL  15 mL Mouth/Throat Q12H Сергей Wolf NP   15 mL at 07/31/24 0753    pantoprazole (PROTONIX) IV push injection 40 mg  40 mg Intravenous Daily Сергей Wolf NP   40 mg at 07/31/24 0752     Infusion Meds  Current Facility-Administered Medications   Medication Dose Route Frequency Provider Last Rate Last Admin    amiodarone (NEXTERONE) 6 mg/mL in sodium chloride 0.9% in non-PVC container 250 mL MAX concentration CENTRAL line infusion  1 mg/min Intravenous Continuous Сергей Wolf NP 10 mL/hr at 07/31/24 1400 1 mg/min at 07/31/24 1400    dextrose 10% infusion   Intravenous Continuous PRN Сергей Wolf NP        EPINEPHrine (ADRENALIN) 16 mg in sodium chloride 0.9 % 250 mL infusion CENTRAL  0.03-0.3 mcg/kg/min (Dosing Weight) Intravenous Continuous Invasive, Cardiologist, MD 4.6 mL/hr at 07/31/24 1400 0.05 mcg/kg/min at 07/31/24 1400    heparin (porcine) 100 unit/mL in 0.45% Sodium Chloride ANTICOAGULANT  infusion  10-50 Units/kg/hr (Ideal) Other Continuous Wolf, Сергей, NP 6 mL/hr at 07/31/24 1400 600 Units/hr at 07/31/24 1400    heparin 2 unit/mL in 0.9% NaCl (for REPERFUSION CATHETER)  3 mL/hr Intravenous Continuous Сергей Wolf NP 3 mL/hr at 07/31/24 1400 3 mL/hr at 07/31/24 1400    insulin regular (MYXREDLIN) 1 unit/mL infusion  0-24 Units/hr Intravenous Continuous Сергей Wolf NP   Stopped at 07/31/24 0800    lactated ringers BOLUS 500 mL  500 mL Intravenous Continuous PRN Dejan Isbell MD   500 mL at 07/31/24 0528    norepinephrine (LEVOPHED) 16 mg in  mL infusion MAX CONC CENTRAL LINE  0.01-0.6 mcg/kg/min (Dosing Weight) Intravenous Continuous Сергей Wolf NP 5.5 mL/hr at 07/31/24 1400 0.06 mcg/kg/min at 07/31/24 1400    sodium chloride 0.9 % infusion   Intravenous Continuous Chay Garcias MD   Held at 07/30/24 1151    vasopressin 1 unit/mL MAX Conc (PITRESSIN) infusion  0.5-4 Units/hr Intravenous Continuous Invasive, Cardiologist, MD 3 mL/hr at 07/31/24 1400 3 Units/hr at 07/31/24 1400       ALLERGIES:    No Known Allergies    REVIEW OF SYSTEMS:  A comprehensive of systems was negative except as noted above.    SOCIAL HISTORY:   Social History     Socioeconomic History    Marital status:      Spouse name: Not on file    Number of children: Not on file    Years of education: Not on file    Highest education level: Not on file   Occupational History    Not on file   Tobacco Use    Smoking status: Every Day     Types: Cigarettes    Smokeless tobacco: Never   Vaping Use    Vaping status: Never Used   Substance and Sexual Activity    Alcohol use: Not on file    Drug use: Not on file    Sexual activity: Not on file   Other Topics Concern    Not on file   Social History Narrative    Not on file     Social Determinants of Health     Financial Resource Strain: Not on file   Food Insecurity: No Food Insecurity (2/16/2024)    [facility]  "Vital Sign     Worried About Running Out of Food in the Last Year: Never true     Ran Out of Food in the Last Year: Never true   Transportation Needs: No Transportation Needs (2024)    [facility]    PRAPARE - Transportation     Lack of Transportation (Medical): No     Lack of Transportation (Non-Medical): No   Physical Activity: Not on file   Stress: Not on file   Social Connections: Not on file   Interpersonal Safety: Unknown (2024)    [facility]    Humiliation, Afraid, Rape, and Kick questionnaire     Fear of Current or Ex-Partner: Not on file     Emotionally Abused: Not on file     Physically Abused: No     Sexually Abused: No   Housing Stability: Unknown (2024)    [facility]    Housing Stability Vital Sign     Unable to Pay for Housing in the Last Year: No     Number of Places Lived in the Last Year: Not on file     In the last 12 months, was there a time when you did not have a steady place to sleep or slept in a shelter (including now)?: No     Reviewed with patient    accompanies Elias Solorzano in hospital room    FAMILY MEDICAL HISTORY:   No family history on file.  Reviewed with patient     PHYSICAL EXAM:   Temp  Av.9  F (36.6  C)  Min: 94.8  F (34.9  C)  Max: 99  F (37.2  C)  Arterial Line BP  Min: 79/50  Max: 127/66  Arterial Line MAP (mmHg)  Av.8 mmHg  Min: 62 mmHg  Max: 94 mmHg      Pulse  Av.8  Min: 52  Max: 105 Resp  Av.6  Min: 0  Max: 16  FiO2 (%)  Av.2 %  Min: 50 %  Max: 60 %  SpO2  Av.6 %  Min: 95 %  Max: 100 %       Pulse 59   Temp 98.4  F (36.9  C)   Resp 10   Ht 1.575 m (5' 2\")   Wt 97.5 kg (214 lb 15.2 oz)   SpO2 99%   BMI 39.31 kg/m     Date 24 0700 - 24 0659   Shift 9680-2848 0034-7052 2181-5426 24 Hour Total   INTAKE   I.V. 687.33   687.33   NG/GT 60   60   Shift Total(mL/kg) 747.33(7.67)   747.33(7.67)   OUTPUT   Urine 350   350   Emesis/NG output 0   " 0   Stool 0   0   Shift Total(mL/kg) 350(3.59)   350(3.59)   Weight (kg) 97.5 97.5 97.5 97.5      Admit Weight: 97.5 kg (214 lb 15.2 oz)     General: critically ill, supine in bed, in NAD  HEENT: PERRL, no scleral icterus or injection  CARDIAC: RRR, no m/r/g appreciated. Peripheral pulses dopplered  RESP: synchronous with mechanical ventilation, CTAB, no wheezes, rhonchi or crackles appreciated.  GI: soft, non-distended, BS hypoactive  : Urinary catheter present  EXTREMITIES: NO LE edema, pulses 2+. Femoral access site w/o bleeding, dressing C/D/I.   SKIN: No acute lesions appreciated on exposed skin.  NEURO: Intubated and sedated. Unable to assess language or mentation. Unresponsive to noxious stim x 4 extremities.        LABS:   CMP  Recent Labs   Lab 07/31/24  1412 07/31/24  1252 07/31/24  1008 07/31/24  0954 07/31/24  0602 07/31/24  0358 07/30/24  2359 07/30/24  2144 07/30/24  1553 07/30/24  1549   NA  --   --   --  148*  --  147*  --  148*  --  150*   POTASSIUM  --   --   --  4.3  --  3.5  --  3.6  --  2.9*   CHLORIDE  --   --   --  115*  --  115*  --  115*  --  113*   CO2  --   --   --  19*  --  19*  --  18*  --  19*   ANIONGAP  --   --   --  14  --  13  --  15  --  18*   * 117* 114* 114*  119*   < > 137*  142*   < > 171*  161*  182*   < > 113*  116*   BUN  --   --   --  28.5*  --  26.5*  --  23.7*  --  20.0   CR  --   --   --  3.14*  --  3.01*  --  2.64*  --  2.11*   GFRESTIMATED  --   --   --  22*  --  23*  --  27*  --  35*   JOEY  --   --   --  7.9*  --  8.4*  --  8.7*  --  8.9   MAG  --   --   --  1.9  --  2.0  --  2.3  --  2.0   PHOS  --   --   --  4.0  --  1.0*  --   --   --   --    PROTTOTAL  --   --   --  4.6*  --  4.7*  --  4.8*  --  5.0*   ALBUMIN  --   --   --  3.0*  --  2.7*  --  2.8*  --  3.0*   BILITOTAL  --   --   --  1.4*  --  0.8  --  0.6  --  0.7   ALKPHOS  --   --   --  36*  --  41  --  46  --  50   AST  --   --   --  1,100*  --  1,648*  --  2,148*  --  2,464*   ALT  --   --    --  206*  --  281*  --  319*  --  341*    < > = values in this interval not displayed.     CBC  Recent Labs   Lab 07/31/24  0954 07/31/24  0358 07/30/24 2144 07/30/24  1549   HGB 12.7* 14.2 14.7 15.2   WBC 8.7 13.2* 14.6* 17.8*   RBC 4.04* 4.53 4.71 4.75   HCT 34.4* 39.5* 41.9 42.0   MCV 85 87 89 88   MCH 31.4 31.3 31.2 32.0   MCHC 36.9* 35.9 35.1 36.2   RDW 13.6 13.8 13.9 13.7   PLT 89* 121* 147* 177     INR  Recent Labs   Lab 07/31/24  0954 07/31/24  0358 07/30/24 2144 07/30/24  1549   INR 1.90* 1.64* 1.55* 1.51*   PTT 65* 92* 89* 63*     ABG  Recent Labs   Lab 07/31/24  1409 07/31/24  1247 07/31/24  0954 07/31/24  0734   PH 7.34* 7.35 7.39 7.44   PCO2 41 39 34* 31*   PO2 82 114* 129* 170*   HCO3 22 21 21 21   O2PER 40 40 50 50      URINE STUDIES  Recent Labs   Lab Test 07/30/24  0954   COLOR Orange*   APPEARANCE Slightly Cloudy*   URINEGLC 70*   URINEBILI Negative   URINEKETONE Negative   SG 1.015   UBLD Large*   URINEPH 6.0   PROTEIN 200*   NITRITE Negative   LEUKEST Negative   RBCU 14*   WBCU 10*     No lab results found.  PTH  No lab results found.  IRON STUDIES  No lab results found.    IMAGING:  All imaging studies reviewed by me.     Jose Bae MD

## 2024-07-31 NOTE — CONSULTS
[facility]  WO Nurse Inpatient Assessment     Consulted for: ECMO Pressure Injury Prevention    Patient History (according to provider note(s):      Elias Solorzano is a 59 year old  male being admitted to the CICU on 07/30/2024 s/p refractory VF cardiac arrest requiring VA ECMO, patient was cannulated by mobile at [facility] ([facility] The patient has an unknown past medical hx.     Assessment:      ECMO cannula location: Right groin ECMO cannula  Areas assessed: Face, Nose, Mouth, Ears, Occiput, Hands, Sacrum/Coccyx, Perineum, Knee, Feet, and Heels  Positioning tolerance: Good  Date of ECMO cannulation: 7/30 at [facility] via Mobile ECMO  Presence of ischemia: No  Location of ischemia: N/A  Pressure Injury Present::No  Pressure Injury Prevention Interventions In Place:  Optifoam Dressing under ECMO Cannula, Z flow Positioner under head, Pillows for repositioning, TAPs Wedge Positioners in use, Heel off-loading boots, Mepilex Sacral Dressing, and Dressing to sacrum for prevention        Pressure Injury Prevention Interventions Added:  None- all interventions currently in place        Treatment Plan:     ECMO pressure injury prevention plan:      Reposition patient every 1-2 hours using positioning wedges  Reposition head with each turn or every 2 hours using fluidized positioner, remold fluidized positioner with each reposition so that pressure is redistributed along the entire head/neck. Ensure head and neck are aligned in a neutral position without any cords or tubes resting under head or ears.   Pad ECMO IJ cannula with non adhesive foam dressing (#[id number]) along face and scalp between skin and cannula and under Coban head wraps    Pad ECMO groin and chest cannula under rigid connectors with non adhesive foam dressing or Soft cloth  Heel off-loading Boots at all times  Sacral silicon adhesive dressing for Prevention, change every 5 days and prn  Low Air loss  mattress      Orders: Written    RECOMMEND PRIMARY TEAM ORDER: None, at this time  Education provided: plan of care  Discussed plan of care with: Nurse  [facility] nurse follow-up plan: weekly  Notify WOC if wound(s) deteriorate.  Nursing to notify the Provider(s) and re-consult the [facility] Nurse if new skin concern.    DATA:     Current support surface: Standard  Low air loss (CHARLY pump, Isolibrium, Pulsate)  Containment of urine/stool: Incontinent pad in bed, Indwelling catheter, and Internal fecal management  BMI: Body mass index is 39.31 kg/m .   Active diet order: Orders Placed This Encounter      Advance Diet as Tolerated: Clear Liquid Diet     Output: I/O last 3 completed shifts:  In: 9018.59 [I.V.:5518.59; IV Piggyback:2500]  Out: 2308 [Urine:308; Emesis/NG output:350; Stool:1650]     Labs:   Recent Labs   Lab 07/31/24  0954 07/31/24  0358 07/30/24  1549 07/30/24  0923   ALBUMIN  --  2.7*   < > 3.4*   HGB 12.7* 14.2   < > 16.0   INR  --  1.64*   < >  --    WBC 8.7 13.2*   < > 12.8*   A1C  --   --   --  5.5    < > = values in this interval not displayed.     Pressure injury risk assessment:   Sensory Perception: 1-->completely limited  Moisture: 4-->rarely moist  Activity: 1-->bedfast  Mobility: 1-->completely immobile  Nutrition: 1-->very poor  Friction and Shear: 2-->potential problem  Brain Score: 10    Eun Rosario RN, CWOCN  Pager no longer is use, please contact through [employer] group: [facility] Nurse [place]  Dept. Office Number: [phone]

## 2024-07-31 NOTE — PROGRESS NOTES
[facility]    ECLS Shift Summary: 5121-8155     ECMO Equipment:  Console Serial Number: [id number]  Circuit Lot Number: [id number]  Oxygenator Lot Number: [id number]    Circuit Assessment: Fibrin  Fibrin Location: At connectors; some mobile    Arterial ECMO Cannula: 17 Fr in the Right Femoral Artery  Venous ECMO Cannula: 25 Fr in the Right Femoral Vein  Distal Perfusion Catheter: 8 Fr in the Right Superficial Femoral Artery    ECMO Cannula Arterial Right femoral artery-Site Assessment: WDL, Secure, Sutured  ECMO Cannula Venous Right femoral vein-Site Assessment: WDL, Secure, Sutured  Distal Perfusion Catheter Right superficial femoral artery-Site Assessment: WDL, Sutured, Secure  ECMO Cannula Arterial Right femoral artery-Site Intervention: No intervention needed  ECMO Cannula Venous Right femoral vein-Site Intervention: No intervention needed  Distal Perfusion Catheter Right superficial femoral artery-Site Intervention: No intervention needed    Patient remains on V-A ECMO, all equipment is functioning and alarms are appropriately set. RPM's: 3500 with Blood Flow (Circuit) Avg: 3.7 LPM (Min: 3.59 LPM  Max: 3.82 LPM). Sweep is at 3 LPM and 55 %. Extremities are warm/perfused.     Significant Shift Events:   Restarted heparin this morning due to ACT below goal. Head CT obtained around noon today. Patient did not have any chugging or need fluid today due to ECMO; just 1 unit of platelets administered by RN. Placed in airborne precautions this afternoon for possible TB.    Vent settings:  Vent Mode: PCV Plus assist  FiO2 (%): 60 %  Resp Rate (Set): 10 breaths/min  Tidal Volume (Set, mL): 350 mL  PEEP (cm H2O): 8 cmH2O  Inspiratory Pressure (cm H2O) (Drager Cande): 20  Resp: 10    Anticoagulation and Volume Status:  Dose (units/hr) HEParin: 800 Units/hr  Rate (mL/hr) HEParin: 8 mL/hr  Concentration HEParin: 100 Units/mL     Most recent: ACT  (seconds): 190 seconds (goal  180-200)    Urine output is increased today, but still low overall. Cloudy, Barbra, Sediment.  Blood loss was minimal. Product given included 1 unit platelets.    Intake/Output Summary (Last 24 hours) at 7/31/2024 1824  Last data filed at 7/31/2024 1800  Gross per 24 hour   Intake 5959.63 ml   Output 1030 ml   Net 4929.63 ml       Labs:  Recent Labs   Lab 07/31/24  1549 07/31/24  1409 07/31/24  1247 07/31/24  0954   PH 7.34* 7.34* 7.35 7.39   PCO2 41 41 39 34*   PO2 75* 82 114* 129*   HCO3 22 22 21 21   O2PER 60  55  60 40 40 50       Lab Results   Component Value Date    HGB 12.3 (L) 07/31/2024    PHGB 30 (H) 07/31/2024     (L) 07/31/2024    FIBR 362 07/31/2024    INR 1.90 (H) 07/31/2024    PTT 65 (H) 07/31/2024    DD 3.42 (H) 07/31/2024    ANTCH 53 (L) 07/31/2024     Plan:  Plan is to continue VA ECMO at this time.    Chloe Borges, RT  ECMO Specialist  7/31/2024 6:24 PM

## 2024-07-31 NOTE — PROGRESS NOTES
ECMO Attending Progress Note  2024    Elias Solorzano is a 59 year old male who was cannulated for ECMO 2024 due to refractory VF arrest    Cannulation Site:  17 Fr in the R femoral artery  25 Fr in the R femoral vein    Interval events: ongoing stability, no reflexes    Pulsatilty (IABP paused if applicable):0 mmHG     Physical Exam:  Temp:  [94.8  F (34.9  C)-99  F (37.2  C)] 98.1  F (36.7  C)  Pulse:  [] 62  Resp:  [0-16] 15  MAP:  [62 mmHg-94 mmHg] 75 mmHg  Arterial Line BP: ()/(46-67) 106/55  FiO2 (%):  [50 %-60 %] 50 %  SpO2:  [95 %-100 %] 100 %    Intake/Output Summary (Last 24 hours) at 2024 0920  Last data filed at 2024 0820  Gross per 24 hour   Intake 9070.69 ml   Output 2343 ml   Net 6727.69 ml      Vent Mode: CMV/AC  FiO2 (%): 50 %  Resp Rate (Set): 10 breaths/min  Tidal Volume (Set, mL): 350 mL  PEEP (cm H2O): 7 cmH2O  Inspiratory Pressure (cm H2O) (Drager Cande): 20  Resp: 15    Labs:  Recent Labs   Lab 24  0734 24  0603 24  0358 24  0158   PH 7.44 7.40 7.37 7.35   PCO2 31* 34* 34* 34*   PO2 170* 125* 149* 150*   HCO3 21 21 20* 19*   O2PER 50 60 60  60  50 60      Recent Labs   Lab 24  0358 24  2144 24  1549 24  0923   WBC 13.2* 14.6* 17.8* 12.8*   HGB 14.2 14.7 15.2 16.0     Creatinine   Date Value Ref Range Status   2024 3.01 (H) 0.67 - 1.17 mg/dL Final   2024 2.64 (H) 0.67 - 1.17 mg/dL Final   2024 2.11 (H) 0.67 - 1.17 mg/dL Final   2024 1.53 (H) 0.67 - 1.17 mg/dL Final   Blood Flow (Circuit) LPM: 3.65 LPM  Sweep LPM: (S) 3.5 LPM  Sweep FiO2   %: (S) 55 %  ACT  (seconds): 165 seconds  Pulse Oximetry  (SpO2%): 100 %  Arterial Pressure  mmH mmHg    ECMO Issues including assessments and plan on DOS 2024:  Neuro: Sedated for mechanical ventilation and ECMO.  No acute distress.  NIRS stable b/l  RASS goal: -3.  Pupils fixed and dilated  CV: Cardiogenic shock.  Hemodynamically stable on low dose  norepi, epi, and vaso  Pulm: Keep vent settings at rest settings as above.  FEN/Renal: Electrolytes stable w/ replacement protocols in place, Cr stable, UOP stable  Heme: ACT goal: 180-200, Hemoglobin stable .  Minimal oozing around the ECMO cannulas.  ID: Receiving empiric antibiotics  Cannulae: Position is acceptable on exam and the available imaging.  Distal perfusion cannula is in place and patent.  Extremities are well-perfused.     I have personally reviewed the ECMO flows, oxygenation and CO2 clearance, anticoagulation, and cannula position.  I have also personally assessed the patient's systemic response with hemodynamics, oxygenation, ventilation, and bleeding.       The patient requires continued ECMO support and management in the ICU.  I have discussed patient care and treatment plan with the primary team.      Omar Gilman MD, PhD  Interventional/Critical Care Cardiology  [phone]    July 31, 2024

## 2024-07-31 NOTE — PROGRESS NOTES
Overnight no acute events.    Neuro: Pupils remain fixed and dilated, no cough or gag.    Cardiac: Heart rate 50's-60's between sinus rhythm and junctional/accelerated junctional. BP improving with fluids. ECMO needed 2.5L LR and 1L albumin for chugging. Flow is down to 3.5 from 4.5, O2 @ 60%. Sweep 4. IABP functioning appropriately.     Respiratory: no cough, no changes to the vent.     GI: Liquid stool through rectal tube. OG @ 80.    : Urine output improved overnight with fluid.     Lactic coming down, INR going up,

## 2024-07-31 NOTE — CONSULTS
Care Management Initial Consult    General Information  Assessment completed with: Children, dtr (Phachee) and dtr in-law ( See)  Type of CM/SW Visit: Initial Assessment    Primary Care Provider verified and updated as needed: Yes   Readmission within the last 30 days: no previous admission in last 30 days         Advance Care Planning: Advance Care Planning Reviewed: no concerns identified        Communication Assessment  Patient's communication style:  Pt is vented and sedated      Cognitive  Cognitive/Neuro/Behavioral: .WDL except, all  Level of Consciousness: unresponsive  Arousal Level: unresponsive  Orientation: other (see comments) (SHAR)  Mood/Behavior: behavior appropriate to situation  Best Language:  (SHAR)  Speech: endotracheal tube    Living Environment:   People in home: child(justin), adult     Current living Arrangements:        Able to return to prior arrangements: yes     Family/Social Support:  Care provided by: self  Provides care for: no one  Marital Status:   Children          Description of Support System: Supportive, Involved       Current Resources:   Patient receiving home care services: No     Community Resources: None  Equipment currently used at home: none  Supplies currently used at home: Other (Inhaler)    Employment/Financial:  Employment Status: unemployed        Financial Concerns: none   Referral to Financial Worker: No     Does the patient's insurance plan have a 3 day qualifying hospital stay waiver?  No    Lifestyle & Psychosocial Needs:  Social Determinants of Health     Food Insecurity: No Food Insecurity (2/16/2024)    Received from [employer]    Hunger Vital Sign     Worried About Running Out of Food in the Last Year: Never true     Ran Out of Food in the Last Year: Never true   Depression: Not on file   Housing Stability: Unknown (2/16/2024)    Received from [employer] [facility]    Housing Stability Vital Sign     Unable to Pay for Housing in the Last Year:  No     Number of Places Lived in the Last Year: Not on file     In the last 12 months, was there a time when you did not have a steady place to sleep or slept in a shelter (including now)?: No   Tobacco Use: High Risk (3/8/2024)    Patient History     Smoking Tobacco Use: Every Day     Smokeless Tobacco Use: Never     Passive Exposure: Not on file   Financial Resource Strain: Not on file   Alcohol Use: Not on file   Transportation Needs: No Transportation Needs (2/16/2024)    Received from [employer]    PRAPARE - Transportation     Lack of Transportation (Medical): No     Lack of Transportation (Non-Medical): No   Physical Activity: Not on file   Interpersonal Safety: Unknown (2/16/2024)    [facility] [employer] [email or web address]    Humiliation, Afraid, Rape, and Kick questionnaire     Fear of Current or Ex-Partner: Not on file     Emotionally Abused: Not on file     Physically Abused: No     Sexually Abused: No   Stress: Not on file   Social Connections: Not on file   Health Literacy: Not on file       Functional Status:  Prior to admission patient needed assistance:   Dependent ADLs:: Independent  Dependent IADLs:: Independent       Mental Health Status:  Mental Health Status: No Current Concerns       Chemical Dependency Status:  Chemical Dependency Status: No Current Concerns           Values/Beliefs:  Spiritual, Cultural Beliefs, Spiritism Practices, Values that affect care:               Additional Information:  RNCC met pt yulietrDayna and dtr in-law, See to introduce RNCC role and complete elevated risk readmission assessment.  Pt family stated the team have been updating them well about the plan of care.  Pt is  and lives with his Childrens.  Pt has 5 Childrens, one son lives in [place] and the other 4 Childrens lives in [place].  Pt was ind. with all cares and mobility prior hospitalization.  Pt was not receiving any home care or community care service.    RNCC discussed about TCU vs home with home  care vs home with OP rehab discharge options.  RNCC informed pt family that RNCC/SW will cont. to follow the plan of care and assist with the discharge planning.  Pt family agreed.    RNCC shared info. about discount parking rates options.      Yuni Villaseñor, RN, PHN, BSN  4A and 4E/ ICU  Care Coordinator  Phone: [phone]  Pager: [phone]      SEARCHABLE in [facility] - search CARE COORDINATOR       [place] (3630-7973) Saturday & Sunday; (1513-2234) [facility] Recognized Holidays     Units: 5A Onc Vocera & 5C Vocera Pager: [phone]    Units: 6B Vocera & 6C Vocera  Pager: [phone]    Units: 7A SOT RNCC Vocera, 7B Med Surg Vocera, & 7C Med Surg Vocera  Pager: [phone]    Units: 6A Vocera & 4A CVICU Vocera, 4C MICU Vocera, and 4E SICU Vocera   Pager: [phone]    Units: 5 Ortho Vocera & 5 Med Surg Vocera  Pager: [phone]    Units: 6 Med Surg Vocera & 8 Med Surg Vocera  Pager [phone]

## 2024-08-01 NOTE — PROGRESS NOTES
Shift Summary [id number]    Major Shift Events:    Nuclear medicine exam for brain death testing completed. Time of death 08/01/24 at 1545. Family notified, [facility] notified. Per [facility], patient not a candidate. Comfort care measures started to support family. Pending care withdrawal when family is ready.       For vital signs and complete assessments, please see documentation flowsheets.

## 2024-08-01 NOTE — PROGRESS NOTES
Nephrology progress note  08/01/2024     Elias Solorzano MRN:[id number] YOB: 1965  Date of Admission:7/30/2024  Primary care provider: Rosa Elena Scott  Requesting physician: Omar Gilman MD    ASSESSMENT AND RECOMMENDATIONS:   Elias Solorzano is a 59 year old  male being past medical history significant for hypertension, hyperlipidemia, CAD status post PCI, tobacco abuse admitted to the CICU on 07/30/2024 s/p refractory VF cardiac arrest requiring VA ECMO, patient was cannulated by [facility] ([facility]      #ATN 2/2 VF Cardiac arrest   #Refractory VF Cardiac arrest   #Shock requiring VA ECMO  #CAD  #Cardiomyopathy  #Dyslipidemia  Baseline cr is 0.9 per chart, on presentation his cr was 1.5 went up to 1.5>2>2.6>3>3.1 over 24 hrs   His other labs are relatively ok, K is 4.3, bicarb is 19.  He has shock liver with AST 1600>1100, CK of 10K , .Lactate 12 down to 4.8 now   He received one amp of bicarb and no diuresis   He is on 3 pressors vaso, levo and epi with MAPs in the 60s   I talked to his Son Jan and explained to him that at hte moment we do not need CRRT   But we consented him for dialysis in case he needs in the next 24 hrs (consent in chart)   His uo is picking up since he got here, he received total of 9 liter iver 2 days, has 158 ml of urine yesterday but better uo to 700 over 12 hrs today  -we will monitor for now his UO, pending brain death eval   -agree with stopping LR as CK is down to 4000  -agree with keeping him slightly hypernatremic 148-150 in the setting of diffuse brain edema on CT   -the decision now to start dialysis is more to do with his brain death eval results and whether family opt for organ donation if he is declared brain dead (dialysis has a role in optimizing vitality of organs for donation)           Recommendations were communicated to primary team via note     Seen and discussed with Dr. Jossie Bae MD  Division of Renal Disease and  Hypertension  Amcom  [email or web address]  Vocera Web Console        REASON FOR CONSULT: ATN     Interval events   CT showed diffuse brain edema   Brain death eval today  Weight up from 97.5 to 104.3kg  Good uo off lasix 700 ml since midnight   Still on 3 pressors     PAST MEDICAL HISTORY:  Reviewed with patient on 08/01/2024     No past medical history on file.    Past Surgical History:   Procedure Laterality Date    CV EXTRACORPERAL MEMBRANE OXYGENATION N/A 7/30/2024    Procedure: Extracorporeal Membrance Oxygenation;  Surgeon: Mike Augustine MD;  Location:  [facility]        MEDICATIONS:  PTA Meds  Prior to Admission medications    Medication Sig Last Dose Taking? Auth Provider Long Term End Date   albuterol (PROAIR HFA/PROVENTIL HFA/VENTOLIN HFA) 108 (90 Base) MCG/ACT inhaler Inhale 2 puffs into the lungs every 6 hours as needed for shortness of breath, wheezing or cough   Reported, Patient Yes    amLODIPine (NORVASC) 5 MG tablet Take 1 tablet by mouth daily   Reported, Patient Yes    ASPIRIN LOW DOSE 81 MG EC tablet Take 1 tablet by mouth daily   Reported, Patient     atorvastatin (LIPITOR) 80 MG tablet Take 1 tablet by mouth daily   Reported, Patient Yes    FLUoxetine (PROZAC) 40 MG capsule Take 1 capsule by mouth daily   Reported, Patient Yes    lisinopril (ZESTRIL) 20 MG tablet Take 1 tablet by mouth daily   Reported, Patient Yes    metFORMIN (GLUCOPHAGE XR) 500 MG 24 hr tablet Take 1 tablet by mouth daily   Reported, Patient Yes    metoprolol succinate ER (TOPROL XL) 50 MG 24 hr tablet Take 1 tablet by mouth daily   Reported, Patient Yes    mometasone (ASMANEX TWISTHALER) 220 MCG/ACT inhaler Inhale 1 puff into the lungs every evening Unsure of dose   Reported, Patient Yes    nitroGLYcerin (NITROSTAT) 0.4 MG sublingual tablet DISSOLVE 1 PILL UNDER TONGUE EVERY 5 MINUTES AS NEEDED FOR CHEST PAIN/YOG MOB HAUV SIAB MUAB IB LUB TSHUAJ LOAN HAUV QAB NPLAIG TXHUA 5 JODI THIS   Reported, Patient  Yes    traZODone (DESYREL) 50 MG tablet Take 1 tablet by mouth every other day   Reported, Patient Yes       Current Meds  Current Facility-Administered Medications   Medication Dose Route Frequency Provider Last Rate Last Admin    aspirin (ASA) chewable tablet 81 mg  81 mg Oral or Feeding Tube Daily Armando Garcia MD   81 mg at 08/01/24 0743    cefTRIAXone (ROCEPHIN) 2 g vial to attach to  ml bag for ADULTS or NS 50 ml bag for PEDS  2 g Intravenous Q24H Сергей Wolf NP   2 g at 08/01/24 1210    chlorhexidine (PERIDEX) 0.12 % solution 15 mL  15 mL Mouth/Throat Q12H Сергей Wolf NP   15 mL at 08/01/24 0743    pantoprazole (PROTONIX) IV push injection 40 mg  40 mg Intravenous Daily Сергей Wolf NP   40 mg at 08/01/24 0743     Infusion Meds  Current Facility-Administered Medications   Medication Dose Route Frequency Provider Last Rate Last Admin    amiodarone (NEXTERONE) 6 mg/mL in sodium chloride 0.9% in non-PVC container 250 mL MAX concentration CENTRAL line infusion  0.5 mg/min Intravenous Continuous Dejan Isbell MD 5 mL/hr at 08/01/24 1211 0.5 mg/min at 08/01/24 1211    dextrose 10% infusion   Intravenous Continuous PRN Сергей Wolf NP        EPINEPHrine (ADRENALIN) 16 mg in sodium chloride 0.9 % 250 mL infusion CENTRAL  0.03-0.3 mcg/kg/min (Dosing Weight) Intravenous Continuous Сергей Wolf NP 5.5 mL/hr at 08/01/24 1200 0.06 mcg/kg/min at 08/01/24 1200    heparin (porcine) 100 unit/mL in 0.45% Sodium Chloride ANTICOAGULANT infusion  10-50 Units/kg/hr (Ideal) Other Continuous Сергей Wolf NP 8 mL/hr at 08/01/24 1200 800 Units/hr at 08/01/24 1200    heparin 2 unit/mL in 0.9% NaCl (for REPERFUSION CATHETER)  3 mL/hr Intravenous Continuous Сергей Wolf NP 3 mL/hr at 08/01/24 1200 3 mL/hr at 08/01/24 1200    insulin regular (MYXREDLIN) 1 unit/mL infusion  0-24 Units/hr Intravenous Continuous Сергей Wolf NP   Stopped at 07/31/24 0800     lactated ringers BOLUS 500 mL  500 mL Intravenous Continuous PRN Dejan Isbell MD   500 mL at 07/31/24 0528    lactated ringers infusion   Intravenous Continuous Сергей Wolf NP   Stopped at 08/01/24 0800    norepinephrine (LEVOPHED) 16 mg in  mL infusion MAX CONC CENTRAL LINE  0.01-0.6 mcg/kg/min (Dosing Weight) Intravenous Continuous Сергей Wolf NP 5.5 mL/hr at 08/01/24 1200 0.06 mcg/kg/min at 08/01/24 1200    sodium chloride 0.9 % infusion   Intravenous Continuous Chay Garcias MD   Held at 07/30/24 1151    vasopressin 1 unit/mL MAX Conc (PITRESSIN) infusion  0.5-4 Units/hr Intravenous Continuous Сергей Wolf NP 3 mL/hr at 08/01/24 1200 3 Units/hr at 08/01/24 1200       ALLERGIES:    No Known Allergies    REVIEW OF SYSTEMS:  A comprehensive of systems was negative except as noted above.    SOCIAL HISTORY:   Social History     Socioeconomic History    Marital status:      Spouse name: Not on file    Number of children: Not on file    Years of education: Not on file    Highest education level: Not on file   Occupational History    Not on file   Tobacco Use    Smoking status: Every Day     Types: Cigarettes    Smokeless tobacco: Never   Vaping Use    Vaping status: Never Used   Substance and Sexual Activity    Alcohol use: Not on file    Drug use: Not on file    Sexual activity: Not on file   Other Topics Concern    Not on file   Social History Narrative    Not on file     Social Determinants of Health     Financial Resource Strain: Not on file   Food Insecurity: No Food Insecurity (2/16/2024)    Received from [facility]    Hunger Vital Sign     Worried About Running Out of Food in the Last Year: Never true     Ran Out of Food in the Last Year: Never true   Transportation Needs: No Transportation Needs (2/16/2024)    Received from [facility]    PRAPARE - Transportation     Lack of Transportation (Medical): No     Lack of Transportation (Non-Medical): No  "  Physical Activity: Not on file   Stress: Not on file   Social Connections: Not on file   Interpersonal Safety: Unknown (2024)    Received from [facility]    Humiliation, Afraid, Rape, and Kick questionnaire     Fear of Current or Ex-Partner: Not on file     Emotionally Abused: Not on file     Physically Abused: No     Sexually Abused: No   Housing Stability: Unknown (2024)    [facility]    Housing Stability Vital Sign     Unable to Pay for Housing in the Last Year: No     Number of Places Lived in the Last Year: Not on file     In the last 12 months, was there a time when you did not have a steady place to sleep or slept in a shelter (including now)?: No     Reviewed with patient    accompanies Elias Solorzano in hospital room    FAMILY MEDICAL HISTORY:   No family history on file.  Reviewed with patient     PHYSICAL EXAM:   Temp  Av.9  F (36.6  C)  Min: 94.8  F (34.9  C)  Max: 99  F (37.2  C)  Arterial Line BP  Min: 79/50  Max: 127/66  Arterial Line MAP (mmHg)  Av.8 mmHg  Min: 62 mmHg  Max: 94 mmHg      Pulse  Av.8  Min: 52  Max: 105 Resp  Av.6  Min: 0  Max: 16  FiO2 (%)  Av.2 %  Min: 50 %  Max: 60 %  SpO2  Av.6 %  Min: 95 %  Max: 100 %       Pulse 64   Temp 98.6  F (37  C)   Resp 10   Ht 1.575 m (5' 2\")   Wt 104.3 kg (230 lb)   SpO2 98%   BMI 42.07 kg/m     Date 24 07 - 24 0659   Shift 0432-8330 5737-3550 8373-7866 24 Hour Total   INTAKE   I.V. 687.33   687.33   NG/GT 60   60   Shift Total(mL/kg) 747.33(7.67)   747.33(7.67)   OUTPUT   Urine 350   350   Emesis/NG output 0   0   Stool 0   0   Shift Total(mL/kg) 350(3.59)   350(3.59)   Weight (kg) 97.5 97.5 97.5 97.5      Admit Weight: 97.5 kg (214 lb 15.2 oz)     General: critically ill, supine in bed, in NAD  HEENT: PERRL, no scleral icterus or injection  CARDIAC: RRR, no m/r/g appreciated. Peripheral pulses dopplered  RESP: synchronous with mechanical " ventilation, CTAB, no wheezes, rhonchi or crackles appreciated.  GI: soft, non-distended, BS hypoactive  : Urinary catheter present  EXTREMITIES: NO LE edema, pulses 2+. Femoral access site w/o bleeding, dressing C/D/I.   SKIN: No acute lesions appreciated on exposed skin.  NEURO: Intubated and sedated. Unable to assess language or mentation. Unresponsive to noxious stim x 4 extremities.        LABS:   CMP  Recent Labs   Lab 08/01/24  1215 08/01/24  0952 08/01/24  0748 08/01/24  0536 08/01/24  0526 08/01/24  0409 08/01/24  0402 07/31/24  2153 07/31/24  2148 07/31/24  1555 07/31/24  1549 07/31/24  1008 07/31/24  0954 07/31/24  0602 07/31/24  0358   NA  --  148*  --   --   --   --  148*  --  149*  --  148*  --  148*  --  147*   POTASSIUM  --  4.3  --   --   --   --  4.9  --  4.8  --  5.0  --  4.3  --  3.5   CHLORIDE  --  114*  --   --   --   --  114*  --  115*  --  116*  --  115*  --  115*   CO2  --  22  --   --   --   --  21*  --  21*  --  21*  --  19*  --  19*   ANIONGAP  --  12  --   --   --   --  13  --  13  --  11  --  14  --  13   * 123*  130* 129*   < >  --    < > 129*  135*   < > 113*  118*   < > 109*  114*   < > 114*  119*   < > 137*  142*   BUN  --  39.7*  --   --   --   --  36.8*  --  33.5*  --  30.9*  --  28.5*  --  26.5*   CR  --  3.72*  --   --   --   --  3.67*  --  3.48*  --  3.30*  --  3.14*  --  3.01*   GFRESTIMATED  --  18*  --   --   --   --  18*  --  19*  --  21*  --  22*  --  23*   JOEY  --  8.2*  --   --   --   --  8.4*  --  8.3*  --  8.5*  --  7.9*  --  8.4*   MAG  --  2.0  --   --   --   --  2.0  --  2.0  --  2.0  --  1.9  --  2.0   PHOS  --   --   --   --  4.6*  --  4.9*  --   --   --   --   --  4.0  --  1.0*   PROTTOTAL  --  4.7*  --   --   --   --  4.9*  --  4.8*  --  4.8*  --  4.6*  --  4.7*   ALBUMIN  --  2.8*  --   --   --   --  3.0*  --  3.0*  --  3.1*  --  3.0*  --  2.7*   BILITOTAL  --  2.0*  --   --   --   --  1.5*  --  1.4*  --  1.3*  --  1.4*  --  0.8   ALKPHOS  --  55   --   --   --   --  46  --  40  --  37*  --  36*  --  41   AST  --  712*  --   --   --   --  737*  --  795*  --  903*  --  1,100*  --  1,648*   ALT  --  164*  --   --   --   --  168*  --  179*  --  189*  --  206*  --  281*    < > = values in this interval not displayed.     CBC  Recent Labs   Lab 08/01/24  0952 08/01/24  0402 07/31/24 2148 07/31/24  1549   HGB 11.4* 11.6* 12.2* 12.3*   WBC 8.9 10.2 10.4 7.6   RBC 3.60* 3.71* 3.80* 3.89*   HCT 31.9* 32.8* 32.8* 34.1*   MCV 89 88 86 88   MCH 31.7 31.3 32.1 31.6   MCHC 35.7 35.4 37.2* 36.1   RDW 14.2 14.4 14.2 14.0   PLT 95* 103* 92* 100*     INR  Recent Labs   Lab 08/01/24  0952 08/01/24  0402 07/31/24 2148 07/31/24  1549   INR 1.81* 1.92* 2.01* 1.90*   PTT 81* 95* 101* 65*     ABG  Recent Labs   Lab 08/01/24  0952 08/01/24  0749 08/01/24  0526 08/01/24  0402   PH 7.40 7.38 7.36 7.34*   PCO2 37 39 40 42   PO2 58* 83 123* 115*   HCO3 23 23 23 23   O2PER 80 80 100 60  100  100      URINE STUDIES  Recent Labs   Lab Test 07/30/24  0954   COLOR Orange*   APPEARANCE Slightly Cloudy*   URINEGLC 70*   URINEBILI Negative   URINEKETONE Negative   SG 1.015   UBLD Large*   URINEPH 6.0   PROTEIN 200*   NITRITE Negative   LEUKEST Negative   RBCU 14*   WBCU 10*     No lab results found.  PTH  No lab results found.  IRON STUDIES  No lab results found.    IMAGING:  All imaging studies reviewed by me.     Jose Bae MD

## 2024-08-01 NOTE — DEATH PRONOUNCEMENT
Significant Event Note    Description of event:  Examination for Death by Neurological Criteria,  'Brain Death'    Exam:  B/P: on VA ECMO MAP 80, T: 98.6, P: 64, R: 10  No spontaneous breathing above low ventilator settings  No significant derangement of labs that would preclude testing.  Irreversible coma of known cause.  History and imaging supporting of brain death.    Time of Exam: 08/01/24 12:30 pm  No response to voice or noxious stimuli  Pupils fixed (NPI 0 bilaterally) and dilated with no response to light (R: 4mm, L: 4mm)  No eye movement with vestibulocochlear reflex testing (Doll's eyes)  No corneal reflex bilaterally  No eye movements with cold caloric testing to either ear (5 minute delay between ears being tested)  No cough/gag to stimulus of either tonsillar pillar  No motor response to noxious stimuli in face or any extremity    Unable to perform apnea testing due to concerns for medication effects and hemodynamic instability    Nuclear medicine brain perfusion scan performed as ancillary testing to confirm death by neurological criteria (brain death), results finalized per the audit trail in PACS by attending at 15:45.    Time of death  08/01/24, 15:45    Rahel Riggs MD (Jo)  Neurocritical Care  Date of Service (when I saw the patient): 08/01/24

## 2024-08-01 NOTE — PROGRESS NOTES
ICU End of Shift Summary. See flowsheets for vital signs and detailed assessment.    Major Changes: Pt hypoxic w/ PO2's 50-55. Ventilator increased from 60% to 90%, Peep increased from 8-12 w/ PO2 improvement to 115.    Neuro: No gag, pupils fixed, NPI 0, CTH showed early cerebellar herniation  Cardiac: Afebrile, MAPs > 65 on 0.06 levo, 0.06 Epi and 3 Vaso. SB-SR w/ prolonged P wave. No ectopy so Amio was halved from 1mg to 0.5mg. ECMO w/o chugging overnight. 60% Sweep3, Flowing > 3.5, on 100mL/hr. IABP w/o alarms, minimal improvement to pulsatility, now 10-15 pt difference.   Respiratory: PC 80%, RR 10, Peep 12, PIPs 28-32, small clear pink sputum from ETT. L lung nesha out on CT chest. Lungs course throughout.   GI/: BS not audible, minimal stool output, cordero w/ 60-90mL/hr.   Skin: Chest abrasion from TITO, L groin IABP, R groin ECMO.   Drips: Levo 0.06, Epi 0.06, Vaso 3, Amio 0.5, Heparin 800  Labs: PO2 improved from 55 to >100, LA downtrending 2.9. , Cr worsening 3.67.    Plan: Continue POC, wean as able. Awaiting neuro consult.        Joaquin Taylor RN  August 1, 2024      Problem: ECLS (Extracorporeal Life Support)  Goal: Optimal Coping with Therapy  Outcome: Progressing  Intervention: Optimize Psychosocial Response  Recent Flowsheet Documentation  Taken 8/1/2024 0400 by Joaquin Taylor RN  Family/Support System Care:   caregiver stress acknowledged   support provided  Taken 8/1/2024 0000 by Joaquin Taylor, RN  Family/Support System Care:   caregiver stress acknowledged   support provided  Taken 7/31/2024 2000 by Joaquin Taylor, RN  Family/Support System Care:   caregiver stress acknowledged   support provided

## 2024-08-01 NOTE — PROGRESS NOTES
[facility]    ECLS Shift Summary:  1561-4099     ECMO Equipment:  Console Serial Number: [id number]  Circuit Lot Number: [id number]  Oxygenator Lot Number: [id number]    Circuit Assessment: Fibrin  Fibrin Location: Connectors    Arterial ECMO Cannula: 17 Fr in the Right Femoral Artery  Venous ECMO Cannula: 25 Fr in the Right Femoral Vein  Distal Perfusion Catheter: 8 Fr in the Right Superficial Femoral Artery    ECMO Cannula Arterial Right femoral artery-Site Assessment: WDL, Secure, Sutured  ECMO Cannula Venous Right femoral vein-Site Assessment: WDL, Sutured, Secure  Distal Perfusion Catheter Right superficial femoral artery-Site Assessment: WDL, Sutured, Secure  ECMO Cannula Arterial Right femoral artery-Site Intervention: No intervention needed  ECMO Cannula Venous Right femoral vein-Site Intervention: No intervention needed  Distal Perfusion Catheter Right superficial femoral artery-Site Intervention: No intervention needed    Patient remains on V-A ECMO, all equipment is functioning and alarms are appropriately set. RPM's: 3500 with Blood Flow (Circuit) LPM  Avg: 3.7 LPM  Min: 3.61 LPM  Max: 3.78 LPM L/min. Sweep is at 3 LPM and 60 %. Extremities are warm and edematous.     Significant Shift Events: At the beginning of the shift patient's PaO2 was in the mid 50s.  Vent FiO2 turned up to 100% and PEEP turned up to 12 and we are now in goal.  Patient's  ECMO post oxy pO2 at 0400 was 261.  Tried to turn down ECMO FiO2 from 60% to 50% and patient immediately desaturated to high 80s.  FiO2 turned back to 60% and post oxy blood gas not redrawn.    Vent settings:  Vent Mode: PCV Plus assist  FiO2 (%): 80 %  Resp Rate (Set): 10 breaths/min  Tidal Volume (Set, mL): 350 mL  PEEP (cm H2O): 12 cmH2O  Inspiratory Pressure (cm H2O) (Drager Cande): 20  Resp: 10      Anticoagulation:  Dose (units/hr) HEParin: 800 Units/hr  Rate (mL/hr) HEParin: 8 mL/hr  Concentration HEParin: 100  Units/mL      Most recent: ACT  (seconds): 199 seconds    Urine output is 80 mL/hr. Cloudy, Barbra, Sediment.  Blood loss was minimal.   Product given included 1 unit of Plts.   Patient receiving continuous LR at 100 mL/hr.     Intake/Output Summary (Last 24 hours) at 8/1/2024 0635  Last data filed at 8/1/2024 0600  Gross per 24 hour   Intake 3571.77 ml   Output 1755 ml   Net 1816.77 ml       Labs:  Recent Labs   Lab 08/01/24  0526 08/01/24  0402 08/01/24  0201 07/31/24  2348   PH 7.36 7.34* 7.33* 7.37   PCO2 40 42 44 36   PO2 123* 115* 87 99   HCO3 23 23 23 21   O2PER 100 60  100  100 100 100       Lab Results   Component Value Date    HGB 11.6 (L) 08/01/2024    PHGB 30 (H) 07/31/2024     (L) 08/01/2024    FIBR 468 08/01/2024    INR 1.92 (H) 08/01/2024    PTT 95 (H) 08/01/2024    DD 3.05 (H) 08/01/2024    ANTCH 53 (L) 07/31/2024       Plan is to remain on VA ECMO support.    Nasir Antonio, RT  ECMO Specialist  8/1/2024 6:35 AM

## 2024-08-01 NOTE — PROGRESS NOTES
Cardiology ICU Progress Note    HPI:  Elias Solorzano is a 59 year old  male being admitted to the CICU on 07/30/2024 s/p refractory VF cardiac arrest requiring [facility] ECMO, [place] [facility] [place] [place] medical hx significant for CAD, HLD, HTN, tobacco use.    Witnessed arest (Y/N): N  Home or public location (Y/N): home  Bystander CPR (Y/N): N  Time to compressions: 5-10 mins estimated  Time to cannulation: 61 mins (0508 dispatch time, 0609 cannulation)  Initial rhythm: VF  Did they have intermittent ROSC (Y/N): N  # of shocks: 4  Epi: 3 mg  Amio: 450 mg    Mod disease, no culprit lesion, no stents. IABP placed in CCL here at the .     Subjective and Interval:  -no acute events overnight, received platelets overnight     Assessment and plan by system:  ==Today's changes ==  - stop LR, give some diuretics,   - brain death testing   - decrease product goals    == Neurology ==   #Concern for anoxic brain injury    -Intubated, sedated. Avoiding hyperthermia  -continuous vEEG     CT head shows Findings consistent with diffuse hypoxic ischemic injury.  No transtentorial herniation at this time.     Current sedation:  RASS ([place] Agitation-Sedation Scale): -5-->unarousable    Plan:  -Continue sedation with a RASS goal -2 to 3  -Spontaneous awakening trial as tolerated  -Permissive hypercapnea and hypernatremia for neuroprotection    == Cardiovascular ==  #Refractory VF Cardiac arrest   #Shock requiring [facility] ECMO  #CAD  #Cardiomyopathy  #Dyslipidemia  Peripheral V-A ECMO inserted via LCFA and LCFV   Angiogram:   2V CAD with 40% ISR of proximal LAD stent which tapers down to a small caliber distal LAD with moderate disease; 30% ISR of proximal D1 stent; proximal Lcx with mild disease with a large caliber OM2 without significant disease, small caliber distal Lcx with moderate disease; dominant RCA with moderate disease in mid segment and 50% ISR of proximal rPDA stent at the bifurcation with  PL.  No culprit lesion for VT/VF identified.   Uncomplicated L femoral access.  TTE: pending  ECG Rhythm: Sinus rhythm  ECMO :  Mode: V-A   Pump Type: Cardiohelp  RPM's: 3500  Blood Flow (Circuit) LPM: 3.61 LPM  Sweep LPM: 3 LPM  Sweep FiO2   %: 60 %  Venous Pressure  mmHg: -53 mmHg  Arterial Pressure  mmH mmHg  Internal Pressure mmH mmHg  Pressure Change mmH mmHg    Current Pressors/inotropes/antiarrythmic:  Dose (mcg/kg/min) Norepinephrine: 0.4 mcg/kg/min, Dose (units/hr) Vasopressin: 4 Units/hr, and Dose (mcg/kg/min) Epinephrine: 0.1 mcg/kg/min    Hemodynamics:  Art Line  Arterial Line BP: 101/48  Arterial Line MAP (mmHg): 73 mmHg  Arterial Line Location: Right radial  Art Line Wave Form: Appropriate wave forms    Invasive Hemodynamic Monitoring:                            Plan:  -Continue ASA 81mg and ticagrelor 90mg BID   -Hold lipitor for now given shock liver  -Hold ACE/ARB for now given likely reduced renal fxn after arrest  -Hold beta blocker given shock  -Telemetry monitoring  -Trend troponin   -Continue ECMO support  -Continue IABP  -FBG even, hold diuresis    == Pulmonary ==  #Acute hypoxemic respiratory failure requiring intubation  #Probable aspiration pneumonia  #rib fractures  Vent Settings:   Vent Mode: PCV Plus assist  FiO2 (%): 80 %  Resp Rate (Set): 10 breaths/min  Tidal Volume (Set, mL): 350 mL  PEEP (cm H2O): 12 cmH2O  Inspiratory Pressure (cm H2O) (Drager Cande): 20  Resp: 10    ABG:   Recent Labs   Lab 24  0526 24  0402 24  0201   PH 7.36 7.34* 7.33*   PCO2 40 42 44   PO2 123* 115* 87   HCO3 23 23 23   O2PER 100 60  100  100 100     CXR: pending  CT CAP no evidence of bleeding, cannula in stable position, ETT in appropriate  position.     Plan:  -Wean vent as able  -Daily CXR  -Serial ABGs   -Consider scheduled duonebs if signs of lung dz, currently PRN    -Peridex  -FBG and diuresis as above        == Gastrointestinal, Nutrition ==  #Concern for Shock  liver 2/2 cardiac arrest  #Hypoalbuminemia   No known medical hx.     CT CAP above  Recent Labs   Lab 08/01/24  0402 07/31/24 2148 07/31/24  1549   * 795* 903*   * 179* 189*   BILITOTAL 1.5* 1.4* 1.3*   ALBUMIN 3.0* 3.0* 3.1*   PROTTOTAL 4.9* 4.8* 4.8*   ALKPHOS 46 40 37*     Diet NPO in immediate post arrest setting    Plan:  -Monitor LFTs  -Bowel regimen in place  -GI Prophylaxis: PPI; Protonix 40 mg daily     == Renal, Electrolytes ==  #Concern for Acute Renal Injury 2/2 cardiac arrest  #Lactic acidosis  #Rhabdomyolysis  #ATN  No intake or output data in the 24 hours ending 07/30/24 0803  Recent Labs   Lab 08/01/24  0526 08/01/24 0402 07/31/24 2148 07/31/24  1549 07/31/24  0954   NA  --  148* 149* 148* 148*   POTASSIUM  --  4.9 4.8 5.0 4.3   CHLORIDE  --  114* 115* 116* 115*   CO2  --  21* 21* 21* 19*   BUN  --  36.8* 33.5* 30.9* 28.5*   CR  --  3.67* 3.48* 3.30* 3.14*   PHOS 4.6* 4.9*  --   --  4.0   MAG  --  2.0 2.0 2.0 1.9     Plan:  -Avoid nephrotoxins, renally dose meds  -Monitor urine output  -FBG and diuresis as above    == Infectious Disease ==  #Aspiration Pneumonia- in the setting of arrest.  CXR/CAP as above.   #Leukocytosis  Recent Labs   Lab 08/01/24  0402 07/31/24 2148 07/31/24  1549   WBC 10.2 10.4 7.6     No data recorded.    Cultures thus far:  Pending     Antibiotics  Anti-infectives (From now, onward)      Start     Dose/Rate Route Frequency Ordered Stop    07/30/24 1000  cefTRIAXone (ROCEPHIN) 2 g vial to attach to  ml bag for ADULTS or NS 50 ml bag for PEDS         2 g  over 30 Minutes Intravenous EVERY 24 HOURS 07/30/24 0852 08/04/24 1159                       Plan:  -Continue CTX x 5D for aspiration coverage  -Monitor for signs of infection  -Avoid fevers     == Hematology ==  # Anemia of critical illness  Recent Labs   Lab 08/01/24  0402 07/31/24  2148 07/31/24  1549   HGB 11.6* 12.2* 12.3*   HCT 32.8* 32.8* 34.1*   * 92* 100*     Recent Labs   Lab  08/01/24  0402 07/31/24  2148 07/31/24  1549   INR 1.92* 2.01* 1.90*   PTT 95* 101* 65*     Hgb stable. No e/o bleeding  Receiving heparin for ECMO with ACT goal 140-160  Dose (units/hr) HEParin: 800 Units/hr  ACT  (seconds): 199 seconds  US LE w/ arterial duplex pending  DVT PPX: Heparin as above    Plan:  -Continue Heparin with ACT goal as above  -Transfusion parameters:   -1u PRBC for hbg < 7.0   -1u platelet for plt < 50   -1u FFP for INR > 3   -5u cryoprecipitate for fibrinogen <150     == Endocrinology ==  #Hyperglycemia   Hgb a1c   Recent Labs   Lab 07/30/24  0923   A1C 5.5     Recent Labs   Lab 08/01/24  0536 08/01/24  0409 08/01/24  0402 08/01/24  0206   * 131* 129*  135* 128*   Dose (units/hr) Insulin: 0 Units/hr    Plan  -Insulin infusion as needed    == Integumentary ==  -No skin issues  -CTM cannula sites    == Lines/Tubes/Drains ==  Peripheral IV 07/30/24 Anterior;Distal;Right Upper arm (Active)   Site Assessment [facility] 08/01/24 0400   Line Status Saline locked 08/01/24 0400   Dressing Transparent 08/01/24 0400   Dressing Status clean;dry;intact 08/01/24 0400   Line Intervention Flushed 07/31/24 2000   Phlebitis Scale 0-->no symptoms 08/01/24 0400   Infiltration? no 08/01/24 0400   Number of days: 2       Peripheral IV 07/30/24 Anterior;Distal;Left Upper arm (Active)   Site Assessment [facility] 08/01/24 0400   Line Status Saline locked 08/01/24 0400   Dressing Transparent 08/01/24 0400   Dressing Status clean;dry;intact 08/01/24 0400   Line Intervention Flushed 07/31/24 2000   Phlebitis Scale 0-->no symptoms 08/01/24 0400   Infiltration? no 08/01/24 0400   Number of days: 2       Arterial Line 07/30/24 Wrist (Active)   Site Assessment [facility] 08/01/24 0400   Line Status Pulsatile blood flow 08/01/24 0400   Arterine Line Cap Change Due 08/04/24 07/31/24 2000   Art Line Waveform Appropriate 08/01/24 0400   Art Line Interventions Leveled;Connections checked and tightened;Line pulled back 08/01/24 0400    Color/Movement/Sensation Capillary refill less than 3 sec 08/01/24 0400   Line Necessity Yes, meets criteria 08/01/24 0400   Dressing Type Transparent 08/01/24 0400   Dressing Status Clean, dry, intact 08/01/24 0400   Dressing Intervention Dressing changed/new dressing 07/30/24 1200   Dressing Change Due 08/06/24 08/01/24 0400   Number of days: 2       Arterial Sheath  07/30/24 Femoral (Active)   Specific Qualities Stat Locked;Sutured 08/01/24 0400   Site Assessment WDL 08/01/24 0400   Dressing Type Biopatch;Transparent 08/01/24 0400   Dressing Intervention Dressing changed/new dressing 07/30/24 1200   Arterial Sheath Comment IABP 08/01/24 0400   Number of days: 2       CVC Triple Lumen Left Femoral Non - valved (open ended) (Active)   Site Assessment WDL 08/01/24 0400   External Cath Length (cm) 0 cm 07/30/24 1600   Dressing Chlorhexidine disk;Transparent 08/01/24 0400   Dressing Status clean;dry;intact 08/01/24 0400   Dressing Intervention dressing reinforced 07/31/24 2300   Dressing Change Due 08/06/24 07/31/24 1600   Line Necessity Yes, meets criteria 08/01/24 0400   Blue - Status infusing 08/01/24 0400   Blue - Cap Change Due 08/03/24 07/31/24 2000   Blue - Intervention Flushed 07/30/24 1200   Brown - Status infusing 08/01/24 0400   Brown - Cap Change Due 08/03/24 07/31/24 2000   Brown - Intervention Flushed 07/30/24 1200   White - Status infusing 08/01/24 0400   White - Cap Change Due 08/03/24 07/31/24 2000   White - Intervention Flushed 07/30/24 1200   Phlebitis Scale 0-->no symptoms 08/01/24 0400   Infiltration? no 08/01/24 0400   CVC Comment 3L CVC 08/01/24 0400   Number of days:        ECMO Cannula Arterial Right femoral artery (Active)   Site Assessment WDL;Secure;Sutured 08/01/24 0600   Skin Assessment Clean;Dry;Intact 08/01/24 0600   Dressing Type Ioban;Silverlon 08/01/24 0600   Dressing Status clean;dry;intact 08/01/24 0600   Site Intervention No intervention needed 08/01/24 0600   Number of days:         ECMO Cannula Venous Right femoral vein (Active)   Site Assessment WDL;Sutured;Secure 08/01/24 0600   Skin Assessment Clean;Dry;Intact 08/01/24 0600   Dressing Type Silverlon;Ioban 08/01/24 0600   Dressing Status clean;dry;intact 08/01/24 0600   Site Intervention No intervention needed 08/01/24 0600   Number of days:        Distal Perfusion Catheter Right superficial femoral artery (Active)   Site Assessment WDL;Sutured;Secure 08/01/24 0600   Dressing Type Silverlon;Ioban 08/01/24 0600   Dressing Status clean;dry;intact 08/01/24 0600   Site Intervention No intervention needed 08/01/24 0600   Number of days:        ETT Cuffed 7 mm (Active)   Secured at (cm) 24 cm 08/01/24 0400   Measured from Lips 08/01/24 0400   Position Right 08/01/24 0400   Secured by Commercial tube novoa 08/01/24 0400   Bite Block None Present 08/01/24 0400   Site Appearance Clean;Dry 08/01/24 0400   Tube Care Site care done 08/01/24 0400   Cuff Assessment Minimal leak technique 07/31/24 1617   Safety Measures Manual resuscitator at bedside 08/01/24 0400   Number of days: 2       NG/OG/NJ Tube Orogastric 16 fr Center mouth (Active)   Site Description WDL 08/01/24 0400   Status Suction-low intermittent 08/01/24 0400   Drainage Appearance Brown 07/31/24 2000   Placement Confirmation [place] unchanged 08/01/24 0400   [place] (cm marking) at nare/mouth 78 cm 08/01/24 0400   Intake (ml) 30 ml 08/01/24 0500   Flush/Free Water (mL) 30 mL 08/01/24 0500   Container Amount 0 mL 08/01/24 0400   Output (ml) 0 ml 07/31/24 2000   Number of days: 2       Rectal Tube With balloon (Active)   Balloon fill volume  35 cc 07/31/24 0000   Stool Leakage None 08/01/24 0300   Rectal Tube Container Volume 0 ml 08/01/24 0300   Rectal Tube Output 0 ml 08/01/24 0300   Number of days: 2       Urethral Catheter 07/30/24 16 fr (Active)   Tube Description Positional 08/01/24 0400   Catheter Care Done;Catheter wipes 07/31/24 2100   Collection Container Standard 08/01/24  "0400   Securement Method Securing device (Describe) 08/01/24 0400   Rationale for Continued Use ICU only: hourly urine output needed for patient care 08/01/24 0400   Urine Output 80 mL 08/01/24 0600   Number of days: 2     ICU Checklist  Feeding: hold  Analgesia: hold  Sedation: hold   Thrombopx: heparin infusion  Head of bed: reverse trend  Ulcers: PPI  Glucose: insulin infusion  SBT: no  Bowel regimen: ordered  Indwelling cath: yes, needed  De-escalate meds:   Code Status: Full Code , VA ECMO candidate   Family updated by team. Patient seen and discussed with staff physician, Dr. Ceferino Sanchez.    Сергей Wolf APRN, CNP  Critical Care Cardiology  Securely message with the Vocera Web Console (learn more here)    Objective:  Most recent vital signs:  Pulse 65   Temp 98.6  F (37  C)   Resp 10   Ht 1.575 m (5' 2\")   Wt 104.3 kg (230 lb)   SpO2 98%   BMI 42.07 kg/m    Temp:  [98.1  F (36.7  C)-98.6  F (37  C)] 98.6  F (37  C)  Pulse:  [57-67] 65  Resp:  [10-15] 10  MAP:  [61 mmHg-291 mmHg] 73 mmHg  Arterial Line BP: ()/(41-62) 101/48  FiO2 (%):  [50 %-80 %] 80 %  SpO2:  [90 %-100 %] 98 %    Physical exam:  General: critically ill, supine in bed, in NAD  HEENT: Pupils fixed by pupillometer, no scleral icterus or injection  CARDIAC: RRR, no m/r/g appreciated. Peripheral pulses dopplered  RESP: synchronous with mechanical ventilation, CTAB, no wheezes, rhonchi or crackles appreciated.  GI: soft, non-distended, BS hypoactive  : Urinary catheter present  EXTREMITIES: NO LE edema, pulses 2+. Femoral access site w/o bleeding, dressing C/D/I.   SKIN: No acute lesions appreciated on exposed skin.  NEURO: Intubated and sedated. Unable to assess language or mentation. Unresponsive to noxious stim x 4 extremities. No cough. No gag. Not over breathing vent.     Labs:  Results for orders placed or performed during the hospital encounter of 07/30/24 (from the past 24 hour(s))   Activated clotting time celite, " POCT   Result Value Ref Range    Activated Clotting Time (Celite) POCT 165 (H) 74 - 150 seconds   Blood gas arterial   Result Value Ref Range    pH Arterial 7.39 7.35 - 7.45    pCO2 Arterial 34 (L) 35 - 45 mm Hg    pO2 Arterial 129 (H) 80 - 105 mm Hg    FIO2 50     Bicarbonate Arterial 21 21 - 28 mmol/L    Base Excess/Deficit Arterial -3.7 (L) -3.0 - 3.0 mmol/L    Ollie's Test Artline     Oxyhemoglobin Arterial 98 92 - 100 %    O2 Sat, Arterial >100.0 (H) 96.0 - 97.0 %    Peep 8 cm H2O    Narrative    In healthy individuals, oxyhemoglobin (O2Hb) and oxygen saturation (SO2) are approximately equal. In the presence of dyshemoglobins, oxyhemoglobin can be considerably lower than oxygen saturation.   Calcium ionized whole blood   Result Value Ref Range    Calcium Ionized Whole Blood 4.4 4.4 - 5.2 mg/dL   CBC with platelets   Result Value Ref Range    WBC Count 8.7 4.0 - 11.0 10e3/uL    RBC Count 4.04 (L) 4.40 - 5.90 10e6/uL    Hemoglobin 12.7 (L) 13.3 - 17.7 g/dL    Hematocrit 34.4 (L) 40.0 - 53.0 %    MCV 85 78 - 100 fL    MCH 31.4 26.5 - 33.0 pg    MCHC 36.9 (H) 31.5 - 36.5 g/dL    RDW 13.6 10.0 - 15.0 %    Platelet Count 89 (L) 150 - 450 10e3/uL   Comprehensive metabolic panel   Result Value Ref Range    Sodium 148 (H) 135 - 145 mmol/L    Potassium 4.3 3.4 - 5.3 mmol/L    Carbon Dioxide (CO2) 19 (L) 22 - 29 mmol/L    Anion Gap 14 7 - 15 mmol/L    Urea Nitrogen 28.5 (H) 8.0 - 23.0 mg/dL    Creatinine 3.14 (H) 0.67 - 1.17 mg/dL    GFR Estimate 22 (L) >60 mL/min/1.73m2    Calcium 7.9 (L) 8.8 - 10.4 mg/dL    Chloride 115 (H) 98 - 107 mmol/L    Glucose 119 (H) 70 - 99 mg/dL    Alkaline Phosphatase 36 (L) 40 - 150 U/L    AST 1,100 (HH) 0 - 45 U/L     (H) 0 - 70 U/L    Protein Total 4.6 (L) 6.4 - 8.3 g/dL    Albumin 3.0 (L) 3.5 - 5.2 g/dL    Bilirubin Total 1.4 (H) <=1.2 mg/dL   Glucose whole blood   Result Value Ref Range    Glucose 114 (H) 70 - 99 mg/dL   Heparin Unfractionated Anti Xa Level   Result Value Ref  Range    Anti Xa Unfractionated Heparin <0.10 For Reference Range, See Comment IU/mL    Narrative    Therapeutic Range: UFH: 0.25-0.50 IU/mL for low intensity dosing,  0.30-0.70 IU/mL for high intensity dosing DVT and PE.  This test is not validated for other direct factor X inhibitors (e.g. rivaroxaban, apixaban, edoxaban, betrixaban, fondaparinux) and should not be used for monitoring of other medications.   INR   Result Value Ref Range    INR 1.90 (H) 0.85 - 1.15   Lactic acid whole blood   Result Value Ref Range    Lactic Acid 2.6 (H) 0.7 - 2.0 mmol/L   Magnesium   Result Value Ref Range    Magnesium 1.9 1.7 - 2.3 mg/dL   Partial thromboplastin time   Result Value Ref Range    aPTT 65 (H) 22 - 38 Seconds   Troponin T, High Sensitivity   Result Value Ref Range    Troponin T, High Sensitivity >10,000 (HH) <=22 ng/L   Phosphorus   Result Value Ref Range    Phosphorus 4.0 2.5 - 4.5 mg/dL   Activated clotting time celite, POCT   Result Value Ref Range    Activated Clotting Time (Celite) POCT 173 (H) 74 - 150 seconds   Glucose by meter   Result Value Ref Range    GLUCOSE BY METER POCT 114 (H) 70 - 99 mg/dL   CONDITIONAL Prepare pheresed platelets (unit)   Result Value Ref Range    Blood Component Type Platelets     Product Code [id number]     Unit Status Transfused     Unit Number [id number]     CODING SYSTEM [id number]     ISSUE DATE AND TIME [id number]     UNIT ABO/RH A+     UNIT TYPE ISBT 6200    Activated clotting time celite, POCT   Result Value Ref Range    Activated Clotting Time (Celite) POCT 178 (H) 74 - 150 seconds   CT Head w/o Contrast    Narrative    EXAM: CT HEAD W/O CONTRAST  7/31/2024 12:14 PM     HISTORY:  r/o herniation, f/u anoxic brain injury       COMPARISON:  Head CT 7/30/2024.    TECHNIQUE: Using multidetector thin collimation helical acquisition  technique, axial, coronal and sagittal CT images from the skull base  to the vertex were obtained without intravenous contrast.   (topogram)  image(s) also obtained and reviewed.    FINDINGS:  Diffuse abnormal hypoattenuation of the cerebral and cerebellar  parenchyma with associated diffuse loss of gray-white differentiation.  Effacement of the third and fourth ventricles. Partial effacement of  the lateral ventricles. No midline shift. Complete effacement of the  basal cisterns. Early bilateral cerebellar tonsillar herniation.    Multiple external EEG leads. The bony calvaria and the bones of the  skull base are normal. Mild paranasal sinus mucosal thickening.  Mastoid air cells are clear. Grossly normal orbits.       Impression    IMPRESSION: Diffuse cerebral and cerebellar edema secondary to diffuse  anoxic injury. Complete effacement of the basal cisterns and early  cerebellar tonsillar herniation.    GEOFF MARCIAL MD         SYSTEM ID:  [id number]   Ionized Calcium   Result Value Ref Range    Calcium Ionized Whole Blood 4.3 (L) 4.4 - 5.2 mg/dL   Blood gas arterial   Result Value Ref Range    pH Arterial 7.35 7.35 - 7.45    pCO2 Arterial 39 35 - 45 mm Hg    pO2 Arterial 114 (H) 80 - 105 mm Hg    FIO2 40     Bicarbonate Arterial 21 21 - 28 mmol/L    Base Excess/Deficit Arterial -4.0 (L) -3.0 - 3.0 mmol/L    Ollie's Test Artline     Oxyhemoglobin Arterial 97 92 - 100 %    O2 Sat, Arterial 99.2 (H) 96.0 - 97.0 %    Peep 8 cm H2O    Narrative    In healthy individuals, oxyhemoglobin (O2Hb) and oxygen saturation (SO2) are approximately equal. In the presence of dyshemoglobins, oxyhemoglobin can be considerably lower than oxygen saturation.   Glucose by meter   Result Value Ref Range    GLUCOSE BY METER POCT 117 (H) 70 - 99 mg/dL   Activated clotting time celite, POCT   Result Value Ref Range    Activated Clotting Time (Celite) POCT 178 (H) 74 - 150 seconds   Blood gas arterial   Result Value Ref Range    pH Arterial 7.34 (L) 7.35 - 7.45    pCO2 Arterial 41 35 - 45 mm Hg    pO2 Arterial 82 80 - 105 mm Hg    FIO2 40     Bicarbonate Arterial 22 21 - 28  mmol/L    Base Excess/Deficit Arterial -3.8 (L) -3.0 - 3.0 mmol/L    Ollie's Test Artline     Oxyhemoglobin Arterial 95 92 - 100 %    O2 Sat, Arterial 97.0 96.0 - 97.0 %    Peep 8 cm H2O    Narrative    In healthy individuals, oxyhemoglobin (O2Hb) and oxygen saturation (SO2) are approximately equal. In the presence of dyshemoglobins, oxyhemoglobin can be considerably lower than oxygen saturation.   Glucose by meter   Result Value Ref Range    GLUCOSE BY METER POCT 117 (H) 70 - 99 mg/dL   Blood gas arterial   Result Value Ref Range    pH Arterial 7.34 (L) 7.35 - 7.45    pCO2 Arterial 41 35 - 45 mm Hg    pO2 Arterial 75 (L) 80 - 105 mm Hg    FIO2 60     Bicarbonate Arterial 22 21 - 28 mmol/L    Base Excess/Deficit Arterial -3.5 (L) -3.0 - 3.0 mmol/L    Ollie's Test Artline     Oxyhemoglobin Arterial 94 92 - 100 %    O2 Sat, Arterial 96.4 96.0 - 97.0 %    Peep 8 cm H2O    Narrative    In healthy individuals, oxyhemoglobin (O2Hb) and oxygen saturation (SO2) are approximately equal. In the presence of dyshemoglobins, oxyhemoglobin can be considerably lower than oxygen saturation.   Blood gas ELS arterial   Result Value Ref Range    pH ELS Arterial 7.34 (L) 7.35 - 7.45    pCO2 ELS Arterial 41 35 - 45 mm Hg    pO2 ELS Arterial 112 (H) 80 - 105 mm Hg    Bicarbonate ELS Arterial 22 21 - 28 mmol/L    Base Excess/Deficit Arterial -3.6 (L) -3.0 - 3.0 mmol/L    FIO2 55     Oxyhemoglobin ELS Arterial 97 75 - 100 %    O2 Saturation ELS Arterial 99.1 (H) 96.0 - 97.0 %    Narrative    In healthy individuals, oxyhemoglobin (O2Hb) and oxygen saturation (SO2) are approximately equal. In the presence of dyshemoglobins, oxyhemoglobin can be considerably lower than oxygen saturation.   Blood gas ELS venous   Result Value Ref Range    pH ELS Venous 7.31 (L) 7.32 - 7.43    pCO2 ELS Venous 47 40 - 50 mm Hg    pO2 ELS Venous 36 25 - 47 mm Hg    Bicarbonate ELS Venous 24 21 - 28 mmol/L    Base Excess/Deficit Venous -2.8 -3.0 - 3.0 mmol/L     FIO2 60     Oxyhemoglobin ELS Venous 69 %    O2 Saturation ELS Venous 70.4 70.0 - 75.0 %    Narrative    In healthy individuals, oxyhemoglobin (O2Hb) and oxygen saturation (SO2) are approximately equal. In the presence of dyshemoglobins, oxyhemoglobin can be considerably lower than oxygen saturation.   Calcium ionized whole blood   Result Value Ref Range    Calcium Ionized Whole Blood 4.8 4.4 - 5.2 mg/dL   CBC with platelets   Result Value Ref Range    WBC Count 7.6 4.0 - 11.0 10e3/uL    RBC Count 3.89 (L) 4.40 - 5.90 10e6/uL    Hemoglobin 12.3 (L) 13.3 - 17.7 g/dL    Hematocrit 34.1 (L) 40.0 - 53.0 %    MCV 88 78 - 100 fL    MCH 31.6 26.5 - 33.0 pg    MCHC 36.1 31.5 - 36.5 g/dL    RDW 14.0 10.0 - 15.0 %    Platelet Count 100 (L) 150 - 450 10e3/uL   Comprehensive metabolic panel   Result Value Ref Range    Sodium 148 (H) 135 - 145 mmol/L    Potassium 5.0 3.4 - 5.3 mmol/L    Carbon Dioxide (CO2) 21 (L) 22 - 29 mmol/L    Anion Gap 11 7 - 15 mmol/L    Urea Nitrogen 30.9 (H) 8.0 - 23.0 mg/dL    Creatinine 3.30 (H) 0.67 - 1.17 mg/dL    GFR Estimate 21 (L) >60 mL/min/1.73m2    Calcium 8.5 (L) 8.8 - 10.4 mg/dL    Chloride 116 (H) 98 - 107 mmol/L    Glucose 114 (H) 70 - 99 mg/dL    Alkaline Phosphatase 37 (L) 40 - 150 U/L     (HH) 0 - 45 U/L     (H) 0 - 70 U/L    Protein Total 4.8 (L) 6.4 - 8.3 g/dL    Albumin 3.1 (L) 3.5 - 5.2 g/dL    Bilirubin Total 1.3 (H) <=1.2 mg/dL   Glucose whole blood   Result Value Ref Range    Glucose 109 (H) 70 - 99 mg/dL   Heparin Unfractionated Anti Xa Level   Result Value Ref Range    Anti Xa Unfractionated Heparin <0.10 For Reference Range, See Comment IU/mL    Narrative    Therapeutic Range: UFH: 0.25-0.50 IU/mL for low intensity dosing,  0.30-0.70 IU/mL for high intensity dosing DVT and PE.  This test is not validated for other direct factor X inhibitors (e.g. rivaroxaban, apixaban, edoxaban, betrixaban, fondaparinux) and should not be used for monitoring of other medications.    INR   Result Value Ref Range    INR 1.90 (H) 0.85 - 1.15   D dimer quantitative   Result Value Ref Range    D-Dimer Quantitative 3.42 (H) 0.00 - 0.50 ug/mL FEU    Narrative    This D-dimer assay is intended for use in conjunction with a clinical pretest probability assessment model to exclude pulmonary embolism (PE) and deep venous thrombosis (DVT) in outpatients suspected of PE or DVT. The cut-off value is 0.50 ug/mL FEU.    For patients 50 years of age or older, the application of age-adjusted cut-off values for D-Dimer may increase the specificity without significant effect on sensitivity. The literature suggested calculation age adjusted cut-off in ug/L = age in years x 10 ug/L. The results in this laboratory are reported as ug/mL rather than ug/L. The calculation for age adjusted cut off in ug/mL= age in years x 0.01 ug/mL. For example, the cut off for a 76 year old male is 75 x 0.01 ug/mL = 0.76 ug/mL (760 ug/L).    M Bella et al. Age adjusted D-dimer cut-off levels to rule out pulmonary embolism: The ADJUST-PE Study. GUDELIA 2014;311:4226-5121.; HJ Anastacio et al. Diagnostic accuracy of conventional or age adjusted D-dimer cutoff values in older patients with suspected venous thromboembolism. Systemic review and meta-analysis. BMJ 2013:346:f2492.   Fibrinogen activity   Result Value Ref Range    Fibrinogen Activity 362 170 - 510 mg/dL   Lactic acid whole blood   Result Value Ref Range    Lactic Acid 2.4 (H) 0.7 - 2.0 mmol/L   Magnesium   Result Value Ref Range    Magnesium 2.0 1.7 - 2.3 mg/dL   Partial thromboplastin time   Result Value Ref Range    aPTT 65 (H) 22 - 38 Seconds   Troponin T, High Sensitivity   Result Value Ref Range    Troponin T, High Sensitivity >10,000 (HH) <=22 ng/L   CK total   Result Value Ref Range    CK 5,324 (HH) 39 - 308 U/L   Glucose by meter   Result Value Ref Range    GLUCOSE BY METER POCT 110 (H) 70 - 99 mg/dL   Activated clotting time celite, POCT   Result Value Ref Range     Activated Clotting Time (Celite) POCT 174 (H) 74 - 150 seconds   Blood gas arterial   Result Value Ref Range    pH Arterial 7.38 7.35 - 7.45    pCO2 Arterial 37 35 - 45 mm Hg    pO2 Arterial 50 (L) 80 - 105 mm Hg    FIO2 60     Bicarbonate Arterial 22 21 - 28 mmol/L    Base Excess/Deficit Arterial -3.1 (L) -3.0 - 3.0 mmol/L    Ollie's Test Artline     Oxyhemoglobin Arterial 86 (L) 92 - 100 %    O2 Sat, Arterial 87.8 (L) 96.0 - 97.0 %    Peep 8 cm H2O    Narrative    In healthy individuals, oxyhemoglobin (O2Hb) and oxygen saturation (SO2) are approximately equal. In the presence of dyshemoglobins, oxyhemoglobin can be considerably lower than oxygen saturation.   Quantiferon-TB Gold Plus    Specimen: Peripheral Blood    Narrative    The following orders were created for panel order Quantiferon-TB Gold Plus.  Procedure                               Abnormality         Status                     ---------                               -----------         ------                     Quantiferon TB Gold Plus...[[id number]]                      In process                 Quantiferon TB Gold Plus...[[id number]]                      In process                 Quantiferon TB Gold Plus...[[id number]]                      In process                 Quantiferon TB Gold Plus...[[id number]]                      In process                   Please view results for these tests on the individual orders.   Activated clotting time celite, POCT   Result Value Ref Range    Activated Clotting Time (Celite) POCT 190 (H) 74 - 150 seconds   Glucose by meter   Result Value Ref Range    GLUCOSE BY METER POCT 107 (H) 70 - 99 mg/dL   Blood gas arterial   Result Value Ref Range    pH Arterial 7.38 7.35 - 7.45    pCO2 Arterial 37 35 - 45 mm Hg    pO2 Arterial 55 (L) 80 - 105 mm Hg    FIO2 80     Bicarbonate Arterial 21 21 - 28 mmol/L    Base Excess/Deficit Arterial -3.3 (L) -3.0 - 3.0 mmol/L    Ollie's Test Artline     Oxyhemoglobin Arterial 90 (L) 92  - 100 %    O2 Sat, Arterial 91.6 (L) 96.0 - 97.0 %    Narrative    In healthy individuals, oxyhemoglobin (O2Hb) and oxygen saturation (SO2) are approximately equal. In the presence of dyshemoglobins, oxyhemoglobin can be considerably lower than oxygen saturation.   Glucose by meter   Result Value Ref Range    GLUCOSE BY METER POCT 109 (H) 70 - 99 mg/dL   Activated clotting time celite, POCT   Result Value Ref Range    Activated Clotting Time (Celite) POCT 190 (H) 74 - 150 seconds   Blood gas arterial   Result Value Ref Range    pH Arterial 7.37 7.35 - 7.45    pCO2 Arterial 37 35 - 45 mm Hg    pO2 Arterial 55 (L) 80 - 105 mm Hg    FIO2 80     Bicarbonate Arterial 22 21 - 28 mmol/L    Base Excess/Deficit Arterial -3.2 (L) -3.0 - 3.0 mmol/L    Ollie's Test Artline     Oxyhemoglobin Arterial 89 (L) 92 - 100 %    O2 Sat, Arterial 90.6 (L) 96.0 - 97.0 %    Narrative    In healthy individuals, oxyhemoglobin (O2Hb) and oxygen saturation (SO2) are approximately equal. In the presence of dyshemoglobins, oxyhemoglobin can be considerably lower than oxygen saturation.   Calcium ionized whole blood   Result Value Ref Range    Calcium Ionized Whole Blood 4.6 4.4 - 5.2 mg/dL   CBC with platelets   Result Value Ref Range    WBC Count 10.4 4.0 - 11.0 10e3/uL    RBC Count 3.80 (L) 4.40 - 5.90 10e6/uL    Hemoglobin 12.2 (L) 13.3 - 17.7 g/dL    Hematocrit 32.8 (L) 40.0 - 53.0 %    MCV 86 78 - 100 fL    MCH 32.1 26.5 - 33.0 pg    MCHC 37.2 (H) 31.5 - 36.5 g/dL    RDW 14.2 10.0 - 15.0 %    Platelet Count 92 (L) 150 - 450 10e3/uL   Comprehensive metabolic panel   Result Value Ref Range    Sodium 149 (H) 135 - 145 mmol/L    Potassium 4.8 3.4 - 5.3 mmol/L    Carbon Dioxide (CO2) 21 (L) 22 - 29 mmol/L    Anion Gap 13 7 - 15 mmol/L    Urea Nitrogen 33.5 (H) 8.0 - 23.0 mg/dL    Creatinine 3.48 (H) 0.67 - 1.17 mg/dL    GFR Estimate 19 (L) >60 mL/min/1.73m2    Calcium 8.3 (L) 8.8 - 10.4 mg/dL    Chloride 115 (H) 98 - 107 mmol/L    Glucose 118  (H) 70 - 99 mg/dL    Alkaline Phosphatase 40 40 - 150 U/L     (HH) 0 - 45 U/L     (H) 0 - 70 U/L    Protein Total 4.8 (L) 6.4 - 8.3 g/dL    Albumin 3.0 (L) 3.5 - 5.2 g/dL    Bilirubin Total 1.4 (H) <=1.2 mg/dL   Glucose whole blood   Result Value Ref Range    Glucose 113 (H) 70 - 99 mg/dL   Heparin Unfractionated Anti Xa Level   Result Value Ref Range    Anti Xa Unfractionated Heparin <0.10 For Reference Range, See Comment IU/mL    Narrative    Therapeutic Range: UFH: 0.25-0.50 IU/mL for low intensity dosing,  0.30-0.70 IU/mL for high intensity dosing DVT and PE.  This test is not validated for other direct factor X inhibitors (e.g. rivaroxaban, apixaban, edoxaban, betrixaban, fondaparinux) and should not be used for monitoring of other medications.   INR   Result Value Ref Range    INR 2.01 (H) 0.85 - 1.15   Lactic acid whole blood   Result Value Ref Range    Lactic Acid 3.3 (H) 0.7 - 2.0 mmol/L   Magnesium   Result Value Ref Range    Magnesium 2.0 1.7 - 2.3 mg/dL   Partial thromboplastin time   Result Value Ref Range    aPTT 101 (H) 22 - 38 Seconds   Troponin T, High Sensitivity   Result Value Ref Range    Troponin T, High Sensitivity >10,000 (HH) <=22 ng/L   Glucose by meter   Result Value Ref Range    GLUCOSE BY METER POCT 112 (H) 70 - 99 mg/dL   CONDITIONAL Prepare pheresed platelets (unit)   Result Value Ref Range    Blood Component Type Platelets     Product Code C3216P37     Unit Status Transfused     Unit Number [id number]     CODING SYSTEM [id number]     ISSUE DATE AND TIME [id number]     UNIT ABO/RH A+     UNIT TYPE ISBT 6200    Blood gas arterial   Result Value Ref Range    pH Arterial 7.37 7.35 - 7.45    pCO2 Arterial 36 35 - 45 mm Hg    pO2 Arterial 99 80 - 105 mm Hg    FIO2 100     Bicarbonate Arterial 21 21 - 28 mmol/L    Base Excess/Deficit Arterial -3.6 (L) -3.0 - 3.0 mmol/L    Ollie's Test Artline     Oxyhemoglobin Arterial 96 92 - 100 %    O2 Sat, Arterial 98.6 (H) 96.0 - 97.0 %     Narrative    In healthy individuals, oxyhemoglobin (O2Hb) and oxygen saturation (SO2) are approximately equal. In the presence of dyshemoglobins, oxyhemoglobin can be considerably lower than oxygen saturation.   Glucose by meter   Result Value Ref Range    GLUCOSE BY METER POCT 114 (H) 70 - 99 mg/dL   Activated clotting time celite, POCT   Result Value Ref Range    Activated Clotting Time (Celite) POCT 182 (H) 74 - 150 seconds   Blood gas arterial   Result Value Ref Range    pH Arterial 7.33 (L) 7.35 - 7.45    pCO2 Arterial 44 35 - 45 mm Hg    pO2 Arterial 87 80 - 105 mm Hg    FIO2 100     Bicarbonate Arterial 23 21 - 28 mmol/L    Base Excess/Deficit Arterial -3.3 (L) -3.0 - 3.0 mmol/L    Ollie's Test Artline     Oxyhemoglobin Arterial 95 92 - 100 %    O2 Sat, Arterial 97.3 (H) 96.0 - 97.0 %    Narrative    In healthy individuals, oxyhemoglobin (O2Hb) and oxygen saturation (SO2) are approximately equal. In the presence of dyshemoglobins, oxyhemoglobin can be considerably lower than oxygen saturation.   Glucose by meter   Result Value Ref Range    GLUCOSE BY METER POCT 128 (H) 70 - 99 mg/dL   Blood gas arterial   Result Value Ref Range    pH Arterial 7.34 (L) 7.35 - 7.45    pCO2 Arterial 42 35 - 45 mm Hg    pO2 Arterial 115 (H) 80 - 105 mm Hg    FIO2 100     Bicarbonate Arterial 23 21 - 28 mmol/L    Base Excess/Deficit Arterial -3.0 -3.0 - 3.0 mmol/L    Ollie's Test Artline     Oxyhemoglobin Arterial 98 92 - 100 %    O2 Sat, Arterial 99.7 (H) 96.0 - 97.0 %    Narrative    In healthy individuals, oxyhemoglobin (O2Hb) and oxygen saturation (SO2) are approximately equal. In the presence of dyshemoglobins, oxyhemoglobin can be considerably lower than oxygen saturation.   Blood gas ELS arterial   Result Value Ref Range    pH ELS Arterial 7.38 7.35 - 7.45    pCO2 ELS Arterial 37 35 - 45 mm Hg    pO2 ELS Arterial 261 (H) 80 - 105 mm Hg    Bicarbonate ELS Arterial 22 21 - 28 mmol/L    Base Excess/Deficit Arterial -2.7  -3.0 - 3.0 mmol/L    FIO2 60     Oxyhemoglobin ELS Arterial 98 75 - 100 %    O2 Saturation ELS Arterial >100.0 (H) 96.0 - 97.0 %    Narrative    In healthy individuals, oxyhemoglobin (O2Hb) and oxygen saturation (SO2) are approximately equal. In the presence of dyshemoglobins, oxyhemoglobin can be considerably lower than oxygen saturation.   Blood gas ELS venous   Result Value Ref Range    pH ELS Venous 7.34 7.32 - 7.43    pCO2 ELS Venous 44 40 - 50 mm Hg    pO2 ELS Venous 42 25 - 47 mm Hg    Bicarbonate ELS Venous 24 21 - 28 mmol/L    Base Excess/Deficit Venous -2.1 -3.0 - 3.0 mmol/L    FIO2 100     Oxyhemoglobin ELS Venous 78 %    O2 Saturation ELS Venous 79.8 (H) 70.0 - 75.0 %    Narrative    In healthy individuals, oxyhemoglobin (O2Hb) and oxygen saturation (SO2) are approximately equal. In the presence of dyshemoglobins, oxyhemoglobin can be considerably lower than oxygen saturation.   Calcium ionized whole blood   Result Value Ref Range    Calcium Ionized Whole Blood 4.6 4.4 - 5.2 mg/dL   CBC with platelets   Result Value Ref Range    WBC Count 10.2 4.0 - 11.0 10e3/uL    RBC Count 3.71 (L) 4.40 - 5.90 10e6/uL    Hemoglobin 11.6 (L) 13.3 - 17.7 g/dL    Hematocrit 32.8 (L) 40.0 - 53.0 %    MCV 88 78 - 100 fL    MCH 31.3 26.5 - 33.0 pg    MCHC 35.4 31.5 - 36.5 g/dL    RDW 14.4 10.0 - 15.0 %    Platelet Count 103 (L) 150 - 450 10e3/uL   Comprehensive metabolic panel   Result Value Ref Range    Sodium 148 (H) 135 - 145 mmol/L    Potassium 4.9 3.4 - 5.3 mmol/L    Carbon Dioxide (CO2) 21 (L) 22 - 29 mmol/L    Anion Gap 13 7 - 15 mmol/L    Urea Nitrogen 36.8 (H) 8.0 - 23.0 mg/dL    Creatinine 3.67 (H) 0.67 - 1.17 mg/dL    GFR Estimate 18 (L) >60 mL/min/1.73m2    Calcium 8.4 (L) 8.8 - 10.4 mg/dL    Chloride 114 (H) 98 - 107 mmol/L    Glucose 135 (H) 70 - 99 mg/dL    Alkaline Phosphatase 46 40 - 150 U/L     (HH) 0 - 45 U/L     (H) 0 - 70 U/L    Protein Total 4.9 (L) 6.4 - 8.3 g/dL    Albumin 3.0 (L) 3.5 -  5.2 g/dL    Bilirubin Total 1.5 (H) <=1.2 mg/dL   Glucose whole blood   Result Value Ref Range    Glucose 129 (H) 70 - 99 mg/dL   Heparin Unfractionated Anti Xa Level   Result Value Ref Range    Anti Xa Unfractionated Heparin 0.10 For Reference Range, See Comment IU/mL    Narrative    Therapeutic Range: UFH: 0.25-0.50 IU/mL for low intensity dosing,  0.30-0.70 IU/mL for high intensity dosing DVT and PE.  This test is not validated for other direct factor X inhibitors (e.g. rivaroxaban, apixaban, edoxaban, betrixaban, fondaparinux) and should not be used for monitoring of other medications.   INR   Result Value Ref Range    INR 1.92 (H) 0.85 - 1.15   D dimer quantitative   Result Value Ref Range    D-Dimer Quantitative 3.05 (H) 0.00 - 0.50 ug/mL FEU    Narrative    This D-dimer assay is intended for use in conjunction with a clinical pretest probability assessment model to exclude pulmonary embolism (PE) and deep venous thrombosis (DVT) in outpatients suspected of PE or DVT. The cut-off value is 0.50 ug/mL FEU.    For patients 50 years of age or older, the application of age-adjusted cut-off values for D-Dimer may increase the specificity without significant effect on sensitivity. The literature suggested calculation age adjusted cut-off in ug/L = age in years x 10 ug/L. The results in this laboratory are reported as ug/mL rather than ug/L. The calculation for age adjusted cut off in ug/mL= age in years x 0.01 ug/mL. For example, the cut off for a 76 year old male is 75 x 0.01 ug/mL = 0.76 ug/mL (760 ug/L).    M Bella et al. Age adjusted D-dimer cut-off levels to rule out pulmonary embolism: The ADJUST-PE Study. GUDELIA 2014;311:1294-0715.; FAVIO Chaves et al. Diagnostic accuracy of conventional or age adjusted D-dimer cutoff values in older patients with suspected venous thromboembolism. Systemic review and meta-analysis. BMJ 2013:346:f2492.   Fibrinogen activity   Result Value Ref Range    Fibrinogen Activity 468 170  - 510 mg/dL   Lactic acid whole blood   Result Value Ref Range    Lactic Acid 2.9 (H) 0.7 - 2.0 mmol/L   Magnesium   Result Value Ref Range    Magnesium 2.0 1.7 - 2.3 mg/dL   Partial thromboplastin time   Result Value Ref Range    aPTT 95 (H) 22 - 38 Seconds   Troponin T, High Sensitivity   Result Value Ref Range    Troponin T, High Sensitivity >10,000 (HH) <=22 ng/L   Phosphorus   Result Value Ref Range    Phosphorus 4.9 (H) 2.5 - 4.5 mg/dL   Glucose by meter   Result Value Ref Range    GLUCOSE BY METER POCT 131 (H) 70 - 99 mg/dL   Activated clotting time celite, POCT   Result Value Ref Range    Activated Clotting Time (Celite) POCT 199 (H) 74 - 150 seconds   Procalcitonin   Result Value Ref Range    Procalcitonin 97.90 (HH) <0.50 ng/mL   Ionized Calcium   Result Value Ref Range    Calcium Ionized Whole Blood 4.6 4.4 - 5.2 mg/dL   CRP inflammation   Result Value Ref Range    CRP Inflammation 274.00 (H) <5.00 mg/L   Hemoglobin plasma   Result Value Ref Range    Hemoglobin Plasma 30 (H) <30 mg/dL   Lactate Dehydrogenase   Result Value Ref Range    Lactate Dehydrogenase 2,343 (H) 0 - 250 U/L   Phosphorus   Result Value Ref Range    Phosphorus 4.6 (H) 2.5 - 4.5 mg/dL   TSH   Result Value Ref Range    TSH 16.90 (H) 0.30 - 4.20 uIU/mL   T3 Free   Result Value Ref Range    T3 Free 1.9 (L) 2.0 - 4.4 pg/mL   T3 total   Result Value Ref Range    T3 Total 73 (L) 85 - 202 ng/dL   T4 free   Result Value Ref Range    Free T4 1.09 0.90 - 1.70 ng/dL   Blood gas arterial   Result Value Ref Range    pH Arterial 7.36 7.35 - 7.45    pCO2 Arterial 40 35 - 45 mm Hg    pO2 Arterial 123 (H) 80 - 105 mm Hg    FIO2 100     Bicarbonate Arterial 23 21 - 28 mmol/L    Base Excess/Deficit Arterial -2.7 -3.0 - 3.0 mmol/L    Ollie's Test Artline     Oxyhemoglobin Arterial 98 92 - 100 %    O2 Sat, Arterial 99.9 (H) 96.0 - 97.0 %    Narrative    In healthy individuals, oxyhemoglobin (O2Hb) and oxygen saturation (SO2) are approximately equal. In the  presence of dyshemoglobins, oxyhemoglobin can be considerably lower than oxygen saturation.   Glucose by meter   Result Value Ref Range    GLUCOSE BY METER POCT 134 (H) 70 - 99 mg/dL   EKG 12-lead, tracing only   Result Value Ref Range    Systolic Blood Pressure  mmHg    Diastolic Blood Pressure  mmHg    Ventricular Rate 62 BPM    Atrial Rate 62 BPM    IN Interval 244 ms    QRS Duration 82 ms     ms    QTc 521 ms    P Axis 49 degrees    R AXIS 71 degrees    T Axis 160 degrees    Interpretation ECG       Sinus rhythm with 1st degree A-V block  Low voltage QRS  Nonspecific T wave abnormality  Prolonged QT  Abnormal ECG  When compared with ECG of 31-Jul-2024 06:07, (unconfirmed)  Premature ventricular complexes are no longer Present  IN interval has increased  Questionable change in QRS duration  Criteria for Lateral infarct are no longer Present

## 2024-08-01 NOTE — PROGRESS NOTES
"CLINICAL NUTRITION SERVICES - ASSESSMENT NOTE     Nutrition Prescription    RECOMMENDATIONS FOR MDs/PROVIDERS TO ORDER:  Recommend initiating EN via OGT within 24 hours. Please consult nutrition.     Malnutrition Status:    Patient does not meet two of the established criteria necessary for diagnosing malnutrition    Recommendations already ordered by Registered Dietitian (RD):  None at this time    Future/Additional Recommendations:  EN recommendations:  Novasource Renal (or equivalent) @ 25 ml/hr (600 ml) + 3 pkt Prosource TF20 provides 1440 kcal (15 kcal/kg actual BW), 114 g pro (2.1 g/kg IBW), 110 g CHO, 430 ml free water, and 0 g fiber daily.   - Initiate @ 15 ml/hr and advance by 10 ml q8hr as tolerated  - Do not start or advance until lytes (Mg++,K+) WNL and phos>2.0  - Recommend 30 ml q4hr fluid flushes for tube patency. Additional fluids and/or adjustments per MD.    - Order multivitamin/mineral (15 ml/day via FT) to help ensure micronutrient needs being met with suspected hypermetabolic demands and potential interruptions to TF infusions.  - Elevated HOB with gastric feeds      REASON FOR ASSESSMENT  Elias Solorzano is a/an 59 year old male assessed by the dietitian for Nutrition Risk Monitoring    NUTRITION HISTORY  Unable to obtain nutrition hx at this time as pt intubated.     CURRENT NUTRITION ORDERS  Diet: NPO    LABS  Labs reviewed - hypernatremia, hyperphosphatemia, elevated BUN/Cr, elevated lactic acid     MEDICATIONS  Medications reviewed - epi @ 0.04, levo @ 0.06, vaso @ 3    ANTHROPOMETRICS  Height: 157.5 cm (5' 2\")  Most Recent Weight: 104.3 kg (230 lb)    IBW: 53.6 kg  BMI: Obesity Grade II BMI 35-39.9 (based on lowest BW of 97.5 kg)  Weight History:   Wt Readings from Last 10 Encounters:   08/01/24 104.3 kg (230 lb)   03/08/24 92.1 kg (203 lb)     Dosing Weight: 98 kg (actual BW), 54 kg (IBW)    ASSESSED NUTRITION NEEDS  Estimated Energy Needs: 9371-2777 kcals/day (11 - 14 kcals/kg actual " "BW)  Justification: Obese and Vented  Estimated Protein Needs: 110-135 grams protein/day (2 - 2.5 grams of pro/kg)  Justification: ECMO  Estimated Fluid Needs: 1400 mL/day (1 mL/kcal)   Justification: Maintenance    PHYSICAL FINDINGS  See malnutrition section below.  No abnormal nutrition-related physical findings observed.     MALNUTRITION  % Intake: Unable to assess  % Weight Loss: None noted  Subcutaneous Fat Loss: None observed  Muscle Loss: None observed  Fluid Accumulation/Edema: Mild  Malnutrition Diagnosis: Patient does not meet two of the established criteria necessary for diagnosing malnutrition    NUTRITION DIAGNOSIS  Inadequate oral intake related to intubation as evidenced by NPO status.       INTERVENTIONS  Implementation  Enteral Nutrition - recommendations above     Goals  Diet adv v nutrition support within 24 hours.      Monitoring/Evaluation  Progress toward goals will be monitored and evaluated per protocol.  Janet Wallis, MS, RD, LD  Available on [facility] - \"4A Clinical Dietitian\"  Weekend/Holiday RD - \"Weekend Clinical Dietitian\"  "

## 2024-08-01 NOTE — PROGRESS NOTES
[facility]    ECLS Shift Summary:     ECMO Equipment:  Console Serial Number: [id number]  Circuit Lot Number: [id number]  Oxygenator Lot Number: [id number]    Circuit Assessment: Fibrin  Fibrin Location: Moderate fibrin at venous and arterial connectors; some mobile. Also at oxy outlet    Arterial ECMO Cannula: 17 Fr in the Right Femoral Artery  Venous ECMO Cannula: 25 Fr in the Right Femoral Vein  Distal Perfusion Catheter: 8 Fr in the Right Superficial Femoral Artery    ECMO Cannula Arterial Right femoral artery-Site Assessment: WDL, Secure, Sutured  ECMO Cannula Venous Right femoral vein-Site Assessment: WDL, Sutured, Secure  Distal Perfusion Catheter Right superficial femoral artery-Site Assessment: WDL, Sutured, Secure  ECMO Cannula Arterial Right femoral artery-Site Intervention: No intervention needed  ECMO Cannula Venous Right femoral vein-Site Intervention: No intervention needed  Distal Perfusion Catheter Right superficial femoral artery-Site Intervention: No intervention needed    Patient remains on V-A ECMO, all equipment is functioning and alarms are appropriately set. RPM's: 3500 with Blood Flow (Circuit) Avg: 3.7 LPM  Min: 3.63 LPM  Max: 3.82 LPM. Sweep is at 3 LPM and (S) 100 %. Extremities are warm/perfused.     Significant Shift Events:   Patient taken for nuc med scan today and declared brain dead this afternoon. Around 5 pm patient started de-saturing to low 80s and remains in mid 80s despite going up to FiO2 of 100% on both the ventilator and the circuit. Plan is to withdraw care soon.       Vent settings:  Vent Mode: PCV Plus assist  FiO2 (%): 90 %  Resp Rate (Set): 10 breaths/min  Tidal Volume (Set, mL): 350 mL  PEEP (cm H2O): 12 cmH2O  Inspiratory Pressure (cm H2O) (Drager Cande): 20  Resp: 10      Anticoagulation:  Dose (units/hr) HEParin: 900 Units/hr  Rate (mL/hr) HEParin: 9 mL/hr  Concentration HEParin: 100 Units/mL        Most recent: ACT   (seconds): 174 seconds    Urine output is increased from prior days. Sediment, Barbra.  Blood loss was nil. Product given included none.     Intake/Output Summary (Last 24 hours) at 8/1/2024 1717  Last data filed at 8/1/2024 1610  Gross per 24 hour   Intake 2991.63 ml   Output 2005 ml   Net 986.63 ml       Labs:  Recent Labs   Lab 08/01/24  1607 08/01/24  1211 08/01/24  0952 08/01/24  0749   PH 7.35 7.39 7.40 7.38   PCO2 43 38 37 39   PO2 71* 68* 58* 83   HCO3 24 23 23 23   O2PER 90  60  90 80 80 80       Lab Results   Component Value Date    HGB 11.2 (L) 08/01/2024    PHGB 30 (H) 08/01/2024    PLT 83 (L) 08/01/2024    FIBR 551 (H) 08/01/2024    INR 1.67 (H) 08/01/2024    PTT 61 (H) 08/01/2024    DD 3.31 (H) 08/01/2024    ANTCH 40 (L) 08/01/2024       Plan is for withdrawal of care.    Chloe Borges, RT  ECMO Specialist  8/1/2024 5:17 PM

## 2024-08-01 NOTE — DISCHARGE SUMMARY
[facility]   Cardiology Discharge Summary    Date of Admission:  2024  Date of Discharge:  2024   Discharging Provider: Armando Garcia MD  Date of Service: 2024     Primary Care     Rosa Elena Scott  [facility] [address]  [facility] [id number]      Identification and Chief Compaint: Elias Solorzano is a 59 year old  male being admitted to the CICU on 2024 s/p refractory VF cardiac arrest requiring VA ECMO, patient was cannulated by [facility] ([facility] [place]).     Discharge Diagnoses       Cardiac arrest (H)    * No resolved hospital problems. *    Discharge Disposition   Patient  during this admission; TOD 24 15:15    Discharge Orders     Discharge Medications   Current Discharge Medication List        CONTINUE these medications which have NOT CHANGED    Details   albuterol (PROAIR HFA/PROVENTIL HFA/VENTOLIN HFA) 108 (90 Base) MCG/ACT inhaler Inhale 2 puffs into the lungs every 6 hours as needed for shortness of breath, wheezing or cough      amLODIPine (NORVASC) 5 MG tablet Take 1 tablet by mouth daily      ASPIRIN LOW DOSE 81 MG EC tablet Take 1 tablet by mouth daily      atorvastatin (LIPITOR) 80 MG tablet Take 1 tablet by mouth daily      FLUoxetine (PROZAC) 40 MG capsule Take 1 capsule by mouth daily      lisinopril (ZESTRIL) 20 MG tablet Take 1 tablet by mouth daily      metFORMIN (GLUCOPHAGE XR) 500 MG 24 hr tablet Take 1 tablet by mouth daily      metoprolol succinate ER (TOPROL XL) 50 MG 24 hr tablet Take 1 tablet by mouth daily      mometasone (ASMANEX TWISTHALER) 220 MCG/ACT inhaler Inhale 1 puff into the lungs every evening Unsure of dose      nitroGLYcerin (NITROSTAT) 0.4 MG sublingual tablet DISSOLVE 1 PILL UNDER TONGUE EVERY 5 MINUTES AS NEEDED FOR CHEST PAIN/YOG MOB HAUV SIAB MUAB IB LUB TSHUAJ LOAN HAUV QAB NPLAIG TXHUA 5 JODI THIS      traZODone (DESYREL) 50 MG tablet Take 1 tablet by mouth every other day           Allergies   No  Known Allergies    Consultations This Hospital Stay  PHARMACY IP CONSULT  WOUND OSTOMY CONTINENCE NURSE  IP CONSULT  PHYSICAL THERAPY ADULT IP CONSULT  OCCUPATIONAL THERAPY ADULT IP CONSULT  PHARMACY TO DOSE VANCO  PHARMACY IP CONSULT  CARDIAC REHAB IP CONSULT  NEPHROLOGY ICU IP CONSULT  CARE MANAGEMENT / SOCIAL WORK IP CONSULT  NEUROLOGY CRITICAL CARE ADULT IP CONSULT  SMOKING CESSATION PROGRAM IP CONSULT    Significant Results and Procedures   Procedures:  Coronary Angiogram and VA ECMO cannulation 7/30/24  Conclusion    Cardiac arrest.  VT/VF.  Cardiogenic shock.  Status post VA ECMO / ECPR.  Severe three vessel CAD with prior PCI to the LAD, RCA, and severe disease of the small caliber distal LCx.  Patent stents in the RCA and LAD with mild in stent restenosis.  Central venous catheter placement.  Radial arterial line placement.  IABP placement.         Plan     Follow bedrest per protocol   Continued medical management and lifestyle modifications for cardiovascular risk factor optimizations.   Admit to inpatient        - Further treatment per CICU team     Coronary Findings    Diagnostic  Dominance: Right  Left Main   The vessel is large.   Ost LM to Dist LM lesion is 30% stenosed.      Left Anterior Descending   The vessel is angiographically normal.   Ost LAD to Prox LAD lesion is 40% stenosed. The lesion was previously treated using a stent of unknown type.   Prox LAD to Mid LAD lesion is 30% stenosed. The lesion was previously treated using a stent of unknown type.   Dist LAD lesion is 50% stenosed.      First Diagonal Branch   The vessel is large.   1st Diag-1 lesion is 40% stenosed.   1st Diag-2 lesion is 30% stenosed. The lesion was previously treated using a stent of unknown type.   1st Diag-3 lesion is 40% stenosed.      Second Diagonal Branch   The vessel is small and is angiographically normal.      Third Diagonal Branch   The vessel is moderate in size and is angiographically normal.      Left  Circumflex   Ost Cx to Prox Cx lesion is 30% stenosed.   Mid Cx to Dist Cx lesion is 75% stenosed.   Dist Cx lesion is 50% stenosed.      First Obtuse Marginal Branch   The vessel is small.   1st Mrg lesion is 50% stenosed.      Second Obtuse Marginal Branch   The vessel is large.   2nd Mrg lesion is 30% stenosed.      Right Coronary Artery   The vessel is angiographically normal.   Ost RCA to Mid RCA lesion is 30% stenosed.   Mid RCA to Dist RCA lesion is 50% stenosed.      Right Posterior Descending Artery   The vessel is moderate in size.   RPDA lesion is 50% stenosed. The lesion was previously treated using a stent of unknown type.      Right Posterior Atrioventricular Artery   The vessel is moderate in size.   RPAV lesion is 30% stenosed.      First Right Posterolateral Branch   The vessel is small and is angiographically normal.      Second Right Posterolateral Branch   The vessel is small and is angiographically normal.             Imaging:  NM Brain Scan Complete w Vascular Flow   Final Result   IMPRESSION:   No evidence of intercranial perfusion compatible with brain death.      I have personally reviewed the examination and initial interpretation   and I agree with the findings.      BERRY OVALLE MD            SYSTEM ID:  [id number]      CT Head w/o Contrast   Final Result   IMPRESSION: Diffuse cerebral and cerebellar edema secondary to diffuse   anoxic injury. Complete effacement of the basal cisterns and early   cerebellar tonsillar herniation.      GEOFF MARCIAL MD            SYSTEM ID:  [id number]      XR Chest Port 1 View   Final Result   Impression:    1. Endotracheal tube terminates in the midthoracic trachea, slightly   retracted compared to prior. Otherwise stable support devices.   2. Increased diffuse mixed pulmonary opacities, greatest in the left   upper lung fields. Differential includes infection, edema, and/or   atelectasis.      I have personally reviewed the examination and initial  interpretation   and I agree with the findings.      CALLY FRANCO MD            SYSTEM ID:  [id number]      Echocardiogram Complete   Final Result      XR Abdomen Port 1 View   Final Result   IMPRESSION:    Enteric tube sidehole and tip projecting over the stomach. Trace   interstitial pulmonary edema and bilateral pleural effusions.   Nonobstructive bowel gas pattern however there may be mild colonic   ileus.      I have personally reviewed the examination and initial interpretation   and I agree with the findings.      OBED LUNA MD            SYSTEM ID:  [id number]      XR Chest Port 1 View   Final Result   Impression:    1. Endotracheal tube tip projects 2.0 cm above the ron.   2. Enteric tube sidehole projects over the gastric esophageal   junction. Consider slight advancement.   3. Mild perihilar and bibasilar opacities, better seen on same day CT.   4. Cardiomegaly.      I have personally reviewed the examination and initial interpretation   and I agree with the findings.      KELBY MALDONADO MD            SYSTEM ID:  [id number]      US Lower Extremity Arterial Duplex Bilateral   Final Result   IMPRESSION:   1. 6.47 velocity ratio from the right superficial femoral artery in   the mid thigh to distal thigh. Color Doppler image suggests a   significant stenosis.      2. Dopplerable flow in bilateral anterior and posterior tibial   arteries at the ankles.      GISELL BUENROSTRO MD            SYSTEM ID:  [id number]      US Carotid Bilateral   Final Result   IMPRESSION:      1. RIGHT ICA: Less than 50% diameter narrowing by grayscale imaging   and sonographic velocity criteria.      2. LEFT ICA:  Less than 50% diameter narrowing by grayscale imaging   and sonographic velocity criteria.      [employer]reditation Commission Updated Recommendation for   Carotid Stenosis Interpretation Criteria - November 2023.   https://intersocietal.org/wp-content/uploads/2023/11/YTN-Azjtknn-Fvwaj    endations-for-Carotid-[email or web address]           Normal             ICA PSV: < 180 cm/s             Plaque Estimate: None             ICA/CCA PSV Ratio: < 2.0             ICA EDV: < 40 cm/s           < 50%             ICA PSV: < 180 cm/s             Plaque Estimate: < 50%             ICA/CCA PSV Ratio: < 2.0             ICA EDV: < 40 cm/s           50 - 69%             ICA PSV: 180 - 230 cm/s             Plaque Estimate: > 50%             ICA/CCA PSV Ratio: 2.0 - 4.0             ICA EDV: 40 - 100 cm/s        50 - 69%             ICA PSV: 125-180 cm/s             AND             ICA/CCA PSV Ratio: > or = 2.0           > 70% but less than near occlusion             ICA PSV: > 230 cm/s             Plaque Estimate: > 50%             AND either             ICA EDV: > 100 cm/s             or             ICA/CCA PSV Ratio: > 4.0           Near occlusion             ICA PSV: High, low, or undetectable             Plaque Estimate: Visible             ICA/CCA PSV Ratio: Variable             ICA EDV: Variable           Total occlusion             ICA PSV: Undetectable             Plaque Estimate: Visible, no detectable lumen             ICA/CCA PSV Ratio: N/A             ICA EDV: N/A                                            Additional criteria from vascular surgery      > 80%        EDV > 120 cm/s      GISELL BUENROSTRO MD            SYSTEM ID:  [id number]      CT Chest Abdomen Pelvis w/o Contrast   Final Result   Abnormal   IMPRESSION:   1.  Multifocal linear and consolidative opacities throughout the lungs   most prominent in the right upper lobe which could represent   atelectasis, pulmonary hemorrhage, or infection/aspiration in the   context of recent cardiac arrest.   2.  Nodular consolidative opacities in the left lower lobe consistent   with aspiration pneumonitis versus ongoing postobstructive pneumonia   in the setting of a central left lower lobe broncholith (as seen on   2/21/2024 chest CT).    3.  Findings of pulmonary edema with trace bilateral pleural   effusions.   4.  Endotracheal tube tip with slight mainstem bronchus intubation,   recommend retraction approximately 4 cm. Other supportive devices as   described above.    5.  Cardiomegaly with left atrial enlargement.   6.  Intermediately attenuating lesion measuring up to 2.4 cm in the   midpole of the right kidney is indeterminant on these noncontrast   images raises concern for renal cell carcinoma. Recommend further   characterization with renal mass protocol MRI when clinically   appropriate.   7.  Multiple bilateral nondisplaced anterolateral rib fractures   related to chest compressions.      [Access Center: Right mainstem intubation]      This report will be copied to the [facility] to ensure a   provider acknowledges the finding. Access Center is available Monday   through Friday 8am-3:30 pm.          [Consider Follow Up: Indeterminate right renal mass]      This report will be copied to the [facility] to ensure a   provider acknowledges the finding. Access Center is available Monday   through Friday 8am-3:30 pm.            I have personally reviewed the examination and initial interpretation   and I agree with the findings.      LIZBETH ADAMS DO            SYSTEM ID:  [id number]      CT Head w/o Contrast   Final Result   Abnormal   IMPRESSION: Findings consistent with diffuse hypoxic ischemic injury.   No transtentorial herniation at this time.       [Urgent Result: Diffuse hypoxic ischemic injury]      Finding was identified on 7/30/2024 9:32 AM.       Dr. Miguel was contacted by Dr. Pickens at 7/30/2024 9:37 AM and   verbalized understanding of the urgent finding.       I have personally reviewed the examination and initial interpretation   and I agree with the findings.      CHRISTINE WRIGHT MD            SYSTEM ID:  [id number]      Cardiac Catheterization   Final Result      XR Chest Port 1 View    (Results Pending)  "  CT Head w/o Contrast    (Results Pending)     History of Present Illness   (From H&P)   \"Elias Solorzano is a 59 year old  male being admitted to the CICU on 07/30/2024 s/p refractory VF cardiac arrest requiring VA ECMO, patient was cannulated by [facility] ([facility] The patient has an unknown past medical hx.      Witnessed arest (Y/N): N  Home or public location (Y/N): home  Bystander CPR (Y/N): N  Time to compressions: 5-10 mins estimated  Time to cannulation: 61 mins (0508 dispatch time, 0609 cannulation)  Initial rhythm: VF  Did they have intermittent ROSC (Y/N): N  # of shocks: 4  Epi: 3 mg  Amio: 450 mg     Mod disease, no culprit lesion, no stents. Added amiodarone and lidocaine infusions.      IABP placed in CCL here at the [facility]"    Hospital Course   Elias Solorzano was admitted on 7/30/2024.  The following problems were addressed during his hospitalization:    #Concern for anoxic brain injury    - Intubated, sedated at time of arrest. Subsequent CTH with severe anoxic brain injury. Neuro Crit followed during the hospitalization. Patient's neuro exam did not improve during the admission. NM brain perfusion study confirmed brain death. Time of death 8/1/24 15:15.     #Refractory VF Cardiac arrest   #Shock requiring VA ECMO  #CAD  #Cardiomyopathy  #Dyslipidemia:  - Cardiac arrest on 7/31. Placed on VA ECMO. History of known CAD with prior stents. Moderate disease without culprit lesion. Required multiple pressors over the hospitalization. Continued on amiodarone.     #Acute hypoxemic respiratory failure requiring intubation  #Probable aspiration pneumonia  #rib fractures  - Intubated in the setting of arrest.    #Concern for Acute Renal Injury 2/2 cardiac arrest  #Lactic acidosis  #Rhabdomyolysis  #ATN  - Nephrology followed during admission. Was on CRRT throughout admission    #Aspiration Pneumonia- in the setting of arrest.   - Treated with CTX/vanc    #Hyperglycemia   - Treated with insulin gtt    #Anemia " of critical illness   - Required intermittent transfusions    #Concern for Shock liver 2/2 cardiac arrest  #Hypoalbuminemia   - NPO following arrest.     Pending Results   Unresulted Labs Ordered in the Past 30 Days of this Admission       Date and Time Order Name Status Description    7/31/2024  6:09 PM Quantiferon TB Gold Plus Purple Tube In process     7/31/2024  6:09 PM Quantiferon TB Gold Plus Yellow Tube In process     7/31/2024  6:09 PM Quantiferon TB Gold Plus Green Tube In process     7/31/2024  6:09 PM Quantiferon TB Gold Plus Grey Tube In process     7/31/2024  2:52 AM S 100B Protein In process     7/31/2024  2:52 AM Neuron Specific Enolase In process     7/30/2024  2:52 PM S 100B Protein In process     7/30/2024  2:52 PM Neuron Specific Enolase In process     7/30/2024  2:24 PM Blood Culture Hand, Left Preliminary     7/30/2024 11:46 AM Fentanyl and Metabolite Quantitative, Urine In process     7/30/2024  8:52 AM S 100B Protein In process     7/30/2024  8:52 AM Drug Screen Comp Med Report WB In process             Physical Exam   Refer to Death Note by [facility] staff    The discharge plan was discussed with the patient and all questions answered.     Notified Dr. Sanchez.    SHELBI Howe, CNP  Critical Care Cardiology  Securely message with the Vocera Web Console (learn more here)

## 2024-08-02 VITALS
TEMPERATURE: 98.2 F | RESPIRATION RATE: 10 BRPM | HEART RATE: 96 BPM | OXYGEN SATURATION: 84 % | BODY MASS INDEX: 42.71 KG/M2 | HEIGHT: 62 IN | WEIGHT: 232.1 LBS

## 2024-08-02 LAB
ABO/RH(D): NORMAL
ACT BLD: 190 SECONDS (ref 74–150)
ACT BLD: 195 SECONDS (ref 74–150)
ACT BLD: 195 SECONDS (ref 74–150)
ALBUMIN SERPL BCG-MCNC: 2.9 G/DL (ref 3.5–5.2)
ALLEN'S TEST: ABNORMAL
ALP SERPL-CCNC: 90 U/L (ref 40–150)
ALT SERPL W P-5'-P-CCNC: 151 U/L (ref 0–70)
ANION GAP SERPL CALCULATED.3IONS-SCNC: 11 MMOL/L (ref 7–15)
ANTIBODY SCREEN: NEGATIVE
APTT PPP: 72 SECONDS (ref 22–38)
AST SERPL W P-5'-P-CCNC: 601 U/L (ref 0–45)
AT III ACT/NOR PPP CHRO: 41 % (ref 85–135)
BASE EXCESS BLDA CALC-SCNC: -1.3 MMOL/L (ref -3–3)
BASE EXCESS BLDA CALC-SCNC: -1.4 MMOL/L (ref -3–3)
BASE EXCESS BLDV CALC-SCNC: -1 MMOL/L (ref -3–3)
BILIRUB SERPL-MCNC: 2.5 MG/DL
BUN SERPL-MCNC: 45 MG/DL (ref 8–23)
CA-I BLD-MCNC: 4.8 MG/DL (ref 4.4–5.2)
CALCIUM SERPL-MCNC: 8.7 MG/DL (ref 8.8–10.4)
CHLORIDE SERPL-SCNC: 114 MMOL/L (ref 98–107)
CK SERPL-CCNC: 5176 U/L (ref 39–308)
COHGB MFR BLD: 94.7 % (ref 96–97)
CREAT SERPL-MCNC: 3.82 MG/DL (ref 0.67–1.17)
CRP SERPL-MCNC: 327 MG/L
D DIMER PPP FEU-MCNC: 3.98 UG/ML FEU (ref 0–0.5)
EGFRCR SERPLBLD CKD-EPI 2021: 17 ML/MIN/1.73M2
ERYTHROCYTE [DISTWIDTH] IN BLOOD BY AUTOMATED COUNT: 14.6 % (ref 10–15)
ERYTHROCYTE [SEDIMENTATION RATE] IN BLOOD BY WESTERGREN METHOD: 65 MM/HR (ref 0–20)
FENTANYL UR-MCNC: 5.6 NG/ML
FIBRINOGEN PPP-MCNC: 662 MG/DL (ref 170–510)
GAMMA INTERFERON BACKGROUND BLD IA-ACNC: 0.02 IU/ML
GLUCOSE BLD-MCNC: 112 MG/DL (ref 70–99)
GLUCOSE BLDC GLUCOMTR-MCNC: 112 MG/DL (ref 70–99)
GLUCOSE SERPL-MCNC: 112 MG/DL (ref 70–99)
HCO3 BLD-SCNC: 24 MMOL/L (ref 21–28)
HCO3 BLDA-SCNC: 23 MMOL/L (ref 21–28)
HCO3 BLDV-SCNC: 24 MMOL/L (ref 21–28)
HCO3 SERPL-SCNC: 22 MMOL/L (ref 22–29)
HCT VFR BLD AUTO: 31.7 % (ref 40–53)
HGB BLD-MCNC: 11.2 G/DL (ref 13.3–17.7)
HGB FREE PLAS-MCNC: 30 MG/DL
INR PPP: 1.56 (ref 0.85–1.15)
LACTATE SERPL-SCNC: 2.2 MMOL/L (ref 0.7–2)
LDH SERPL L TO P-CCNC: 2261 U/L (ref 0–250)
M TB IFN-G BLD-IMP: POSITIVE
M TB IFN-G CD4+ BCKGRND COR BLD-ACNC: 3.58 IU/ML
MAGNESIUM SERPL-MCNC: 2 MG/DL (ref 1.7–2.3)
MCH RBC QN AUTO: 31.5 PG (ref 26.5–33)
MCHC RBC AUTO-ENTMCNC: 35.3 G/DL (ref 31.5–36.5)
MCV RBC AUTO: 89 FL (ref 78–100)
MITOGEN IGNF BCKGRD COR BLD-ACNC: 0.64 IU/ML
MITOGEN IGNF BCKGRD COR BLD-ACNC: 0.72 IU/ML
NORFENTANYL UR-MCNC: NORMAL NG/ML
NSE SERPL IA-MCNC: 122.5 NG/ML
NSE SERPL IA-MCNC: 194.9 NG/ML
O2/TOTAL GAS SETTING VFR VENT: 100 %
OXYHGB MFR BLDA: 99 % (ref 75–100)
OXYHGB MFR BLDV: 80 %
PCO2 BLD: 41 MM HG (ref 35–45)
PCO2 BLDA: 34 MM HG (ref 35–45)
PCO2 BLDV: 41 MM HG (ref 40–50)
PEEP: 12 CM H2O
PH BLD: 7.37 [PH] (ref 7.35–7.45)
PH BLDA: 7.43 [PH] (ref 7.35–7.45)
PH BLDV: 7.38 [PH] (ref 7.32–7.43)
PHOSPHATE SERPL-MCNC: 3.6 MG/DL (ref 2.5–4.5)
PLATELET # BLD AUTO: 72 10E3/UL (ref 150–450)
PO2 BLD: 68 MM HG (ref 80–105)
PO2 BLDA: 467 MM HG (ref 80–105)
PO2 BLDV: 43 MM HG (ref 25–47)
POTASSIUM SERPL-SCNC: 4.1 MMOL/L (ref 3.4–5.3)
PROT SERPL-MCNC: 4.9 G/DL (ref 6.4–8.3)
QUANTIFERON MITOGEN: 3.6 IU/ML
QUANTIFERON NIL TUBE: 0.02 IU/ML
QUANTIFERON TB1 TUBE: 0.66 IU/ML
QUANTIFERON TB2 TUBE: 0.74
RBC # BLD AUTO: 3.55 10E6/UL (ref 4.4–5.9)
SAO2 % BLDA: 92 % (ref 92–100)
SAO2 % BLDA: >100 % (ref 96–97)
SAO2 % BLDV: 82 % (ref 70–75)
SODIUM SERPL-SCNC: 147 MMOL/L (ref 135–145)
SPECIMEN EXPIRATION DATE: NORMAL
T3 SERPL-MCNC: 75 NG/DL (ref 85–202)
T3FREE SERPL-MCNC: 2 PG/ML (ref 2–4.4)
T4 FREE SERPL-MCNC: 1.47 NG/DL (ref 0.9–1.7)
TROPONIN T SERPL HS-MCNC: ABNORMAL NG/L
TSH SERPL DL<=0.005 MIU/L-ACNC: 12.6 UIU/ML (ref 0.3–4.2)
UFH PPP CHRO-ACNC: <0.1 IU/ML
WBC # BLD AUTO: 10.9 10E3/UL (ref 4–11)

## 2024-08-02 PROCEDURE — 82805 BLOOD GASES W/O2 SATURATION: CPT

## 2024-08-02 PROCEDURE — 83615 LACTATE (LD) (LDH) ENZYME: CPT | Performed by: NURSE PRACTITIONER

## 2024-08-02 PROCEDURE — 83735 ASSAY OF MAGNESIUM: CPT

## 2024-08-02 PROCEDURE — 85347 COAGULATION TIME ACTIVATED: CPT

## 2024-08-02 PROCEDURE — 85610 PROTHROMBIN TIME: CPT

## 2024-08-02 PROCEDURE — 85384 FIBRINOGEN ACTIVITY: CPT

## 2024-08-02 PROCEDURE — 85300 ANTITHROMBIN III ACTIVITY: CPT | Performed by: NURSE PRACTITIONER

## 2024-08-02 PROCEDURE — 85652 RBC SED RATE AUTOMATED: CPT | Performed by: NURSE PRACTITIONER

## 2024-08-02 PROCEDURE — 999N000157 HC STATISTIC RCP TIME EA 10 MIN

## 2024-08-02 PROCEDURE — 84480 ASSAY TRIIODOTHYRONINE (T3): CPT | Performed by: NURSE PRACTITIONER

## 2024-08-02 PROCEDURE — 999N000111 HC STATISTIC OT IP EVAL DEFER: Performed by: OCCUPATIONAL THERAPIST

## 2024-08-02 PROCEDURE — 94003 VENT MGMT INPAT SUBQ DAY: CPT

## 2024-08-02 PROCEDURE — 84484 ASSAY OF TROPONIN QUANT: CPT

## 2024-08-02 PROCEDURE — 33949 ECMO/ECLS DAILY MGMT ARTERY: CPT

## 2024-08-02 PROCEDURE — 84100 ASSAY OF PHOSPHORUS: CPT | Performed by: INTERNAL MEDICINE

## 2024-08-02 PROCEDURE — 85520 HEPARIN ASSAY: CPT

## 2024-08-02 PROCEDURE — 84481 FREE ASSAY (FT-3): CPT | Performed by: NURSE PRACTITIONER

## 2024-08-02 PROCEDURE — 85379 FIBRIN DEGRADATION QUANT: CPT

## 2024-08-02 PROCEDURE — 83605 ASSAY OF LACTIC ACID: CPT

## 2024-08-02 PROCEDURE — 83051 HEMOGLOBIN PLASMA: CPT | Performed by: NURSE PRACTITIONER

## 2024-08-02 PROCEDURE — 82805 BLOOD GASES W/O2 SATURATION: CPT | Performed by: INTERNAL MEDICINE

## 2024-08-02 PROCEDURE — 82550 ASSAY OF CK (CPK): CPT | Performed by: NURSE PRACTITIONER

## 2024-08-02 PROCEDURE — 84439 ASSAY OF FREE THYROXINE: CPT | Performed by: NURSE PRACTITIONER

## 2024-08-02 PROCEDURE — 82330 ASSAY OF CALCIUM: CPT

## 2024-08-02 PROCEDURE — 85018 HEMOGLOBIN: CPT

## 2024-08-02 PROCEDURE — 84443 ASSAY THYROID STIM HORMONE: CPT | Performed by: NURSE PRACTITIONER

## 2024-08-02 PROCEDURE — 82947 ASSAY GLUCOSE BLOOD QUANT: CPT

## 2024-08-02 PROCEDURE — 80053 COMPREHEN METABOLIC PANEL: CPT

## 2024-08-02 PROCEDURE — 86140 C-REACTIVE PROTEIN: CPT | Performed by: NURSE PRACTITIONER

## 2024-08-02 PROCEDURE — 85730 THROMBOPLASTIN TIME PARTIAL: CPT

## 2024-08-02 PROCEDURE — 999N000075 HC STATISTIC IABP MONITORING

## 2024-08-02 ASSESSMENT — ACTIVITIES OF DAILY LIVING (ADL)
ADLS_ACUITY_SCORE: 55

## 2024-08-02 NOTE — PROGRESS NOTES
Shift summary [phone]    Comfort care measures continued. Withdrawal of support at 1000 per family request. No patient belongings with patient. [facility] called w/time of death. Body sent to [facility].

## 2024-08-02 NOTE — PROGRESS NOTES
Care Management Discharge Note    Discharge Date: 2024       Discharge Disposition:  : [place]  Home [phone]    Discharge Services:  none    Discharge DME:  none    Discharge Transportation: n/a     Private pay costs discussed: Not applicable    Does the patient's insurance plan have a 3 day qualifying hospital stay waiver?  No    PAS Confirmation Code:    Patient/family educated on Medicare website which has current facility and service quality ratings:      Education Provided on the Discharge Plan:  n/a   Persons Notified of Discharge Plans: n/a   Patient/Family in Agreement with the Plan:  n/a     Handoff Referral Completed: No    Additional Information:  Pt passed away on 4A and requested to speak to writer regarding financial assistance for transportation to  home and  expenses, in [place]. Explained to family that as pt was a [place] resident unlikely pt would qualify for [place]  benefits. Also explained that  funds through [employer] include 234 beds at the [facility], whether here or in [place], are very limited. Writer contacted the  home and spoke to . He confirmed that there would not be any financial assistance available through [place] as [place] had not been a [place] resident. He also did not know of any other funds available for  expenses. Attempted to reach primary family contact (James [phone]) w/o success. Would anticipate that  home will update family as they work with them.       HALIE Wong, LICSW   4A/4E ICU   Phone: [phone]  Available via Minggl

## 2024-08-02 NOTE — PROGRESS NOTES
Cardiology ICU Progress Note    HPI:  Elias Solorzano is a 59 year old  male being admitted to the CICU on 07/30/2024 s/p refractory VF cardiac arrest requiring [facility] ECMO, patient [place] cann[place] [facility] [place] [place] medical hx significant for CAD, HLD, HTN, tobacco use.    Witnessed arest (Y/N): N  Home or public location (Y/N): home  Bystander CPR (Y/N): N  Time to compressions: 5-10 mins estimated  Time to cannulation: 61 mins (0508 dispatch time, 0609 cannulation)  Initial rhythm: VF  Did they have intermittent ROSC (Y/N): N  # of shocks: 4  Epi: 3 mg  Amio: 450 mg    Mod disease, no culprit lesion, no stents. IABP placed in CCL here at the .     Subjective and Interval:  - NCC consult yesterday. Formal brain death testing performed followed by confirmatory nuclear medicine test. Patient was declared brain dead at 1545 yesterday. Patient is not a life source candidate. Waiting for family to arrive from out of town before removal of life support.     Assessment and plan by system:  ==Today's changes ==  - comfort care    == Neurology ==   #Concern for anoxic brain injury    -Intubated, sedated. Avoiding hyperthermia  -continuous vEEG     CT head shows Findings consistent with diffuse hypoxic ischemic injury.  No transtentorial herniation at this time.     Current sedation:  RASS ([place] Agitation-Sedation Scale): -5-->unarousable    Plan:  -Continue sedation with a RASS goal -2 to 3  -Spontaneous awakening trial as tolerated  -Permissive hypercapnea and hypernatremia for neuroprotection    == Cardiovascular ==  #Refractory VF Cardiac arrest   #Shock requiring VA ECMO  #CAD  #Cardiomyopathy  #Dyslipidemia  Peripheral V-A ECMO inserted via LCFA and LCFV   Angiogram:   2V CAD with 40% ISR of proximal LAD stent which tapers down to a small caliber distal LAD with moderate disease; 30% ISR of proximal D1 stent; proximal Lcx with mild disease with a large caliber OM2 without significant disease, small  caliber distal Lcx with moderate disease; dominant RCA with moderate disease in mid segment and 50% ISR of proximal rPDA stent at the bifurcation with PL.  No culprit lesion for VT/VF identified.   Uncomplicated L femoral access.  TTE: pending  ECG Rhythm: Sinus rhythm  ECMO :  Mode: V-A   Pump Type: Cardiohelp  RPM's: 3549  Blood Flow (Circuit) LPM: 3.79 LPM  Sweep LPM: 4 LPM  Sweep FiO2   %: 100 %  Venous Pressure  mmHg: -50 mmHg  Arterial Pressure  mmH mmHg  Internal Pressure mmH mmHg  Pressure Change mmH mmHg    Current Pressors/inotropes/antiarrythmic:  Dose (mcg/kg/min) Norepinephrine: 0.4 mcg/kg/min, Dose (units/hr) Vasopressin: 4 Units/hr, and Dose (mcg/kg/min) Epinephrine: 0.1 mcg/kg/min    Hemodynamics:  Art Line  Arterial Line BP: 98/56  Arterial Line MAP (mmHg): 76 mmHg  Arterial Line Location: Right radial  Art Line Wave Form: Appropriate wave forms    Invasive Hemodynamic Monitoring:                            Plan:  -Continue ASA 81mg and ticagrelor 90mg BID   -Hold lipitor for now given shock liver  -Hold ACE/ARB for now given likely reduced renal fxn after arrest  -Hold beta blocker given shock  -Telemetry monitoring  -Trend troponin   -Continue ECMO support  -Continue IABP  -FBG even, hold diuresis    == Pulmonary ==  #Acute hypoxemic respiratory failure requiring intubation  #Probable aspiration pneumonia  #rib fractures  Vent Settings:   Vent Mode: PCV Plus assist  FiO2 (%): 100 %  Resp Rate (Set): 10 breaths/min  Tidal Volume (Set, mL): 350 mL  PEEP (cm H2O): 12 cmH2O  Inspiratory Pressure (cm H2O) (Drager Cande): 20  Resp: 10    ABG:   Recent Labs   Lab 24  0348 24  2339 24  2143   PH 7.37 7.36 7.30*   PCO2 41 43 50*   PO2 68* 62* 58*   HCO3 24 24 25   O2PER 100  100  100 100 100     CXR: pending  CT CAP no evidence of bleeding, cannula in stable position, ETT in appropriate  position.     Plan:  -Wean vent as able  -Daily CXR  -Serial ABGs   -Consider  scheduled duonebs if signs of lung dz, currently PRN    -Peridex  -FBG and diuresis as above        == Gastrointestinal, Nutrition ==  #Concern for Shock liver 2/2 cardiac arrest  #Hypoalbuminemia   No known medical hx.     CT CAP above  Recent Labs   Lab 08/02/24 0348 08/01/24 2142 08/01/24  1607   * 620* 649*   * 153* 157*   BILITOTAL 2.5* 2.2* 2.1*   ALBUMIN 2.9* 2.8* 2.9*   PROTTOTAL 4.9* 5.0* 4.9*   ALKPHOS 90 94 60     Diet NPO in immediate post arrest setting    Plan:  -Monitor LFTs  -Bowel regimen in place  -GI Prophylaxis: PPI; Protonix 40 mg daily     == Renal, Electrolytes ==  #Concern for Acute Renal Injury 2/2 cardiac arrest  #Lactic acidosis  #Rhabdomyolysis  #ATN  No intake or output data in the 24 hours ending 07/30/24 0803  Recent Labs   Lab 08/02/24 0348 08/01/24 2142 08/01/24  1607 08/01/24  0952 08/01/24  0526 08/01/24  0402   * 148* 148*   < >  --  148*   POTASSIUM 4.1 4.4 4.1   < >  --  4.9   CHLORIDE 114* 114* 114*   < >  --  114*   CO2 22 22 22   < >  --  21*   BUN 45.0* 45.0* 42.3*   < >  --  36.8*   CR 3.82* 3.93* 3.80*   < >  --  3.67*   PHOS 3.6  --   --   --  4.6* 4.9*   MAG 2.0 2.0 2.0   < >  --  2.0    < > = values in this interval not displayed.     Plan:  -Avoid nephrotoxins, renally dose meds  -Monitor urine output  -FBG and diuresis as above    == Infectious Disease ==  #Aspiration Pneumonia- in the setting of arrest.  CXR/CAP as above.   #Leukocytosis  Recent Labs   Lab 08/02/24 0348 08/01/24 2142 08/01/24  1607   WBC 10.9 10.5 9.3     No data recorded.    Cultures thus far:  Pending     Antibiotics  Anti-infectives (From now, onward)      None                       Plan:  -Continue CTX x 5D for aspiration coverage  -Monitor for signs of infection  -Avoid fevers     == Hematology ==  # Anemia of critical illness  Recent Labs   Lab 08/02/24  0348 08/01/24  2142 08/01/24  1607   HGB 11.2* 11.0* 11.2*   HCT 31.7* 31.9* 31.9*   PLT 72* 79* 83*     Recent Labs    Lab 08/02/24  0348 08/01/24  2142 08/01/24  1607   INR 1.56* 1.60* 1.67*   PTT 72* 69* 61*     Hgb stable. No e/o bleeding  Receiving heparin for ECMO with ACT goal 140-160  Dose (units/hr) HEParin: 900 Units/hr  ACT  (seconds): 190 seconds  US LE w/ arterial duplex pending  DVT PPX: Heparin as above    Plan:  -Continue Heparin with ACT goal as above  -Transfusion parameters:   -1u PRBC for hbg < 7.0   -1u platelet for plt < 50   -1u FFP for INR > 3   -5u cryoprecipitate for fibrinogen <150     == Endocrinology ==  #Hyperglycemia   Hgb a1c   Recent Labs   Lab 07/30/24  0923   A1C 5.5     Recent Labs   Lab 08/02/24  0357 08/02/24  0348 08/01/24  2343 08/01/24  2146   * 112*  112* 120* 123*        Plan  -Insulin infusion as needed    == Integumentary ==  -No skin issues  -CTM cannula sites    == Lines/Tubes/Drains ==  Peripheral IV 07/30/24 Anterior;Distal;Right Upper arm (Active)   Site Assessment [facility] 08/02/24 0400   Line Status Saline locked 08/02/24 0400   Dressing Transparent 08/02/24 0400   Dressing Status clean;dry;intact 08/02/24 0400   Line Intervention Flushed 08/01/24 1200   Phlebitis Scale 0-->no symptoms 08/02/24 0400   Infiltration? no 08/02/24 0400   Number of days: 3       Peripheral IV 07/30/24 Anterior;Distal;Left Upper arm (Active)   Site Assessment [facility] 08/02/24 0400   Line Status Saline locked 08/02/24 0400   Dressing Transparent 08/02/24 0400   Dressing Status clean;intact;dry 08/02/24 0400   Line Intervention Flushed 08/01/24 1200   Phlebitis Scale 0-->no symptoms 08/02/24 0400   Infiltration? no 08/02/24 0400   Number of days: 3       Arterial Line 07/30/24 Wrist (Active)   Site Assessment [facility] 08/02/24 0400   Line Status Pulsatile blood flow 08/02/24 0400   Arterine Line Cap Change Due 08/04/24 08/01/24 1200   Art Line Waveform Appropriate;Square wave test performed 08/02/24 0400   Art Line Interventions Leveled;Connections checked and tightened;Line pulled back 08/02/24 0400    Color/Movement/Sensation Capillary refill less than 3 sec 08/02/24 0400   Line Necessity Yes, meets criteria 08/02/24 0400   Dressing Type Transparent 08/02/24 0400   Dressing Status Clean, dry, intact 08/02/24 0400   Dressing Intervention Dressing changed/new dressing 07/30/24 1200   Dressing Change Due 08/06/24 08/02/24 0400   Number of days: 3       Arterial Sheath  07/30/24 Femoral (Active)   Specific Qualities Stat Locked;Sutured 08/02/24 0400   Site Assessment WDL 08/02/24 0400   Dressing Type Biopatch;Transparent 08/02/24 0400   Dressing Intervention Dressing changed/new dressing 07/30/24 1200   Arterial Sheath Comment IABP 08/02/24 0000   Number of days: 3       CVC Triple Lumen Left Femoral Non - valved (open ended) (Active)   Site Assessment WDL 08/02/24 0400   External Cath Length (cm) 0 cm 07/30/24 1600   Dressing Chlorhexidine disk;Transparent 08/02/24 0400   Dressing Status clean;intact;dry 08/02/24 0400   Dressing Intervention dressing reinforced 07/31/24 2300   Dressing Change Due 08/06/24 08/01/24 2000   Line Necessity Yes, meets criteria 08/02/24 0400   Blue - Status infusing 08/02/24 0400   Blue - Cap Change Due 08/03/24 08/01/24 2000   Blue - Intervention Flushed 07/30/24 1200   Brown - Status infusing 08/02/24 0400   Brown - Cap Change Due 08/03/24 08/01/24 2000   Brown - Intervention Flushed 07/30/24 1200   White - Status infusing 08/02/24 0400   White - Cap Change Due 08/03/24 08/01/24 2000   White - Intervention Flushed 07/30/24 1200   Phlebitis Scale 0-->no symptoms 08/02/24 0400   Infiltration? no 08/02/24 0400   CVC Comment 3L CVC 08/01/24 0400   Number of days:        ECMO Cannula Arterial Right femoral artery (Active)   Site Assessment WDL;Sutured;Secure 08/02/24 0400   Skin Assessment Clean;Dry;Intact 08/02/24 0400   Dressing Type Ioban;Silverlon 08/02/24 0400   Dressing Status clean;dry;intact 08/02/24 0400   Site Intervention No intervention needed 08/02/24 0400   Number of days:         ECMO Cannula Venous Right femoral vein (Active)   Site Assessment WDL;Sutured;Secure 08/02/24 0400   Skin Assessment Clean;Dry;Intact 08/02/24 0400   Dressing Type Silverlon;Ioban 08/02/24 0400   Dressing Status clean;dry;intact 08/02/24 0400   Site Intervention No intervention needed 08/02/24 0400   Number of days:        Distal Perfusion Catheter Right superficial femoral artery (Active)   Site Assessment WDL;Secure;Sutured 08/02/24 0400   Dressing Type Silverlon;Ioban 08/02/24 0400   Dressing Status clean;dry;intact 08/02/24 0400   Site Intervention No intervention needed 08/02/24 0400   Number of days:        ETT Cuffed 7 mm (Active)   Secured at (cm) 24 cm 08/02/24 0400   Measured from Lips 08/02/24 0400   Position Center 08/02/24 0600   Secured by Commercial tube novoa 08/02/24 0400   Bite Block None Present 08/02/24 0400   Site Appearance Clean;Dry 08/02/24 0400   Tube Care Site care done 08/02/24 0400   Cuff Assessment Minimal occluding volume 08/02/24 0400   Safety Measures Manual resuscitator at bedside 08/02/24 0400   Number of days: 3       NG/OG/NJ Tube Orogastric 16 fr Center mouth (Active)   Site Description WDL 08/02/24 0400   Status Clamped 08/02/24 0400   Drainage Appearance Brown 07/31/24 2000   Placement Confirmation [place] unchanged 08/02/24 0400   [place] (cm marking) at nare/mouth 78 cm 08/02/24 0400   Intake (ml) 30 ml 08/01/24 0500   Flush/Free Water (mL) 30 mL 08/01/24 0500   Container Amount 0 mL 08/02/24 0400   Output (ml) 0 ml 08/02/24 0400   Number of days: 3       Rectal Tube With balloon (Active)   Balloon fill volume  40 cc 08/02/24 0400   Stool Leakage None 08/02/24 0400   Rectal Tube Container Volume 225 ml 08/02/24 0400   Rectal Tube Output 125 ml 08/02/24 0400   Number of days: 3       Urethral Catheter 07/30/24 16 fr (Active)   Tube Description Positional 08/02/24 0400   Catheter Care Done;Catheter wipes 08/02/24 0400   Collection Container Standard 08/02/24 0400  "  Securement Method Securing device (Describe) 08/02/24 0400   Rationale for Continued Use ICU only: hourly urine output needed for patient care 08/02/24 0400   Urine Output 45 mL 08/02/24 0600   Number of days: 3     ICU Checklist  Feeding: hold  Analgesia: hold  Sedation: hold   Thrombopx: heparin infusion  Head of bed: reverse trend  Ulcers: PPI  Glucose: insulin infusion  SBT: no  Bowel regimen: ordered  Indwelling cath: yes, needed  De-escalate meds:   Code Status: No CPR- Do NOT Intubate , VA ECMO candidate   Family updated by team. Patient seen and discussed with staff physician, Dr. Ceferino Sanchez.    Сергей Wolf APRN, CNP  Critical Care Cardiology  Securely message with the Vocera Web Console (learn more here)    Objective:  Most recent vital signs:  Pulse 65   Temp 98.1  F (36.7  C)   Resp 10   Ht 1.575 m (5' 2\")   Wt 105.3 kg (232 lb 1.6 oz)   SpO2 100%   BMI 42.45 kg/m    Temp:  [97  F (36.1  C)-98.6  F (37  C)] 98.1  F (36.7  C)  Pulse:  [59-72] 65  Resp:  [10-18] 10  MAP:  [62 mmHg-89 mmHg] 76 mmHg  Arterial Line BP: ()/(45-72) 98/56  FiO2 (%):  [80 %-100 %] 100 %  SpO2:  [80 %-100 %] 100 %    Physical exam:  General: critically ill, supine in bed, in NAD  HEENT: Pupils fixed by pupillometer, no scleral icterus or injection  CARDIAC: RRR, no m/r/g appreciated. Peripheral pulses dopplered  RESP: synchronous with mechanical ventilation, CTAB, no wheezes, rhonchi or crackles appreciated.  GI: soft, non-distended, BS hypoactive  : Urinary catheter present  EXTREMITIES: NO LE edema, pulses 2+. Femoral access site w/o bleeding, dressing C/D/I.   SKIN: No acute lesions appreciated on exposed skin.  NEURO: Intubated and sedated. Unable to assess language or mentation. Unresponsive to noxious stim x 4 extremities. No cough. No gag. Not over breathing vent.     Labs:  Results for orders placed or performed during the hospital encounter of 07/30/24 (from the past 24 hour(s))   Ammonia "   Result Value Ref Range    Ammonia 30 16 - 60 umol/L   Blood gas arterial   Result Value Ref Range    pH Arterial 7.40 7.35 - 7.45    pCO2 Arterial 37 35 - 45 mm Hg    pO2 Arterial 58 (L) 80 - 105 mm Hg    FIO2 80     Bicarbonate Arterial 23 21 - 28 mmol/L    Base Excess/Deficit Arterial -1.4 -3.0 - 3.0 mmol/L    Ollie's Test Artline     Oxyhemoglobin Arterial 91 (L) 92 - 100 %    O2 Sat, Arterial 92.4 (L) 96.0 - 97.0 %    Narrative    In healthy individuals, oxyhemoglobin (O2Hb) and oxygen saturation (SO2) are approximately equal. In the presence of dyshemoglobins, oxyhemoglobin can be considerably lower than oxygen saturation.   Calcium ionized whole blood   Result Value Ref Range    Calcium Ionized Whole Blood 4.6 4.4 - 5.2 mg/dL   CBC with platelets   Result Value Ref Range    WBC Count 8.9 4.0 - 11.0 10e3/uL    RBC Count 3.60 (L) 4.40 - 5.90 10e6/uL    Hemoglobin 11.4 (L) 13.3 - 17.7 g/dL    Hematocrit 31.9 (L) 40.0 - 53.0 %    MCV 89 78 - 100 fL    MCH 31.7 26.5 - 33.0 pg    MCHC 35.7 31.5 - 36.5 g/dL    RDW 14.2 10.0 - 15.0 %    Platelet Count 95 (L) 150 - 450 10e3/uL   Comprehensive metabolic panel   Result Value Ref Range    Sodium 148 (H) 135 - 145 mmol/L    Potassium 4.3 3.4 - 5.3 mmol/L    Carbon Dioxide (CO2) 22 22 - 29 mmol/L    Anion Gap 12 7 - 15 mmol/L    Urea Nitrogen 39.7 (H) 8.0 - 23.0 mg/dL    Creatinine 3.72 (H) 0.67 - 1.17 mg/dL    GFR Estimate 18 (L) >60 mL/min/1.73m2    Calcium 8.2 (L) 8.8 - 10.4 mg/dL    Chloride 114 (H) 98 - 107 mmol/L    Glucose 130 (H) 70 - 99 mg/dL    Alkaline Phosphatase 55 40 - 150 U/L     (HH) 0 - 45 U/L     (H) 0 - 70 U/L    Protein Total 4.7 (L) 6.4 - 8.3 g/dL    Albumin 2.8 (L) 3.5 - 5.2 g/dL    Bilirubin Total 2.0 (H) <=1.2 mg/dL   Glucose whole blood   Result Value Ref Range    Glucose 123 (H) 70 - 99 mg/dL   Heparin Unfractionated Anti Xa Level   Result Value Ref Range    Anti Xa Unfractionated Heparin <0.10 For Reference Range, See Comment IU/mL     Narrative    Therapeutic Range: UFH: 0.25-0.50 IU/mL for low intensity dosing,  0.30-0.70 IU/mL for high intensity dosing DVT and PE.  This test is not validated for other direct factor X inhibitors (e.g. rivaroxaban, apixaban, edoxaban, betrixaban, fondaparinux) and should not be used for monitoring of other medications.   INR   Result Value Ref Range    INR 1.81 (H) 0.85 - 1.15   Lactic acid whole blood   Result Value Ref Range    Lactic Acid 2.5 (H) 0.7 - 2.0 mmol/L   Magnesium   Result Value Ref Range    Magnesium 2.0 1.7 - 2.3 mg/dL   Partial thromboplastin time   Result Value Ref Range    aPTT 81 (H) 22 - 38 Seconds   Troponin T, High Sensitivity   Result Value Ref Range    Troponin T, High Sensitivity >10,000 (HH) <=22 ng/L   CK total   Result Value Ref Range    CK 5,452 (HH) 39 - 308 U/L   Activated clotting time celite, POCT   Result Value Ref Range    Activated Clotting Time (Celite) POCT 182 (H) 74 - 150 seconds   Blood gas arterial   Result Value Ref Range    pH Arterial 7.39 7.35 - 7.45    pCO2 Arterial 38 35 - 45 mm Hg    pO2 Arterial 68 (L) 80 - 105 mm Hg    FIO2 80     Bicarbonate Arterial 23 21 - 28 mmol/L    Base Excess/Deficit Arterial -1.4 -3.0 - 3.0 mmol/L    Ollie's Test Artline     Oxyhemoglobin Arterial 92 92 - 100 %    O2 Sat, Arterial 94.7 (L) 96.0 - 97.0 %    Narrative    In healthy individuals, oxyhemoglobin (O2Hb) and oxygen saturation (SO2) are approximately equal. In the presence of dyshemoglobins, oxyhemoglobin can be considerably lower than oxygen saturation.   Glucose by meter   Result Value Ref Range    GLUCOSE BY METER POCT 119 (H) 70 - 99 mg/dL   NM Brain Scan Complete w Vascular Flow    Narrative    BRAIN PERFUSION: NM BRAIN SCAN COMPLETE W VASCULAR FLOW    DATE: 8/1/2024 3:15 PM    HISTORY: brain death    COMPARISON: Same day head CT    TECHNIQUE:   When possible a head band is placed to reduced the external carotid  circulation over the scalp. Dynamic images were obtained  after IV  administration of 24.8 mci of [id number] (Ceretect). Static images  were obtained after the dynamic imaging to look for intercranial  circulation.    FINDINGS:  The flow series demonstrate no intracranial flow.    The delayed static images demonstrate no intracranial tracer uptake.      Impression    IMPRESSION:  No evidence of intercranial perfusion compatible with brain death.    I have personally reviewed the examination and initial interpretation  and I agree with the findings.    BERRY OVALLE MD         SYSTEM ID:  [id number]   Blood gas arterial   Result Value Ref Range    pH Arterial 7.35 7.35 - 7.45    pCO2 Arterial 43 35 - 45 mm Hg    pO2 Arterial 71 (L) 80 - 105 mm Hg    FIO2 90     Bicarbonate Arterial 24 21 - 28 mmol/L    Base Excess/Deficit Arterial -1.9 -3.0 - 3.0 mmol/L    Ollie's Test Artline     Oxyhemoglobin Arterial 93 92 - 100 %    O2 Sat, Arterial 94.8 (L) 96.0 - 97.0 %    Narrative    In healthy individuals, oxyhemoglobin (O2Hb) and oxygen saturation (SO2) are approximately equal. In the presence of dyshemoglobins, oxyhemoglobin can be considerably lower than oxygen saturation.   Blood gas ELS arterial   Result Value Ref Range    pH ELS Arterial 7.40 7.35 - 7.45    pCO2 ELS Arterial 36 35 - 45 mm Hg    pO2 ELS Arterial 245 (H) 80 - 105 mm Hg    Bicarbonate ELS Arterial 22 21 - 28 mmol/L    Base Excess/Deficit Arterial -2.0 -3.0 - 3.0 mmol/L    FIO2 60     Oxyhemoglobin ELS Arterial 99 75 - 100 %    O2 Saturation ELS Arterial >100.0 (H) 96.0 - 97.0 %    Narrative    In healthy individuals, oxyhemoglobin (O2Hb) and oxygen saturation (SO2) are approximately equal. In the presence of dyshemoglobins, oxyhemoglobin can be considerably lower than oxygen saturation.   Blood gas ELS venous   Result Value Ref Range    pH ELS Venous 7.37 7.32 - 7.43    pCO2 ELS Venous 42 40 - 50 mm Hg    pO2 ELS Venous 43 25 - 47 mm Hg    Bicarbonate ELS Venous 24 21 - 28 mmol/L    Base Excess/Deficit Venous  -1.4 -3.0 - 3.0 mmol/L    FIO2 90     Oxyhemoglobin ELS Venous 78 %    O2 Saturation ELS Venous 79.3 (H) 70.0 - 75.0 %    Narrative    In healthy individuals, oxyhemoglobin (O2Hb) and oxygen saturation (SO2) are approximately equal. In the presence of dyshemoglobins, oxyhemoglobin can be considerably lower than oxygen saturation.   Calcium ionized whole blood   Result Value Ref Range    Calcium Ionized Whole Blood 4.6 4.4 - 5.2 mg/dL   CBC with platelets   Result Value Ref Range    WBC Count 9.3 4.0 - 11.0 10e3/uL    RBC Count 3.56 (L) 4.40 - 5.90 10e6/uL    Hemoglobin 11.2 (L) 13.3 - 17.7 g/dL    Hematocrit 31.9 (L) 40.0 - 53.0 %    MCV 90 78 - 100 fL    MCH 31.5 26.5 - 33.0 pg    MCHC 35.1 31.5 - 36.5 g/dL    RDW 14.4 10.0 - 15.0 %    Platelet Count 83 (L) 150 - 450 10e3/uL   Comprehensive metabolic panel   Result Value Ref Range    Sodium 148 (H) 135 - 145 mmol/L    Potassium 4.1 3.4 - 5.3 mmol/L    Carbon Dioxide (CO2) 22 22 - 29 mmol/L    Anion Gap 12 7 - 15 mmol/L    Urea Nitrogen 42.3 (H) 8.0 - 23.0 mg/dL    Creatinine 3.80 (H) 0.67 - 1.17 mg/dL    GFR Estimate 17 (L) >60 mL/min/1.73m2    Calcium 8.2 (L) 8.8 - 10.4 mg/dL    Chloride 114 (H) 98 - 107 mmol/L    Glucose 122 (H) 70 - 99 mg/dL    Alkaline Phosphatase 60 40 - 150 U/L     (HH) 0 - 45 U/L     (H) 0 - 70 U/L    Protein Total 4.9 (L) 6.4 - 8.3 g/dL    Albumin 2.9 (L) 3.5 - 5.2 g/dL    Bilirubin Total 2.1 (H) <=1.2 mg/dL   Glucose whole blood   Result Value Ref Range    Glucose 119 (H) 70 - 99 mg/dL   Heparin Unfractionated Anti Xa Level   Result Value Ref Range    Anti Xa Unfractionated Heparin <0.10 For Reference Range, See Comment IU/mL    Narrative    Therapeutic Range: UFH: 0.25-0.50 IU/mL for low intensity dosing,  0.30-0.70 IU/mL for high intensity dosing DVT and PE.  This test is not validated for other direct factor X inhibitors (e.g. rivaroxaban, apixaban, edoxaban, betrixaban, fondaparinux) and should not be used for monitoring  of other medications.   INR   Result Value Ref Range    INR 1.67 (H) 0.85 - 1.15   D dimer quantitative   Result Value Ref Range    D-Dimer Quantitative 3.31 (H) 0.00 - 0.50 ug/mL FEU    Narrative    This D-dimer assay is intended for use in conjunction with a clinical pretest probability assessment model to exclude pulmonary embolism (PE) and deep venous thrombosis (DVT) in outpatients suspected of PE or DVT. The cut-off value is 0.50 ug/mL FEU.    For patients 50 years of age or older, the application of age-adjusted cut-off values for D-Dimer may increase the specificity without significant effect on sensitivity. The literature suggested calculation age adjusted cut-off in ug/L = age in years x 10 ug/L. The results in this laboratory are reported as ug/mL rather than ug/L. The calculation for age adjusted cut off in ug/mL= age in years x 0.01 ug/mL. For example, the cut off for a 76 year old male is 75 x 0.01 ug/mL = 0.76 ug/mL (760 ug/L).    M Bella et al. Age adjusted D-dimer cut-off levels to rule out pulmonary embolism: The ADJUST-PE Study. GUDELIA 2014;311:3273-3685.; HJ Anastacio et al. Diagnostic accuracy of conventional or age adjusted D-dimer cutoff values in older patients with suspected venous thromboembolism. Systemic review and meta-analysis. BMJ 2013:346:f2492.   Fibrinogen activity   Result Value Ref Range    Fibrinogen Activity 551 (H) 170 - 510 mg/dL   Lactic acid whole blood   Result Value Ref Range    Lactic Acid 2.4 (H) 0.7 - 2.0 mmol/L   Magnesium   Result Value Ref Range    Magnesium 2.0 1.7 - 2.3 mg/dL   Partial thromboplastin time   Result Value Ref Range    aPTT 61 (H) 22 - 38 Seconds   Troponin T, High Sensitivity   Result Value Ref Range    Troponin T, High Sensitivity >10,000 (HH) <=22 ng/L   CK total   Result Value Ref Range    CK 5,323 (HH) 39 - 308 U/L   Glucose by meter   Result Value Ref Range    GLUCOSE BY METER POCT 123 (H) 70 - 99 mg/dL   Activated clotting time celite, POCT    Result Value Ref Range    Activated Clotting Time (Celite) POCT 174 (H) 74 - 150 seconds   Activated clotting time celite, POCT   Result Value Ref Range    Activated Clotting Time (Celite) POCT 190 (H) 74 - 150 seconds   Calcium ionized whole blood   Result Value Ref Range    Calcium Ionized Whole Blood 4.4 4.4 - 5.2 mg/dL   CBC with platelets   Result Value Ref Range    WBC Count 10.5 4.0 - 11.0 10e3/uL    RBC Count 3.54 (L) 4.40 - 5.90 10e6/uL    Hemoglobin 11.0 (L) 13.3 - 17.7 g/dL    Hematocrit 31.9 (L) 40.0 - 53.0 %    MCV 90 78 - 100 fL    MCH 31.1 26.5 - 33.0 pg    MCHC 34.5 31.5 - 36.5 g/dL    RDW 14.6 10.0 - 15.0 %    Platelet Count 79 (L) 150 - 450 10e3/uL   Comprehensive metabolic panel   Result Value Ref Range    Sodium 148 (H) 135 - 145 mmol/L    Potassium 4.4 3.4 - 5.3 mmol/L    Carbon Dioxide (CO2) 22 22 - 29 mmol/L    Anion Gap 12 7 - 15 mmol/L    Urea Nitrogen 45.0 (H) 8.0 - 23.0 mg/dL    Creatinine 3.93 (H) 0.67 - 1.17 mg/dL    GFR Estimate 17 (L) >60 mL/min/1.73m2    Calcium 8.2 (L) 8.8 - 10.4 mg/dL    Chloride 114 (H) 98 - 107 mmol/L    Glucose 125 (H) 70 - 99 mg/dL    Alkaline Phosphatase 94 40 - 150 U/L     (HH) 0 - 45 U/L     (H) 0 - 70 U/L    Protein Total 5.0 (L) 6.4 - 8.3 g/dL    Albumin 2.8 (L) 3.5 - 5.2 g/dL    Bilirubin Total 2.2 (H) <=1.2 mg/dL   Glucose whole blood   Result Value Ref Range    Glucose 120 (H) 70 - 99 mg/dL   Heparin Unfractionated Anti Xa Level   Result Value Ref Range    Anti Xa Unfractionated Heparin <0.10 For Reference Range, See Comment IU/mL    Narrative    Therapeutic Range: UFH: 0.25-0.50 IU/mL for low intensity dosing,  0.30-0.70 IU/mL for high intensity dosing DVT and PE.  This test is not validated for other direct factor X inhibitors (e.g. rivaroxaban, apixaban, edoxaban, betrixaban, fondaparinux) and should not be used for monitoring of other medications.   INR   Result Value Ref Range    INR 1.60 (H) 0.85 - 1.15   Lactic acid whole blood    Result Value Ref Range    Lactic Acid 2.1 (H) 0.7 - 2.0 mmol/L   Magnesium   Result Value Ref Range    Magnesium 2.0 1.7 - 2.3 mg/dL   Partial thromboplastin time   Result Value Ref Range    aPTT 69 (H) 22 - 38 Seconds   Troponin T, High Sensitivity   Result Value Ref Range    Troponin T, High Sensitivity >10,000 (HH) <=22 ng/L   CK total   Result Value Ref Range    CK 5,333 (HH) 39 - 308 U/L   Blood gas arterial   Result Value Ref Range    pH Arterial 7.30 (L) 7.35 - 7.45    pCO2 Arterial 50 (H) 35 - 45 mm Hg    pO2 Arterial 58 (L) 80 - 105 mm Hg    FIO2 100     Bicarbonate Arterial 25 21 - 28 mmol/L    Base Excess/Deficit Arterial -2.2 -3.0 - 3.0 mmol/L    Ollie's Test Artline     Oxyhemoglobin Arterial 88 (L) 92 - 100 %    O2 Sat, Arterial 89.5 (L) 96.0 - 97.0 %    Peep 12 cm H2O    Narrative    In healthy individuals, oxyhemoglobin (O2Hb) and oxygen saturation (SO2) are approximately equal. In the presence of dyshemoglobins, oxyhemoglobin can be considerably lower than oxygen saturation.   Glucose by meter   Result Value Ref Range    GLUCOSE BY METER POCT 123 (H) 70 - 99 mg/dL   ABO/Rh type and screen    Narrative    The following orders were created for panel order ABO/Rh type and screen.  Procedure                               Abnormality         Status                     ---------                               -----------         ------                     Adult Type and Screen[[id number]]                            Final result                 Please view results for these tests on the individual orders.   Adult Type and Screen   Result Value Ref Range    ABO/RH(D) A POS     Antibody Screen Negative Negative    SPECIMEN EXPIRATION DATE [id number]    Blood gas arterial   Result Value Ref Range    pH Arterial 7.36 7.35 - 7.45    pCO2 Arterial 43 35 - 45 mm Hg    pO2 Arterial 62 (L) 80 - 105 mm Hg    FIO2 100     Bicarbonate Arterial 24 21 - 28 mmol/L    Base Excess/Deficit Arterial -1.3 -3.0 - 3.0  mmol/L    Ollie's Test Artline     Oxyhemoglobin Arterial 91 (L) 92 - 100 %    O2 Sat, Arterial 93.1 (L) 96.0 - 97.0 %    Peep 12 cm H2O    Narrative    In healthy individuals, oxyhemoglobin (O2Hb) and oxygen saturation (SO2) are approximately equal. In the presence of dyshemoglobins, oxyhemoglobin can be considerably lower than oxygen saturation.   Glucose by meter   Result Value Ref Range    GLUCOSE BY METER POCT 120 (H) 70 - 99 mg/dL   Activated clotting time celite, POCT   Result Value Ref Range    Activated Clotting Time (Celite) POCT 195 (H) 74 - 150 seconds   Blood gas ELS arterial   Result Value Ref Range    pH ELS Arterial 7.43 7.35 - 7.45    pCO2 ELS Arterial 34 (L) 35 - 45 mm Hg    pO2 ELS Arterial 467 (H) 80 - 105 mm Hg    Bicarbonate ELS Arterial 23 21 - 28 mmol/L    Base Excess/Deficit Arterial -1.4 -3.0 - 3.0 mmol/L    FIO2 100     Oxyhemoglobin ELS Arterial 99 75 - 100 %    O2 Saturation ELS Arterial >100.0 (H) 96.0 - 97.0 %    Narrative    In healthy individuals, oxyhemoglobin (O2Hb) and oxygen saturation (SO2) are approximately equal. In the presence of dyshemoglobins, oxyhemoglobin can be considerably lower than oxygen saturation.   Blood gas ELS venous   Result Value Ref Range    pH ELS Venous 7.38 7.32 - 7.43    pCO2 ELS Venous 41 40 - 50 mm Hg    pO2 ELS Venous 43 25 - 47 mm Hg    Bicarbonate ELS Venous 24 21 - 28 mmol/L    Base Excess/Deficit Venous -1.0 -3.0 - 3.0 mmol/L    FIO2 100     Oxyhemoglobin ELS Venous 80 %    O2 Saturation ELS Venous 82.0 (H) 70.0 - 75.0 %    Narrative    In healthy individuals, oxyhemoglobin (O2Hb) and oxygen saturation (SO2) are approximately equal. In the presence of dyshemoglobins, oxyhemoglobin can be considerably lower than oxygen saturation.   Calcium ionized whole blood   Result Value Ref Range    Calcium Ionized Whole Blood 4.8 4.4 - 5.2 mg/dL   CBC with platelets   Result Value Ref Range    WBC Count 10.9 4.0 - 11.0 10e3/uL    RBC Count 3.55 (L) 4.40 -  5.90 10e6/uL    Hemoglobin 11.2 (L) 13.3 - 17.7 g/dL    Hematocrit 31.7 (L) 40.0 - 53.0 %    MCV 89 78 - 100 fL    MCH 31.5 26.5 - 33.0 pg    MCHC 35.3 31.5 - 36.5 g/dL    RDW 14.6 10.0 - 15.0 %    Platelet Count 72 (L) 150 - 450 10e3/uL   Comprehensive metabolic panel   Result Value Ref Range    Sodium 147 (H) 135 - 145 mmol/L    Potassium 4.1 3.4 - 5.3 mmol/L    Carbon Dioxide (CO2) 22 22 - 29 mmol/L    Anion Gap 11 7 - 15 mmol/L    Urea Nitrogen 45.0 (H) 8.0 - 23.0 mg/dL    Creatinine 3.82 (H) 0.67 - 1.17 mg/dL    GFR Estimate 17 (L) >60 mL/min/1.73m2    Calcium 8.7 (L) 8.8 - 10.4 mg/dL    Chloride 114 (H) 98 - 107 mmol/L    Glucose 112 (H) 70 - 99 mg/dL    Alkaline Phosphatase 90 40 - 150 U/L     (HH) 0 - 45 U/L     (H) 0 - 70 U/L    Protein Total 4.9 (L) 6.4 - 8.3 g/dL    Albumin 2.9 (L) 3.5 - 5.2 g/dL    Bilirubin Total 2.5 (H) <=1.2 mg/dL   Glucose whole blood   Result Value Ref Range    Glucose 112 (H) 70 - 99 mg/dL   Heparin Unfractionated Anti Xa Level   Result Value Ref Range    Anti Xa Unfractionated Heparin <0.10 For Reference Range, See Comment IU/mL    Narrative    Therapeutic Range: UFH: 0.25-0.50 IU/mL for low intensity dosing,  0.30-0.70 IU/mL for high intensity dosing DVT and PE.  This test is not validated for other direct factor X inhibitors (e.g. rivaroxaban, apixaban, edoxaban, betrixaban, fondaparinux) and should not be used for monitoring of other medications.   INR   Result Value Ref Range    INR 1.56 (H) 0.85 - 1.15   D dimer quantitative   Result Value Ref Range    D-Dimer Quantitative 3.98 (H) 0.00 - 0.50 ug/mL FEU    Narrative    This D-dimer assay is intended for use in conjunction with a clinical pretest probability assessment model to exclude pulmonary embolism (PE) and deep venous thrombosis (DVT) in outpatients suspected of PE or DVT. The cut-off value is 0.50 ug/mL FEU.    For patients 50 years of age or older, the application of age-adjusted cut-off values for  D-Dimer may increase the specificity without significant effect on sensitivity. The literature suggested calculation age adjusted cut-off in ug/L = age in years x 10 ug/L. The results in this laboratory are reported as ug/mL rather than ug/L. The calculation for age adjusted cut off in ug/mL= age in years x 0.01 ug/mL. For example, the cut off for a 76 year old male is 75 x 0.01 ug/mL = 0.76 ug/mL (760 ug/L).    M Bella et al. Age adjusted D-dimer cut-off levels to rule out pulmonary embolism: The ADJUST-PE Study. GUDELIA 2014;311:9653-7586.; HJ Anastacio et al. Diagnostic accuracy of conventional or age adjusted D-dimer cutoff values in older patients with suspected venous thromboembolism. Systemic review and meta-analysis. BMJ 2013:346:f2492.   Fibrinogen activity   Result Value Ref Range    Fibrinogen Activity 662 (H) 170 - 510 mg/dL   Lactic acid whole blood   Result Value Ref Range    Lactic Acid 2.2 (H) 0.7 - 2.0 mmol/L   Magnesium   Result Value Ref Range    Magnesium 2.0 1.7 - 2.3 mg/dL   Partial thromboplastin time   Result Value Ref Range    aPTT 72 (H) 22 - 38 Seconds   Troponin T, High Sensitivity   Result Value Ref Range    Troponin T, High Sensitivity >10,000 (HH) <=22 ng/L   CK total   Result Value Ref Range    CK 5,176 (HH) 39 - 308 U/L   Phosphorus   Result Value Ref Range    Phosphorus 3.6 2.5 - 4.5 mg/dL   Blood gas arterial   Result Value Ref Range    pH Arterial 7.37 7.35 - 7.45    pCO2 Arterial 41 35 - 45 mm Hg    pO2 Arterial 68 (L) 80 - 105 mm Hg    FIO2 100     Bicarbonate Arterial 24 21 - 28 mmol/L    Base Excess/Deficit Arterial -1.3 -3.0 - 3.0 mmol/L    Ollie's Test Artline     Oxyhemoglobin Arterial 92 92 - 100 %    O2 Sat, Arterial 94.7 (L) 96.0 - 97.0 %    Peep 12 cm H2O    Narrative    In healthy individuals, oxyhemoglobin (O2Hb) and oxygen saturation (SO2) are approximately equal. In the presence of dyshemoglobins, oxyhemoglobin can be considerably lower than oxygen saturation.   CRP  inflammation   Result Value Ref Range    CRP Inflammation 327.00 (H) <5.00 mg/L   Erythrocyte sedimentation rate auto   Result Value Ref Range    Erythrocyte Sedimentation Rate 65 (H) 0 - 20 mm/hr   Hemoglobin plasma   Result Value Ref Range    Hemoglobin Plasma 30 (H) <30 mg/dL   Lactate Dehydrogenase   Result Value Ref Range    Lactate Dehydrogenase 2,261 (H) 0 - 250 U/L   TSH   Result Value Ref Range    TSH 12.60 (H) 0.30 - 4.20 uIU/mL   T3 Free   Result Value Ref Range    T3 Free 2.0 2.0 - 4.4 pg/mL   T3 total   Result Value Ref Range    T3 Total 75 (L) 85 - 202 ng/dL   T4 free   Result Value Ref Range    Free T4 1.47 0.90 - 1.70 ng/dL   Activated clotting time celite, POCT   Result Value Ref Range    Activated Clotting Time (Celite) POCT 190 (H) 74 - 150 seconds   Glucose by meter   Result Value Ref Range    GLUCOSE BY METER POCT 112 (H) 70 - 99 mg/dL

## 2024-08-02 NOTE — PROGRESS NOTES
[facility]     ECLS Shift Summary:     ECMO Equipment:  Console Serial Number: [id number]  Circuit Lot Number: [id number]  Oxygenator Lot Number: [id number]  Circuit Assessment: Fibrin  Fibrin Location: unchanged.    Cannulation Site:  Arterial ECMO Cannula: 17 Fr in the Right Femoral Artery  Venous ECMO Cannula: 25 Fr in the Right Femoral Vein  Distal Perfusion Cannula: 8 Fr in the Right Superficial Femoral Artery     Interval Events: Plan for w/d of care this am. If patient were to clot circuit we would NOT replace.     Assessment:  Subjective: Remains on V-A ECMO, all equipment is functioning and alarms are appropriately set. No acute distress.   Neuro:  Brain Death   CV:  V-A ECMO . Hemodynamically stable.  Pulm: Mechanical ventilation, equal chest rise and fall.   Cannulae sites:  No oozing around the ECMO cannulas.  ECMO Cannula Arterial Right femoral artery-Site Assessment: WDL, Sutured, Secure (Simultaneous filing. User may not have seen previous data.)  ECMO Cannula Venous Right femoral vein-Site Assessment: WDL, Sutured, Secure (Simultaneous filing. User may not have seen previous data.)  Distal Perfusion Catheter Right superficial femoral artery-Site Assessment: WDL, Secure, Sutured (Simultaneous filing. User may not have seen previous data.).     Blood Flow (Circuit) LPM: 3.79 LPM  Sweep LPM: 4 LPM  Sweep FiO2   %: 100 %  ACT  (seconds): 190 seconds  Pulse Oximetry  (SpO2%): 97 %  Arterial Pressure  mmH mmHg    Vent Mode: PCV Plus assist  FiO2 (%): 100 %  Resp Rate (Set): 10 breaths/min  Tidal Volume (Set, mL): 350 mL  PEEP (cm H2O): 12 cmH2O  Inspiratory Pressure (cm H2O) (Drager Cande): 20  Resp: 10    Temp:  [97  F (36.1  C)-98.6  F (37  C)] 98.1  F (36.7  C)  Pulse:  [59-72] 66  Resp:  [10-18] 10  MAP:  [61 mmHg-291 mmHg] 70 mmHg  Arterial Line BP: ()/(41-72) 90/51  FiO2 (%):  [80 %-100 %] 100 %  SpO2:  [80 %-100 %] 96 %      Intake/Output  Summary (Last 24 hours) at 8/2/2024 0432  Last data filed at 8/2/2024 0400  Gross per 24 hour   Intake 1563.87 ml   Output 1915 ml   Net -351.13 ml         Anticoagulation:  Dose (units/hr) HEParin: 900 Units/hr  Rate (mL/hr) HEParin: 9 mL/hr  Concentration HEParin: 100 Units/mL     Most recent: ACT  (seconds): 190 seconds  Goal -200    Labs:  Recent Labs   Lab 08/02/24  0348 08/01/24  2339 08/01/24  2143 08/01/24  1607   PH 7.37 7.36 7.30* 7.35   PCO2 41 43 50* 43   PO2 68* 62* 58* 71*   HCO3 24 24 25 24   O2PER 100  100  100 100 100 90  60  90       Lab Results   Component Value Date    HGB 11.0 (L) 08/01/2024    PHGB 30 (H) 08/01/2024    PLT 79 (L) 08/01/2024    FIBR 551 (H) 08/01/2024    INR 1.60 (H) 08/01/2024    PTT 69 (H) 08/01/2024    DD 3.31 (H) 08/01/2024    ANTCH 40 (L) 08/01/2024         SHELBI Johns  ECMO Specialist

## 2024-08-02 NOTE — PROGRESS NOTES
[facility]    ECLS Discontinuation Note:     ECLS was discontinued 8/2/2024 at 1000.    Power Carney RN  ECMO Specialist  8/2/2024 10:36 AM

## 2024-08-02 NOTE — PHARMACY-ADMISSION MEDICATION HISTORY
Admission Medication History     Patient will be transitioning to comfort cares.  No medication history will be completed

## 2024-08-02 NOTE — PLAN OF CARE
ICU End of Shift Summary 1900 - 0700  Labs drawn per orders and reviewed with results. MD informed of all critical labs and patient conditions as indicated throughout shift. See flowsheets for vital signs and detailed assessment. Titration of vasoactive medications occurred with MAP on IABP.      Shift Events:   No significant events occurred overnight. Remains unresponsive, NPi 0, without sedation/analgesia infusing. Remains SR on monitor, HR 60-70s. On ECMO, sweep increased to 4 with unchanged FiO2.  Flowing ~3.8, 100% FiO2. IABP without alarms, 1:1 100% augmentation. Skin/extremities well perfused. Afebrile. Vent settings unchanged. Sat originally in the mid-high 80s, improved throughout shifter after persistent suctioning for tan/keeley thick sputum. Airborne precautions maintained. Rectal tube intact draining loose/liquid stool. Lee with adequate output, although Creatinine level increasing.     Plan:   Family made patient DNR/DNI on previous shift given Nuc Med scan results. Additional family to be arriving from out of time. Education and emotional support provided to family regarding expectations and possible situations regarding removal of support. Family originally were talking about wanting to maintain support until Sunday. Discussed with realistic expectation and how patient may not survive until Sunday. Additional family arrived, son reports he needs time to visit the [employer] store to  additional supplies, before withdrawal teams. He reports plan will be here around 10am and withdrawal after. Team updated.     Goal Outcome Evaluation:      Plan of Care Reviewed With: family    Overall Patient Progress: no changeOverall Patient Progress: no change    Outcome Evaluation: Discussed with family plans to withdrawal care. Family to be brining in items from home around 10am with plans to withdrawal afterwards. Emotionial support provided to family. Patient unresponsive, nuc med scan confirmed brain death.  Remains on ECMO without alarms. Slight adjustments in pressors.

## 2024-08-04 LAB — BACTERIA BLD CULT: NO GROWTH

## 2024-08-06 LAB
S100 CA BINDING PROTEIN B SER-MCNC: 1455 NG/L
S100 CA BINDING PROTEIN B SER-MCNC: 2252 NG/L

## 2024-08-13 LAB
AMPHET BLD CFM-MCNC: NEGATIVE NG/ML
APAP BLD-MCNC: NEGATIVE UG/ML
BARBITURATES SPEC-MCNC: NEGATIVE UG/ML
BENZODIAZ SPEC-MCNC: NEGATIVE NG/ML
BUPRENORPHINE SERPL-MCNC: NEGATIVE NG/ML
BZE BLD CFM-MCNC: NEGATIVE NG/ML
CARBOXYTHC BLD-MCNC: NEGATIVE NG/ML
CARISOPRODOL IA: NEGATIVE UG/ML
DECLARED MEDICATIONS: NORMAL
DRUGS BLD SCN NOM: NORMAL
DRUGS FLD: NORMAL
DRUGS FLD: NORMAL
DRUGS FLD: POSITIVE
ETHANOL BLD-MCNC: NEGATIVE GM/DL
FENTANYL IA: NEGATIVE NG/ML
GABAPENTIN IA: NEGATIVE UG/ML
MEPERIDINE SERPLBLD-MCNC: NEGATIVE NG/ML
METHADONE SAL CFM-MCNC: NEGATIVE NG/ML
OPIATES SPEC-MCNC: NEGATIVE NG/ML
OXYCODONE SERPLBLD SCN-MCNC: NEGATIVE NG/ML
PCP SPEC-MCNC: NEGATIVE NG/ML
PROPOXYPH SPEC-MCNC: NEGATIVE NG/ML
S100 CA BINDING PROTEIN B SER-MCNC: 2809 NG/L
TRAMADOL BLD-MCNC: NEGATIVE NG/ML

## (undated) DEVICE — INTRO SHEATH 9FRX10CM PINNACLE RSS902

## (undated) DEVICE — KIT MICROINTRODUCER VASCULAR  4FRX21GAX4CM

## (undated) DEVICE — INTRO SHEATH 6FRX25CM PINNACLE RSS606

## (undated) DEVICE — KIT ARTERIAL LINE 2GA 12CM INTRO NDL ASK-04510-HF

## (undated) DEVICE — KIT HAND CONTROL ACIST 014644 AR-P54

## (undated) DEVICE — CATH ANGIO SUPERTORQUE PLUS JL4 6FRX100CM 533620

## (undated) DEVICE — PACK HEART LEFT CUSTOM

## (undated) DEVICE — CATH ANGIO INFINITI 3DRC 6FRX100CM 534676T

## (undated) DEVICE — TUBING PRESSURE 30"

## (undated) DEVICE — MANIFOLD KIT ANGIO AUTOMATED 014613

## (undated) DEVICE — WIRE GUIDE 0.035"X145CM AMPLATZ XSTIFF J THSCF-35-145-3-AES

## (undated) RX ORDER — MIDAZOLAM HCL IN 0.9 % NACL/PF 1 MG/ML
PLASTIC BAG, INJECTION (ML) INTRAVENOUS
Status: DISPENSED
Start: 2024-01-01

## (undated) RX ORDER — POTASSIUM CHLORIDE 29.8 MG/ML
INJECTION INTRAVENOUS
Status: DISPENSED
Start: 2024-01-01

## (undated) RX ORDER — FENTANYL CITRATE 50 UG/ML
INJECTION, SOLUTION INTRAMUSCULAR; INTRAVENOUS
Status: DISPENSED
Start: 2024-01-01